# Patient Record
Sex: MALE | Race: WHITE | NOT HISPANIC OR LATINO | Employment: OTHER | URBAN - METROPOLITAN AREA
[De-identification: names, ages, dates, MRNs, and addresses within clinical notes are randomized per-mention and may not be internally consistent; named-entity substitution may affect disease eponyms.]

---

## 2017-10-24 DIAGNOSIS — Z01.812 PRE-OPERATIVE LABORATORY EXAMINATION: ICD-10-CM

## 2017-10-24 DIAGNOSIS — Z01.810 PRE-OPERATIVE CARDIOVASCULAR EXAMINATION: ICD-10-CM

## 2017-10-24 LAB
ALBUMIN SERPL BCP-MCNC: 4.2 G/DL (ref 3.2–4.9)
ALBUMIN/GLOB SERPL: 1.6 G/DL
ALP SERPL-CCNC: 106 U/L (ref 30–99)
ALT SERPL-CCNC: 21 U/L (ref 2–50)
ANION GAP SERPL CALC-SCNC: 4 MMOL/L (ref 0–11.9)
AST SERPL-CCNC: 17 U/L (ref 12–45)
BASOPHILS # BLD AUTO: 1.3 % (ref 0–1.8)
BASOPHILS # BLD: 0.07 K/UL (ref 0–0.12)
BILIRUB SERPL-MCNC: 0.8 MG/DL (ref 0.1–1.5)
BUN SERPL-MCNC: 20 MG/DL (ref 8–22)
CALCIUM SERPL-MCNC: 9.6 MG/DL (ref 8.5–10.5)
CHLORIDE SERPL-SCNC: 106 MMOL/L (ref 96–112)
CO2 SERPL-SCNC: 29 MMOL/L (ref 20–33)
CREAT SERPL-MCNC: 0.8 MG/DL (ref 0.5–1.4)
EKG IMPRESSION: NORMAL
EOSINOPHIL # BLD AUTO: 0.12 K/UL (ref 0–0.51)
EOSINOPHIL NFR BLD: 2.3 % (ref 0–6.9)
ERYTHROCYTE [DISTWIDTH] IN BLOOD BY AUTOMATED COUNT: 44.4 FL (ref 35.9–50)
GFR SERPL CREATININE-BSD FRML MDRD: >60 ML/MIN/1.73 M 2
GLOBULIN SER CALC-MCNC: 2.7 G/DL (ref 1.9–3.5)
GLUCOSE SERPL-MCNC: 86 MG/DL (ref 65–99)
HCT VFR BLD AUTO: 43.4 % (ref 42–52)
HGB BLD-MCNC: 14.7 G/DL (ref 14–18)
IMM GRANULOCYTES # BLD AUTO: 0.03 K/UL (ref 0–0.11)
IMM GRANULOCYTES NFR BLD AUTO: 0.6 % (ref 0–0.9)
INR PPP: 1 (ref 0.87–1.13)
LYMPHOCYTES # BLD AUTO: 1.24 K/UL (ref 1–4.8)
LYMPHOCYTES NFR BLD: 23.3 % (ref 22–41)
MCH RBC QN AUTO: 33.6 PG (ref 27–33)
MCHC RBC AUTO-ENTMCNC: 33.9 G/DL (ref 33.7–35.3)
MCV RBC AUTO: 99.3 FL (ref 81.4–97.8)
MONOCYTES # BLD AUTO: 0.41 K/UL (ref 0–0.85)
MONOCYTES NFR BLD AUTO: 7.7 % (ref 0–13.4)
NEUTROPHILS # BLD AUTO: 3.46 K/UL (ref 1.82–7.42)
NEUTROPHILS NFR BLD: 64.8 % (ref 44–72)
NRBC # BLD AUTO: 0 K/UL
NRBC BLD AUTO-RTO: 0 /100 WBC
PLATELET # BLD AUTO: 255 K/UL (ref 164–446)
PMV BLD AUTO: 9.7 FL (ref 9–12.9)
POTASSIUM SERPL-SCNC: 4.2 MMOL/L (ref 3.6–5.5)
PROT SERPL-MCNC: 6.9 G/DL (ref 6–8.2)
PROTHROMBIN TIME: 13.5 SEC (ref 12–14.6)
RBC # BLD AUTO: 4.37 M/UL (ref 4.7–6.1)
SODIUM SERPL-SCNC: 139 MMOL/L (ref 135–145)
WBC # BLD AUTO: 5.3 K/UL (ref 4.8–10.8)

## 2017-10-24 PROCEDURE — 85610 PROTHROMBIN TIME: CPT

## 2017-10-24 PROCEDURE — 93005 ELECTROCARDIOGRAM TRACING: CPT

## 2017-10-24 PROCEDURE — 80053 COMPREHEN METABOLIC PANEL: CPT

## 2017-10-24 PROCEDURE — 85025 COMPLETE CBC W/AUTO DIFF WBC: CPT

## 2017-10-24 PROCEDURE — 36415 COLL VENOUS BLD VENIPUNCTURE: CPT

## 2017-10-24 PROCEDURE — 93010 ELECTROCARDIOGRAM REPORT: CPT | Performed by: INTERNAL MEDICINE

## 2017-10-27 ENCOUNTER — HOSPITAL ENCOUNTER (OUTPATIENT)
Facility: MEDICAL CENTER | Age: 67
End: 2017-10-27
Attending: SURGERY | Admitting: SURGERY
Payer: MEDICARE

## 2017-10-27 ENCOUNTER — APPOINTMENT (OUTPATIENT)
Dept: RADIOLOGY | Facility: MEDICAL CENTER | Age: 67
End: 2017-10-27
Attending: SURGERY
Payer: MEDICARE

## 2017-10-27 VITALS
HEIGHT: 68 IN | BODY MASS INDEX: 26.96 KG/M2 | WEIGHT: 177.91 LBS | RESPIRATION RATE: 16 BRPM | SYSTOLIC BLOOD PRESSURE: 151 MMHG | TEMPERATURE: 96.6 F | OXYGEN SATURATION: 98 % | DIASTOLIC BLOOD PRESSURE: 100 MMHG | HEART RATE: 86 BPM

## 2017-10-27 PROBLEM — C43.59 MALIGNANT MELANOMA OF SKIN OF TRUNK, EXCEPT SCROTUM (HCC): Status: ACTIVE | Noted: 2017-10-27

## 2017-10-27 PROCEDURE — 160009 HCHG ANES TIME/MIN: Performed by: SURGERY

## 2017-10-27 PROCEDURE — 160036 HCHG PACU - EA ADDL 30 MINS PHASE I: Performed by: SURGERY

## 2017-10-27 PROCEDURE — 160025 RECOVERY II MINUTES (STATS): Performed by: SURGERY

## 2017-10-27 PROCEDURE — 160029 HCHG SURGERY MINUTES - 1ST 30 MINS LEVEL 4: Performed by: SURGERY

## 2017-10-27 PROCEDURE — 501497 HCHG SURGICLIP: Performed by: SURGERY

## 2017-10-27 PROCEDURE — 501838 HCHG SUTURE GENERAL: Performed by: SURGERY

## 2017-10-27 PROCEDURE — 160041 HCHG SURGERY MINUTES - EA ADDL 1 MIN LEVEL 4: Performed by: SURGERY

## 2017-10-27 PROCEDURE — 700111 HCHG RX REV CODE 636 W/ 250 OVERRIDE (IP): Performed by: SURGERY

## 2017-10-27 PROCEDURE — 700111 HCHG RX REV CODE 636 W/ 250 OVERRIDE (IP)

## 2017-10-27 PROCEDURE — 700101 HCHG RX REV CODE 250

## 2017-10-27 PROCEDURE — 88305 TISSUE EXAM BY PATHOLOGIST: CPT

## 2017-10-27 PROCEDURE — 160048 HCHG OR STATISTICAL LEVEL 1-5: Performed by: SURGERY

## 2017-10-27 PROCEDURE — 160035 HCHG PACU - 1ST 60 MINS PHASE I: Performed by: SURGERY

## 2017-10-27 PROCEDURE — 88342 IMHCHEM/IMCYTCHM 1ST ANTB: CPT

## 2017-10-27 PROCEDURE — 160047 HCHG PACU  - EA ADDL 30 MINS PHASE II: Performed by: SURGERY

## 2017-10-27 PROCEDURE — 160002 HCHG RECOVERY MINUTES (STAT): Performed by: SURGERY

## 2017-10-27 PROCEDURE — 160046 HCHG PACU - 1ST 60 MINS PHASE II: Performed by: SURGERY

## 2017-10-27 PROCEDURE — A6402 STERILE GAUZE <= 16 SQ IN: HCPCS | Performed by: SURGERY

## 2017-10-27 RX ORDER — ONDANSETRON 2 MG/ML
4 INJECTION INTRAMUSCULAR; INTRAVENOUS EVERY 4 HOURS PRN
Status: DISCONTINUED | OUTPATIENT
Start: 2017-10-27 | End: 2017-10-27 | Stop reason: HOSPADM

## 2017-10-27 RX ORDER — SODIUM CHLORIDE, SODIUM LACTATE, POTASSIUM CHLORIDE, CALCIUM CHLORIDE 600; 310; 30; 20 MG/100ML; MG/100ML; MG/100ML; MG/100ML
INJECTION, SOLUTION INTRAVENOUS CONTINUOUS
Status: DISCONTINUED | OUTPATIENT
Start: 2017-10-27 | End: 2017-10-27 | Stop reason: HOSPADM

## 2017-10-27 RX ORDER — LIDOCAINE AND PRILOCAINE 25; 25 MG/G; MG/G
1 CREAM TOPICAL
Status: DISCONTINUED | OUTPATIENT
Start: 2017-10-27 | End: 2017-10-27 | Stop reason: HOSPADM

## 2017-10-27 RX ORDER — BUPIVACAINE HYDROCHLORIDE AND EPINEPHRINE 5; 5 MG/ML; UG/ML
INJECTION, SOLUTION EPIDURAL; INTRACAUDAL; PERINEURAL
Status: DISCONTINUED | OUTPATIENT
Start: 2017-10-27 | End: 2017-10-27 | Stop reason: HOSPADM

## 2017-10-27 RX ORDER — LIDOCAINE HYDROCHLORIDE 10 MG/ML
0.5 INJECTION, SOLUTION INFILTRATION; PERINEURAL
Status: DISCONTINUED | OUTPATIENT
Start: 2017-10-27 | End: 2017-10-27 | Stop reason: HOSPADM

## 2017-10-27 RX ORDER — HYDROCODONE BITARTRATE AND ACETAMINOPHEN 5; 325 MG/1; MG/1
1-2 TABLET ORAL EVERY 4 HOURS PRN
Qty: 12 TAB | Refills: 0 | Status: SHIPPED | OUTPATIENT
Start: 2017-10-27 | End: 2018-01-02

## 2017-10-27 ASSESSMENT — PAIN SCALES - GENERAL
PAINLEVEL_OUTOF10: 0

## 2017-10-27 NOTE — DISCHARGE INSTRUCTIONS
ACTIVITY: Rest and take it easy for the first 24 hours.  A responsible adult is recommended to remain with you during that time.  It is normal to feel sleepy.  We encourage you to not do anything that requires balance, judgment or coordination.    MILD FLU-LIKE SYMPTOMS ARE NORMAL. YOU MAY EXPERIENCE GENERALIZED MUSCLE ACHES, THROAT IRRITATION, HEADACHE AND/OR SOME NAUSEA.    FOR 24 HOURS DO NOT:  Drive, operate machinery or run household appliances.  Drink beer or alcoholic beverages.   Make important decisions or sign legal documents.    SPECIAL INSTRUCTIONS:   Remove plastic and gauze in 3 days  Leave underlying steristips on until they fall off  Patient may shower on Post OP Day #2    DIET: To avoid nausea, slowly advance diet as tolerated, avoiding spicy or greasy foods for the first day.  Add more substantial food to your diet according to your physician's instructions.  Babies can be fed formula or breast milk as soon as they are hungry.  INCREASE FLUIDS AND FIBER TO AVOID CONSTIPATION.    SURGICAL DRESSING/BATHING: see above    FOLLOW-UP APPOINTMENT:  A follow-up appointment should be arranged with your doctor in 1-2 weeks; call to schedule.    You should CALL YOUR PHYSICIAN if you develop:  Fever greater than 101 degrees F.  Pain not relieved by medication, or persistent nausea or vomiting.  Excessive bleeding (blood soaking through dressing) or unexpected drainage from the wound.  Extreme redness or swelling around the incision site, drainage of pus or foul smelling drainage.  Inability to urinate or empty your bladder within 8 hours.  Problems with breathing or chest pain.    You should call 911 if you develop problems with breathing or chest pain.  If you are unable to contact your doctor or surgical center, you should go to the nearest emergency room or urgent care center.  Physician's telephone #: Dr. Mcmahan [phone]    If any questions arise, call your doctor.  If your doctor is not available,  please feel free to call the Surgical Center at [phone].  The Center is open Monday through Friday from 7AM to 7PM.  You can also call the HEALTH HOTLINE open 24 hours/day, 7 days/week and speak to a nurse at [phone], or toll free at [phone].    A registered nurse may call you a few days after your surgery to see how you are doing after your procedure.    MEDICATIONS: Resume taking daily medication.  Take prescribed pain medication with food.  If no medication is prescribed, you may take non-aspirin pain medication if needed.  PAIN MEDICATION CAN BE VERY CONSTIPATING.  Take a stool softener or laxative such as senokot, pericolace, or milk of magnesia if needed.    Prescriptions given to wife.  Pain medication may be taken anytime.     If your physician has prescribed pain medication that includes Acetaminophen (Tylenol), do not take additional Acetaminophen (Tylenol) while taking the prescribed medication.    Depression / Suicide Risk    As you are discharged from this [facility] facility, it is important to learn how to keep safe from harming yourself.    Recognize the warning signs:  · Abrupt changes in personality, positive or negative- including increase in energy   · Giving away possessions  · Change in eating patterns- significant weight changes-  positive or negative  · Change in sleeping patterns- unable to sleep or sleeping all the time   · Unwillingness or inability to communicate  · Depression  · Unusual sadness, discouragement and loneliness  · Talk of wanting to die  · Neglect of personal appearance   · Rebelliousness- reckless behavior  · Withdrawal from people/activities they love  · Confusion- inability to concentrate     If you or a loved one observes any of these behaviors or has concerns about self-harm, here's what you can do:  · Talk about it- your feelings and reasons for harming yourself  · Remove any means that you might use to hurt yourself (examples: pills, rope,  extension cords, firearm)  · Get professional help from the community (Mental Health, Substance Abuse, psychological counseling)  · Do not be alone:Call your Safe Contact- someone whom you trust who will be there for you.  · Call your local CRISIS HOTLINE 952-5503 or 746-782-8227  · Call your local Children's Mobile Crisis Response Team [place] (952) 598-5230 or www.[employer]  · Call the toll free National Suicide Prevention Hotlines   · National Suicide Prevention Lifeline 859-486-TFAI (4398)  · National Hope Line Network 800-SUICIDE (732-8585)

## 2017-10-27 NOTE — OR NURSING
D/c orders received. IV dc'd. Pt changed into clothing with assistance. Discharge instructions given; pt and family verbalized understanding and questions answered. Prescriptions with pt. Pt dc'd in w/c with RN; denies nausea and pain.

## 2017-10-27 NOTE — OR SURGEON
Immediate Post OP Note    PreOp Diagnosis: Malignant Melanoma of the Right Upper Back, dysplastic nevus of the right lower back    PostOp Diagnosis: same    Procedure(s):  WIDE EXCISION- RADICAL MALIGNANT MELANOMA RIGHT UPPER BACK - Wound Class: Clean  EXCISION DYSPLASTIC NEVUS LOWER BACK - Wound Class: Clean    Surgeon(s):  Phil Mcmahan M.D.  Assist:  Caesar BAIN    Anesthesiologist/Type of Anesthesia:  Anesthesiologist: Russ Geiger M.D./General    Surgical Staff:  Assistant: Patricia Peralta  Circulator: Kaity Tanner R.MARIJA; Elinor Rose RFifiNFifi  Relief Circulator: Xena Stone RJANAY  Scrub Person: Carla Tripathi    Specimens:  * No specimens in log *    Estimated Blood Loss: minimal    Findings: no gross path but primary lesion was over 3cm in diameter    Complications: no apparent complications        10/27/2017 12:15 PM Phil Mcmahan

## 2017-10-28 NOTE — OP REPORT
DATE OF SERVICE:  10/27/2017    SURGEON:  Phil Mcmahan MD    ASSISTANT:  LOLA Reed as well as Tashia Echeverria, medical student,   third year.    ANESTHESIOLOGIST:  Russ Geiger MD    PREOPERATIVE DIAGNOSES:  1.  Malignant melanoma of the right upper back.  2.  Dysplastic nevus of the right lower back.    PROCEDURE:  1.  Radical wide excision of malignant melanoma of the right upper back with   layered closure.  2.  Excisional biopsy of dysplastic nevus of the right lower back.    FINDINGS:  The patient is a 67-year-old gentleman who has a relatively large   3x3 cm irregular malignant melanoma of the right upper back region.  Two   biopsies were performed which revealed invasive melanoma at 0.8 mm in depth.    A radical wide excision was recommended with the minimum of 1 cm margin.  The   patient elected not to have a sentinel node biopsy performed at this time due   to more shallow nature of the melanoma.  The procedure was performed without   apparent complications.  The dysplastic nevus was also excised in the right   lower back.    FINDINGS AND PROCEDURE:  Patient brought to the operating room and placed on   the table in supine position.  General anesthetic was then provided by Dr. Geiger, the anesthesiologist using laryngeal mask airway.  Patient was then   turned to the left lateral decubitus, right side up position on a beanbag on   the operating room table.  He was carefully secured to the table.  All bony   prominences and superficial nerves were carefully padded.  The arm was   extended on an arm board and an axillary roll was placed.  The right upper   back was then prepped and draped in sterile fashion.  Time out was called.    The correct patient, correct diagnosis, correct surgery, and correct location   of surgery were discussed and agreed upon.  He received preoperative IV   antibiotics.  The patient had an irregular pigmented and hypopigmented lesion   in the right upper back just  medial to the scapula.  A 1 cm margin was marked   around the most lateral aspect of the lesion and then an elliptical incision   was designed to incorporate this 1 cm margin extending superiorly and   inferiorly 5-6 cm in each direction.  The incision was then made in the skin   and dermis with #15 blade.  All bleeding was controlled with electrocautery.    Dissection was then carried down through the subcutaneous tissue until   latissimus dorsi fascia was encountered.  The entire specimen was removed from   the latissimus dorsi fascia and paraspinous muscle fascia.  It was oriented   for the pathologist and sent to pathology for further characterization.  The   remaining tissue around the large defect was now elevated off of the muscular   fascia circumferentially for 3-4 cm in all direction.  This was done in order   to provide enough laxity for the wound to be brought back together.  After the   wound was irrigated and injected with 0.5% Marcaine, the dermis was closed   using interrupted sutures of 3-0 Vicryl suture and the skin was closed using   interrupted sutures of 3-0 nylon suture.    Next, using new instruments, the dysplastic nevus was measured about 1.2x1.2   cm in the right lower back area was evaluated.  An elliptical incision made   around the pigmented lesion with #15 blade incorporating approximately 2-3 mm   margin from normal tissue.  The incision was carried through the skin and   dermis down into the subcutaneous tissue and the entire specimen was removed.    It was oriented for the pathologist.  The surrounding tissue was elevated off   the fascia circumferentially for approximately 1 cm in all directions in   order to provide enough laxity for the wound be back brought together.  The   wound was irrigated and injected with 0.5% Marcaine.  The dermis was closed   using 3-0 Vicryl sutures.  The skin was closed using 3-0 nylon sutures in   interrupted fashion.  Steri-Strips and sterile dressing  were applied.  The   patient did tolerate procedure well without complications.  Final sponge and   needle count were correct.       ____________________________________     MD MARICEL JUSTICE / LATRICE    DD:  10/27/2017 22:43:58  DT:  10/27/2017 23:23:08    D#:  [id number]  Job#:  [id number]    cc: _____ _____

## 2018-01-02 DIAGNOSIS — Z01.812 PRE-OPERATIVE LABORATORY EXAMINATION: ICD-10-CM

## 2018-01-02 LAB
ALBUMIN SERPL BCP-MCNC: 4.2 G/DL (ref 3.2–4.9)
ALBUMIN/GLOB SERPL: 1.8 G/DL
ALP SERPL-CCNC: 90 U/L (ref 30–99)
ALT SERPL-CCNC: 19 U/L (ref 2–50)
ANION GAP SERPL CALC-SCNC: 6 MMOL/L (ref 0–11.9)
AST SERPL-CCNC: 16 U/L (ref 12–45)
BASOPHILS # BLD AUTO: 0.8 % (ref 0–1.8)
BASOPHILS # BLD: 0.05 K/UL (ref 0–0.12)
BILIRUB SERPL-MCNC: 0.5 MG/DL (ref 0.1–1.5)
BUN SERPL-MCNC: 20 MG/DL (ref 8–22)
CALCIUM SERPL-MCNC: 9.2 MG/DL (ref 8.5–10.5)
CHLORIDE SERPL-SCNC: 105 MMOL/L (ref 96–112)
CO2 SERPL-SCNC: 27 MMOL/L (ref 20–33)
CREAT SERPL-MCNC: 0.73 MG/DL (ref 0.5–1.4)
EOSINOPHIL # BLD AUTO: 0.13 K/UL (ref 0–0.51)
EOSINOPHIL NFR BLD: 2.1 % (ref 0–6.9)
ERYTHROCYTE [DISTWIDTH] IN BLOOD BY AUTOMATED COUNT: 44.5 FL (ref 35.9–50)
GFR SERPL CREATININE-BSD FRML MDRD: >60 ML/MIN/1.73 M 2
GLOBULIN SER CALC-MCNC: 2.3 G/DL (ref 1.9–3.5)
GLUCOSE SERPL-MCNC: 94 MG/DL (ref 65–99)
HCT VFR BLD AUTO: 41.5 % (ref 42–52)
HGB BLD-MCNC: 14.6 G/DL (ref 14–18)
IMM GRANULOCYTES # BLD AUTO: 0.02 K/UL (ref 0–0.11)
IMM GRANULOCYTES NFR BLD AUTO: 0.3 % (ref 0–0.9)
LYMPHOCYTES # BLD AUTO: 1.25 K/UL (ref 1–4.8)
LYMPHOCYTES NFR BLD: 20.2 % (ref 22–41)
MCH RBC QN AUTO: 34.4 PG (ref 27–33)
MCHC RBC AUTO-ENTMCNC: 35.2 G/DL (ref 33.7–35.3)
MCV RBC AUTO: 97.6 FL (ref 81.4–97.8)
MONOCYTES # BLD AUTO: 0.46 K/UL (ref 0–0.85)
MONOCYTES NFR BLD AUTO: 7.4 % (ref 0–13.4)
NEUTROPHILS # BLD AUTO: 4.27 K/UL (ref 1.82–7.42)
NEUTROPHILS NFR BLD: 69.2 % (ref 44–72)
NRBC # BLD AUTO: 0 K/UL
NRBC BLD-RTO: 0 /100 WBC
PLATELET # BLD AUTO: 229 K/UL (ref 164–446)
PMV BLD AUTO: 9.3 FL (ref 9–12.9)
POTASSIUM SERPL-SCNC: 3.6 MMOL/L (ref 3.6–5.5)
PROT SERPL-MCNC: 6.5 G/DL (ref 6–8.2)
RBC # BLD AUTO: 4.25 M/UL (ref 4.7–6.1)
SODIUM SERPL-SCNC: 138 MMOL/L (ref 135–145)
WBC # BLD AUTO: 6.2 K/UL (ref 4.8–10.8)

## 2018-01-02 PROCEDURE — 80053 COMPREHEN METABOLIC PANEL: CPT

## 2018-01-02 PROCEDURE — 36415 COLL VENOUS BLD VENIPUNCTURE: CPT

## 2018-01-02 PROCEDURE — 85025 COMPLETE CBC W/AUTO DIFF WBC: CPT

## 2018-01-02 RX ORDER — VALSARTAN 160 MG/1
160 TABLET ORAL DAILY
COMMUNITY
End: 2019-02-12

## 2018-01-11 ENCOUNTER — APPOINTMENT (OUTPATIENT)
Dept: RADIOLOGY | Facility: MEDICAL CENTER | Age: 68
End: 2018-01-11
Attending: SURGERY
Payer: MEDICARE

## 2018-01-11 ENCOUNTER — HOSPITAL ENCOUNTER (OUTPATIENT)
Facility: MEDICAL CENTER | Age: 68
End: 2018-01-11
Attending: SURGERY | Admitting: SURGERY
Payer: MEDICARE

## 2018-01-11 VITALS
HEIGHT: 68 IN | DIASTOLIC BLOOD PRESSURE: 86 MMHG | TEMPERATURE: 97.1 F | HEART RATE: 66 BPM | SYSTOLIC BLOOD PRESSURE: 128 MMHG | RESPIRATION RATE: 20 BRPM | WEIGHT: 175.04 LBS | BODY MASS INDEX: 26.53 KG/M2 | OXYGEN SATURATION: 94 %

## 2018-01-11 DIAGNOSIS — C43.59 MALIGNANT MELANOMA OF SKIN OF TRUNK, EXCEPT SCROTUM (HCC): ICD-10-CM

## 2018-01-11 PROCEDURE — 160029 HCHG SURGERY MINUTES - 1ST 30 MINS LEVEL 4: Performed by: SURGERY

## 2018-01-11 PROCEDURE — 160041 HCHG SURGERY MINUTES - EA ADDL 1 MIN LEVEL 4: Performed by: SURGERY

## 2018-01-11 PROCEDURE — A6402 STERILE GAUZE <= 16 SQ IN: HCPCS | Performed by: SURGERY

## 2018-01-11 PROCEDURE — 88342 IMHCHEM/IMCYTCHM 1ST ANTB: CPT

## 2018-01-11 PROCEDURE — 700101 HCHG RX REV CODE 250

## 2018-01-11 PROCEDURE — 160035 HCHG PACU - 1ST 60 MINS PHASE I: Performed by: SURGERY

## 2018-01-11 PROCEDURE — 160048 HCHG OR STATISTICAL LEVEL 1-5: Performed by: SURGERY

## 2018-01-11 PROCEDURE — 500122 HCHG BOVIE, BLADE: Performed by: SURGERY

## 2018-01-11 PROCEDURE — 88341 IMHCHEM/IMCYTCHM EA ADD ANTB: CPT

## 2018-01-11 PROCEDURE — 700111 HCHG RX REV CODE 636 W/ 250 OVERRIDE (IP)

## 2018-01-11 PROCEDURE — 160009 HCHG ANES TIME/MIN: Performed by: SURGERY

## 2018-01-11 PROCEDURE — 501838 HCHG SUTURE GENERAL: Performed by: SURGERY

## 2018-01-11 PROCEDURE — 160002 HCHG RECOVERY MINUTES (STAT): Performed by: SURGERY

## 2018-01-11 PROCEDURE — 88307 TISSUE EXAM BY PATHOLOGIST: CPT | Mod: 59

## 2018-01-11 PROCEDURE — A9541 TC99M SULFUR COLLOID: HCPCS

## 2018-01-11 PROCEDURE — 501445 HCHG STAPLER, SKIN DISP: Performed by: SURGERY

## 2018-01-11 RX ORDER — BUPIVACAINE HYDROCHLORIDE AND EPINEPHRINE 5; 5 MG/ML; UG/ML
INJECTION, SOLUTION EPIDURAL; INTRACAUDAL; PERINEURAL
Status: DISCONTINUED
Start: 2018-01-11 | End: 2018-01-11 | Stop reason: HOSPADM

## 2018-01-11 RX ORDER — ONDANSETRON 2 MG/ML
4 INJECTION INTRAMUSCULAR; INTRAVENOUS EVERY 4 HOURS PRN
Status: DISCONTINUED | OUTPATIENT
Start: 2018-01-11 | End: 2018-01-11 | Stop reason: HOSPADM

## 2018-01-11 RX ORDER — SODIUM CHLORIDE, SODIUM LACTATE, POTASSIUM CHLORIDE, CALCIUM CHLORIDE 600; 310; 30; 20 MG/100ML; MG/100ML; MG/100ML; MG/100ML
INJECTION, SOLUTION INTRAVENOUS CONTINUOUS
Status: DISCONTINUED | OUTPATIENT
Start: 2018-01-11 | End: 2018-01-11

## 2018-01-11 RX ORDER — BUPIVACAINE HYDROCHLORIDE AND EPINEPHRINE 5; 5 MG/ML; UG/ML
INJECTION, SOLUTION EPIDURAL; INTRACAUDAL; PERINEURAL
Status: DISCONTINUED | OUTPATIENT
Start: 2018-01-11 | End: 2018-01-11 | Stop reason: HOSPADM

## 2018-01-11 RX ORDER — ISOSULFAN BLUE 50 MG/5ML
INJECTION, SOLUTION SUBCUTANEOUS
Status: DISCONTINUED
Start: 2018-01-11 | End: 2018-01-11 | Stop reason: HOSPADM

## 2018-01-11 RX ORDER — SODIUM CHLORIDE, SODIUM LACTATE, POTASSIUM CHLORIDE, CALCIUM CHLORIDE 600; 310; 30; 20 MG/100ML; MG/100ML; MG/100ML; MG/100ML
INJECTION, SOLUTION INTRAVENOUS CONTINUOUS
Status: DISCONTINUED | OUTPATIENT
Start: 2018-01-11 | End: 2018-01-11 | Stop reason: HOSPADM

## 2018-01-11 RX ADMIN — SODIUM CHLORIDE, SODIUM LACTATE, POTASSIUM CHLORIDE, CALCIUM CHLORIDE: 600; 310; 30; 20 INJECTION, SOLUTION INTRAVENOUS at 10:00

## 2018-01-11 ASSESSMENT — PAIN SCALES - GENERAL
PAINLEVEL_OUTOF10: 0
PAINLEVEL_OUTOF10: ASSUMED PAIN PRESENT

## 2018-01-11 NOTE — PROGRESS NOTES
1420 - pt adm to PACU from OR via [facility] acc by RN and Anesthesia - report received and care assumed. Pt arousable to name, VSS - breathing even and unlabored. Pt denies pain or nausea. Dressing liliana axilla region - dsd covered with tegaderm - YURI  1440 - no c/o pain or nausea, radha water  1450 - family at bedside.   1505- d/c instructions reviewed with pt and family - all questions and concerns addressed  1515 - pt d/jihan via w/c to private car acc by CNA and family

## 2018-01-11 NOTE — OR SURGEON
Immediate Post OP Note    PreOp Diagnosis: Malignant Melanoma of Upper Mid Back    PostOp Diagnosis: same    Procedure(s):  Bilateral NODE BIOPSY SENTINEL - AXILLARY - Wound Class: Clean    Surgeon(s):  Phil Mcmahan M.D.    Anesthesiologist/Type of Anesthesia:  Anesthesiologist: Savage Roldan M.D./General    Surgical Staff:  Circulator: Philly Morris R.N.; Jaci Eubanks R.N.  Relief Scrub: Tone Black  Scrub Person: Allison Grossman    Specimens:  * No specimens in log *    Estimated Blood Loss: minimal    Findings: 3 radioactive nodes on the right, 3 on the left    Complications: no apparent complications        1/11/2018 2:18 PM Phil Mcmahan

## 2018-01-11 NOTE — DISCHARGE INSTRUCTIONS
ACTIVITY: Rest and take it easy for the first 24 hours.  A responsible adult is recommended to remain with you during that time.  It is normal to feel sleepy.  We encourage you to not do anything that requires balance, judgment or coordination.    MILD FLU-LIKE SYMPTOMS ARE NORMAL. YOU MAY EXPERIENCE GENERALIZED MUSCLE ACHES, THROAT IRRITATION, HEADACHE AND/OR SOME NAUSEA.    FOR 24 HOURS DO NOT:  Drive, operate machinery or run household appliances.  Drink beer or alcoholic beverages.   Make important decisions or sign legal documents.    SPECIAL INSTRUCTIONS: PER MD'S INSTRUCTIONS    DIET: To avoid nausea, slowly advance diet as tolerated, avoiding spicy or greasy foods for the first day.  Add more substantial food to your diet according to your physician's instructions.  Babies can be fed formula or breast milk as soon as they are hungry.  INCREASE FLUIDS AND FIBER TO AVOID CONSTIPATION.    SURGICAL DRESSING/BATHING: PER MD'S INSTRUCTIONS  Avoid any heavy lifting more than 15 pounds for 4 weeks       FOLLOW-UP APPOINTMENT:  A follow-up appointment should be arranged with your doctor in PER MD; call to schedule.    You should CALL YOUR PHYSICIAN if you develop:  Fever greater than 101 degrees F.  Pain not relieved by medication, or persistent nausea or vomiting.  Excessive bleeding (blood soaking through dressing) or unexpected drainage from the wound.  Extreme redness or swelling around the incision site, drainage of pus or foul smelling drainage.  Inability to urinate or empty your bladder within 8 hours.  Problems with breathing or chest pain.    You should call 911 if you develop problems with breathing or chest pain.  If you are unable to contact your doctor or surgical center, you should go to the nearest emergency room or urgent care center.  Physician's telephone #: [phone]    If any questions arise, call your doctor.  If your doctor is not available, please feel free to call the Surgical Center at  [phone].  The Center is open Monday through Friday from 7AM to 7PM.  You can also call the HEALTH HOTLINE open 24 hours/day, 7 days/week and speak to a nurse at [phone], or toll free at [phone].    A registered nurse may call you a few days after your surgery to see how you are doing after your procedure.    MEDICATIONS: Resume taking daily medication.  Take prescribed pain medication with food.  If no medication is prescribed, you may take non-aspirin pain medication if needed.  PAIN MEDICATION CAN BE VERY CONSTIPATING.  Take a stool softener or laxative such as senokot, pericolace, or milk of magnesia if needed.    Prescription given for PT HAS PRESCRIPTION AT HOME.  Last pain medication given at NA.    If your physician has prescribed pain medication that includes Acetaminophen (Tylenol), do not take additional Acetaminophen (Tylenol) while taking the prescribed medication.    Depression / Suicide Risk    As you are discharged from this [facility] facility, it is important to learn how to keep safe from harming yourself.    Recognize the warning signs:  · Abrupt changes in personality, positive or negative- including increase in energy   · Giving away possessions  · Change in eating patterns- significant weight changes-  positive or negative  · Change in sleeping patterns- unable to sleep or sleeping all the time   · Unwillingness or inability to communicate  · Depression  · Unusual sadness, discouragement and loneliness  · Talk of wanting to die  · Neglect of personal appearance   · Rebelliousness- reckless behavior  · Withdrawal from people/activities they love  · Confusion- inability to concentrate     If you or a loved one observes any of these behaviors or has concerns about self-harm, here's what you can do:  · Talk about it- your feelings and reasons for harming yourself  · Remove any means that you might use to hurt yourself (examples: pills, rope, extension cords, firearm)  · Get  professional help from the community (Mental Health, Substance Abuse, psychological counseling)  · Do not be alone:Call your Safe Contact- someone whom you trust who will be there for you.  · Call your local CRISIS HOTLINE 644-4891 or 535-350-9394  · Call your local Children's Mobile Crisis Response Team [place] (059) 457-9717 or www.[employer]  · Call the toll free National Suicide Prevention Hotlines   · National Suicide Prevention Lifeline 066-606-XKJC (6355)  · National Hope Line Network 800-SUICIDE (951-2326)

## 2018-01-11 NOTE — OR NURSING
0945- Pt to nuc. [facility] for [facility] with transport.  1130- pt back from Nuc med and up to restroom, back to room and family at the bedside. Pt notified of delay, no needs at this time.

## 2018-01-12 NOTE — OP REPORT
DATE OF SERVICE:  01/11/2018    SURGEON PRESENT:  Phil Mcmahan MD    ANESTHESIOLOGIST:  Savage Roldan MD    TYPE OF ANESTHETIC:  General anesthetic using a laryngeal mask airway plus   local 0.5% Marcaine with epinephrine.    PREOPERATIVE DIAGNOSIS:  Malignant melanoma of the upper mid back region.    POSTOPERATIVE DIAGNOSIS:  Malignant melanoma of the upper mid back region.    PROCEDURE:  Right and left axillary sentinel node biopsy.    FINDINGS:  The patient is a 67-year-old gentleman, who recently found to have   a malignant melanoma of the mid upper back region.  This was resected with a   radical wide excision, and the final depth was found to be deeper than   expected, and therefore, a sentinel node was recommended, so he is to have   injection for sentinel node today, and the reactivity went to both the right   and left axilla.  Therefore, he was scheduled for bilateral axillary node   biopsy.  This was performed, approximately 3 nodes were identified on the   right and 2 on the left, and none were particularly suspicious.  There were no   apparent complications.    FINDINGS AND PROCEDURE:  Patient was brought to the operating room and placed   on the table in supine position.  General anesthetic was provided by Dr. Roldan,   the anesthesiologist.  The right and left axillary areas were then prepped   and draped in usual sterile fashion.  A time-out was called.  The correct   patient, correct diagnosis, correct surgery, and correct location of the   surgery were discussed and agreed upon.  The right axilla was addressed first.    A curvilinear incision was made just below the hair bearing portion of the   right axilla with #15 blade.  All bleeding was controlled with electrocautery.    Dissection was then carried down through the subcutaneous tissue and   clavipectoral fascia.  Using a navigator probe, 3 radioactive lymph nodes were   identified fairly deep in the posterior axilla.  These were dissected out    with the LigaSure device.  Once the nodes were removed, there were no areas in   the axilla that were hotter than 10% of the hottest node.  The wound was   irrigated and then closed with a running 3-0 Vicryl suture for the   clavipectoral fascia and dermis and running 4-0 Monocryl suture for the skin.    Steri-Strips and sterile dressing were applied.    Next, left axilla was addressed.  A curvilinear incision was made just below   the hair bearing portion of the left axilla with #15 blade.  All bleeding was   controlled with electrocautery.  Dissection was then carried down through the   subcutaneous tissue and then through the clavipectoral fascia.  A navigator   probe was then used to identify 1-2 cm nodes in the mid posterior axilla on   the left side.  These were dissected out with the LigaSure device.  They were   sent to pathology for further characterization.  Further interrogation of the   axilla did not reveal any areas hotter than 10% of the hottest node.    Therefore, the wound was irrigated and then closed with a running 3-0 Vicryl   suture for the clavipectoral fascia and for the dermis and the running 4-0   Monocryl suture for the skin.  Steri-Strips and sterile dressing were applied.    The patient did tolerate the procedure well with no complications.  Final   sponge and needle counts were correct.  He was then taken back to recovery   room for further postoperative care.       ____________________________________     MD MARICEL JUSTICE / LATRICE    DD:  01/11/2018 22:31:22  DT:  01/11/2018 23:15:13    D#:  [id number]  Job#:  [id number]    cc: Blake Zamudio MD, XAVIER GOMEZ MD

## 2018-03-27 ENCOUNTER — HOSPITAL ENCOUNTER (OUTPATIENT)
Dept: LAB | Facility: MEDICAL CENTER | Age: 68
End: 2018-03-27
Attending: FAMILY MEDICINE
Payer: MEDICARE

## 2018-03-27 LAB
ALBUMIN SERPL BCP-MCNC: 4.4 G/DL (ref 3.2–4.9)
ALBUMIN/GLOB SERPL: 1.6 G/DL
ALP SERPL-CCNC: 103 U/L (ref 30–99)
ALT SERPL-CCNC: 23 U/L (ref 2–50)
ANION GAP SERPL CALC-SCNC: 4 MMOL/L (ref 0–11.9)
APPEARANCE UR: CLEAR
AST SERPL-CCNC: 18 U/L (ref 12–45)
BASOPHILS # BLD AUTO: 1.1 % (ref 0–1.8)
BASOPHILS # BLD: 0.07 K/UL (ref 0–0.12)
BILIRUB SERPL-MCNC: 0.8 MG/DL (ref 0.1–1.5)
BILIRUB UR QL STRIP.AUTO: NEGATIVE
BUN SERPL-MCNC: 22 MG/DL (ref 8–22)
CALCIUM SERPL-MCNC: 9.3 MG/DL (ref 8.5–10.5)
CHLORIDE SERPL-SCNC: 106 MMOL/L (ref 96–112)
CHOLEST SERPL-MCNC: 163 MG/DL (ref 100–199)
CO2 SERPL-SCNC: 29 MMOL/L (ref 20–33)
COLOR UR: YELLOW
CREAT SERPL-MCNC: 0.76 MG/DL (ref 0.5–1.4)
EOSINOPHIL # BLD AUTO: 0.14 K/UL (ref 0–0.51)
EOSINOPHIL NFR BLD: 2.2 % (ref 0–6.9)
ERYTHROCYTE [DISTWIDTH] IN BLOOD BY AUTOMATED COUNT: 46.1 FL (ref 35.9–50)
EST. AVERAGE GLUCOSE BLD GHB EST-MCNC: 100 MG/DL
GLOBULIN SER CALC-MCNC: 2.8 G/DL (ref 1.9–3.5)
GLUCOSE SERPL-MCNC: 101 MG/DL (ref 65–99)
GLUCOSE UR STRIP.AUTO-MCNC: NEGATIVE MG/DL
HBA1C MFR BLD: 5.1 % (ref 0–5.6)
HCT VFR BLD AUTO: 43.4 % (ref 42–52)
HDLC SERPL-MCNC: 56 MG/DL
HGB BLD-MCNC: 14.9 G/DL (ref 14–18)
IMM GRANULOCYTES # BLD AUTO: 0.02 K/UL (ref 0–0.11)
IMM GRANULOCYTES NFR BLD AUTO: 0.3 % (ref 0–0.9)
KETONES UR STRIP.AUTO-MCNC: NEGATIVE MG/DL
LDLC SERPL CALC-MCNC: 91 MG/DL
LEUKOCYTE ESTERASE UR QL STRIP.AUTO: NEGATIVE
LYMPHOCYTES # BLD AUTO: 1.24 K/UL (ref 1–4.8)
LYMPHOCYTES NFR BLD: 19.9 % (ref 22–41)
MCH RBC QN AUTO: 34.3 PG (ref 27–33)
MCHC RBC AUTO-ENTMCNC: 34.3 G/DL (ref 33.7–35.3)
MCV RBC AUTO: 100 FL (ref 81.4–97.8)
MICRO URNS: NORMAL
MONOCYTES # BLD AUTO: 0.52 K/UL (ref 0–0.85)
MONOCYTES NFR BLD AUTO: 8.3 % (ref 0–13.4)
NEUTROPHILS # BLD AUTO: 4.25 K/UL (ref 1.82–7.42)
NEUTROPHILS NFR BLD: 68.2 % (ref 44–72)
NITRITE UR QL STRIP.AUTO: NEGATIVE
NRBC # BLD AUTO: 0 K/UL
NRBC BLD-RTO: 0 /100 WBC
PH UR STRIP.AUTO: 5.5 [PH]
PLATELET # BLD AUTO: 233 K/UL (ref 164–446)
PMV BLD AUTO: 9.5 FL (ref 9–12.9)
POTASSIUM SERPL-SCNC: 4.2 MMOL/L (ref 3.6–5.5)
PROT SERPL-MCNC: 7.2 G/DL (ref 6–8.2)
PROT UR QL STRIP: NEGATIVE MG/DL
PSA SERPL-MCNC: 9.31 NG/ML (ref 0–4)
RBC # BLD AUTO: 4.34 M/UL (ref 4.7–6.1)
RBC UR QL AUTO: NEGATIVE
SODIUM SERPL-SCNC: 139 MMOL/L (ref 135–145)
SP GR UR STRIP.AUTO: 1.03
TRIGL SERPL-MCNC: 82 MG/DL (ref 0–149)
TSH SERPL DL<=0.005 MIU/L-ACNC: 1.95 UIU/ML (ref 0.38–5.33)
UROBILINOGEN UR STRIP.AUTO-MCNC: 0.2 MG/DL
WBC # BLD AUTO: 6.2 K/UL (ref 4.8–10.8)

## 2018-03-27 PROCEDURE — 85025 COMPLETE CBC W/AUTO DIFF WBC: CPT

## 2018-03-27 PROCEDURE — 80053 COMPREHEN METABOLIC PANEL: CPT

## 2018-03-27 PROCEDURE — 36415 COLL VENOUS BLD VENIPUNCTURE: CPT

## 2018-03-27 PROCEDURE — 84443 ASSAY THYROID STIM HORMONE: CPT

## 2018-03-27 PROCEDURE — 81003 URINALYSIS AUTO W/O SCOPE: CPT

## 2018-03-27 PROCEDURE — 83036 HEMOGLOBIN GLYCOSYLATED A1C: CPT

## 2018-03-27 PROCEDURE — 80061 LIPID PANEL: CPT

## 2018-03-27 PROCEDURE — 84153 ASSAY OF PSA TOTAL: CPT

## 2018-04-16 ENCOUNTER — APPOINTMENT (RX ONLY)
Dept: URBAN - METROPOLITAN AREA CLINIC 4 | Facility: CLINIC | Age: 68
Setting detail: DERMATOLOGY
End: 2018-04-16

## 2018-04-16 DIAGNOSIS — D22 MELANOCYTIC NEVI: ICD-10-CM

## 2018-04-16 DIAGNOSIS — L82.1 OTHER SEBORRHEIC KERATOSIS: ICD-10-CM

## 2018-04-16 DIAGNOSIS — D18.0 HEMANGIOMA: ICD-10-CM

## 2018-04-16 DIAGNOSIS — L81.4 OTHER MELANIN HYPERPIGMENTATION: ICD-10-CM

## 2018-04-16 DIAGNOSIS — Z85.828 PERSONAL HISTORY OF OTHER MALIGNANT NEOPLASM OF SKIN: ICD-10-CM

## 2018-04-16 DIAGNOSIS — Z85.820 PERSONAL HISTORY OF MALIGNANT MELANOMA OF SKIN: ICD-10-CM

## 2018-04-16 PROBLEM — D22.5 MELANOCYTIC NEVI OF TRUNK: Status: ACTIVE | Noted: 2018-04-16

## 2018-04-16 PROBLEM — I10 ESSENTIAL (PRIMARY) HYPERTENSION: Status: ACTIVE | Noted: 2018-04-16

## 2018-04-16 PROBLEM — D18.01 HEMANGIOMA OF SKIN AND SUBCUTANEOUS TISSUE: Status: ACTIVE | Noted: 2018-04-16

## 2018-04-16 PROBLEM — D48.5 NEOPLASM OF UNCERTAIN BEHAVIOR OF SKIN: Status: ACTIVE | Noted: 2018-04-16

## 2018-04-16 PROBLEM — L57.0 ACTINIC KERATOSIS: Status: ACTIVE | Noted: 2018-04-16

## 2018-04-16 PROCEDURE — 99202 OFFICE O/P NEW SF 15 MIN: CPT | Mod: 25

## 2018-04-16 PROCEDURE — ? COUNSELING

## 2018-04-16 PROCEDURE — 11100: CPT

## 2018-04-16 PROCEDURE — 11101: CPT

## 2018-04-16 PROCEDURE — ? BIOPSY BY SHAVE METHOD

## 2018-04-16 ASSESSMENT — LOCATION DETAILED DESCRIPTION DERM
LOCATION DETAILED: INFERIOR THORACIC SPINE
LOCATION DETAILED: LEFT MID-UPPER BACK
LOCATION DETAILED: LEFT INFERIOR MEDIAL UPPER BACK
LOCATION DETAILED: RIGHT SUPERIOR MEDIAL MIDBACK
LOCATION DETAILED: RIGHT SUPERIOR MEDIAL UPPER BACK
LOCATION DETAILED: LEFT INFERIOR UPPER BACK
LOCATION DETAILED: RIGHT POSTERIOR SHOULDER

## 2018-04-16 ASSESSMENT — LOCATION SIMPLE DESCRIPTION DERM
LOCATION SIMPLE: RIGHT UPPER BACK
LOCATION SIMPLE: LEFT UPPER BACK
LOCATION SIMPLE: RIGHT LOWER BACK
LOCATION SIMPLE: RIGHT SHOULDER
LOCATION SIMPLE: UPPER BACK

## 2018-04-16 ASSESSMENT — LOCATION ZONE DERM
LOCATION ZONE: TRUNK
LOCATION ZONE: ARM

## 2018-04-16 NOTE — PROCEDURE: BIOPSY BY SHAVE METHOD
Hemostasis: Drysol and Electrocautery
Size Of Lesion In Cm: 0
Detail Level: Detailed
Electrodesiccation And Curettage Text: The wound bed was treated with electrodesiccation and curettage after the biopsy was performed.
Lab: [id number]
Biopsy Method: Personna blade
Render Post-Care Instructions In Note?: yes
Wound Care: Vaseline
Consent: Written consent was obtained and risks were reviewed including but not limited to scarring, infection, bleeding, scabbing, incomplete removal, nerve damage and allergy to anesthesia.
Anesthesia Volume In Cc: 0.5
Notification Instructions: Patient will be notified of biopsy results. However, patient instructed to call the office if not contacted within 2 weeks.
Type Of Destruction Used: Curettage
Billing Type: Third-Party Bill
Biopsy Type: H and E
Bill 15503 For Specimen Handling/Conveyance To Laboratory?: no
Curettage Text: The wound bed was treated with curettage after the biopsy was performed.
Path Notes (To The Dermatopathologist): Mohs vs imiquimod if +
Electrodesiccation Text: The wound bed was treated with electrodesiccation after the biopsy was performed.
Post-Care Instructions: I reviewed with the patient in detail post-care instructions. Patient is to keep the biopsy site dry overnight, and then apply vasaline twice daily until healed.
Anesthesia Type: 1% lidocaine with epinephrine and a 1:10 solution of 8.4% sodium bicarbonate
Lab Facility: 
Dressing: Band-Aid
Path Notes (To The Dermatopathologist): Exc if +

## 2018-04-16 NOTE — PROCEDURE: COUNSELING
Quality 137: Melanoma: Continuity Of Care - Recall System: Patient information entered into a recall system that includes: target date for the next exam specified AND a process to follow up with patients regarding missed or unscheduled appointments
Quality 224: Stage 0-Iic Melanoma: Overutilization Of Imaging Studies For Only Stage 0-Iic Melanoma: None of the following diagnostic imaging studies ordered: chest X-ray, CT, Ultrasound, MRI, PET, or nuclear medicine scans (ML)
Detail Level: Detailed
Detail Level: Generalized
Detail Level: Zone

## 2018-04-16 NOTE — HPI: MELANOMA F/U (HISTORY OF MALIGNANT MELANOMA)
What Is The Reason For Today's Visit?: History of Melanoma
Additional History: Pt stated he will sign a release form to try and get his records faxed over from Dr. Santacruz regarding the MMs.
Year Excised?: 10/2017

## 2018-05-01 ENCOUNTER — APPOINTMENT (RX ONLY)
Dept: URBAN - METROPOLITAN AREA CLINIC 4 | Facility: CLINIC | Age: 68
Setting detail: DERMATOLOGY
End: 2018-05-01

## 2018-05-01 PROBLEM — C44.311 BASAL CELL CARCINOMA OF SKIN OF NOSE: Status: ACTIVE | Noted: 2018-05-01

## 2018-05-01 PROCEDURE — ? PRESCRIPTION

## 2018-05-01 PROCEDURE — 99212 OFFICE O/P EST SF 10 MIN: CPT

## 2018-05-01 PROCEDURE — ? MEDICATION COUNSELING

## 2018-05-01 RX ORDER — IMIQUIMOD 50 MG/G
CREAM TOPICAL
Qty: 6 | Refills: 1 | Status: CANCELLED
Stop reason: CLARIF

## 2018-05-01 NOTE — PROCEDURE: MEDICATION COUNSELING
Arava Pregnancy And Lactation Text: This medication is Pregnancy Category X and is absolutely contraindicated during pregnancy. It is unknown if it is excreted in breast milk.
Rituxan Pregnancy And Lactation Text: This medication is Pregnancy Category C and it isn't know if it is safe during pregnancy. It is unknown if this medication is excreted in breast milk but similar antibodies are known to be excreted.
Itraconazole Pregnancy And Lactation Text: This medication is Pregnancy Category C and it isn't know if it is safe during pregnancy. It is also excreted in breast milk.
Hydroxychloroquine Counseling:  I discussed with the patient that a baseline ophthalmologic exam is needed at the start of therapy and every year thereafter while on therapy. A CBC may also be warranted for monitoring.  The side effects of this medication were discussed with the patient, including but not limited to agranulocytosis, aplastic anemia, seizures, rashes, retinopathy, and liver toxicity. Patient instructed to call the office should any adverse effect occur.  The patient verbalized understanding of the proper use and possible adverse effects of Plaquenil.  All the patient's questions and concerns were addressed.
Xeljanz Counseling: I discussed with the patient the risks of Xeljanz therapy including increased risk of infection, liver issues, headache, diarrhea, or cold symptoms. Live vaccines should be avoided. They were instructed to call if they have any problems.
Solaraze Pregnancy And Lactation Text: This medication is Pregnancy Category B and is considered safe. There is some data to suggest avoiding during the third trimester. It is unknown if this medication is excreted in breast milk.
Birth Control Pills Pregnancy And Lactation Text: This medication should be avoided if pregnant and for the first 30 days post-partum.
Detail Level: Detailed
Arava Counseling:  Patient counseled regarding adverse effects of Arava including but not limited to nausea, vomiting, abnormalities in liver function tests. Patients may develop mouth sores, rash, diarrhea, and abnormalities in blood counts. The patient understands that monitoring is required including LFTs and blood counts.  There is a rare possibility of scarring of the liver and lung problems that can occur when taking methotrexate. Persistent nausea, loss of appetite, pale stools, dark urine, cough, and shortness of breath should be reported immediately. Patient advised to discontinue Arava treatment and consult with a physician prior to attempting conception. The patient will have to undergo a treatment to eliminate Arava from the body prior to conception.
Cyclosporine Counseling:  I discussed with the patient the risks of cyclosporine including but not limited to hypertension, gingival hyperplasia,myelosuppression, immunosuppression, liver damage, kidney damage, neurotoxicity, lymphoma, and serious infections. The patient understands that monitoring is required including baseline blood pressure, CBC, CMP, lipid panel and uric acid, and then 1-2 times monthly CMP and blood pressure.
Xolair Counseling:  Patient informed of potential adverse effects including but not limited to fever, muscle aches, rash and allergic reactions.  The patient verbalized understanding of the proper use and possible adverse effects of Xolair.  All of the patient's questions and concerns were addressed.
Azithromycin Counseling:  I discussed with the patient the risks of azithromycin including but not limited to GI upset, allergic reaction, drug rash, diarrhea, and yeast infections.
Picato Pregnancy And Lactation Text: This medication is Pregnancy Category C. It is unknown if this medication is excreted in breast milk.
Terbinafine Pregnancy And Lactation Text: This medication is Pregnancy Category B and is considered safe during pregnancy. It is also excreted in breast milk and breast feeding isn't recommended.
Rifampin Pregnancy And Lactation Text: This medication is Pregnancy Category C and it isn't know if it is safe during pregnancy. It is also excreted in breast milk and should not be used if you are breast feeding.
Drysol Counseling:  I discussed with the patient the risks of drysol/aluminum chloride including but not limited to skin rash, itching, irritation, burning.
Minocycline Pregnancy And Lactation Text: This medication is Pregnancy Category D and not consider safe during pregnancy. It is also excreted in breast milk.
Albendazole Pregnancy And Lactation Text: This medication is Pregnancy Category C and it isn't known if it is safe during pregnancy. It is also excreted in breast milk.
Ivermectin Counseling:  Patient instructed to take medication on an empty stomach with a full glass of water.  Patient informed of potential adverse effects including but not limited to nausea, diarrhea, dizziness, itching, and swelling of the extremities or lymph nodes.  The patient verbalized understanding of the proper use and possible adverse effects of ivermectin.  All of the patient's questions and concerns were addressed.
Acitretin Pregnancy And Lactation Text: This medication is Pregnancy Category X and should not be given to women who are pregnant or may become pregnant in the future. This medication is excreted in breast milk.
Minocycline Counseling: Patient advised regarding possible photosensitivity and discoloration of the teeth, skin, lips, tongue and gums.  Patient instructed to avoid sunlight, if possible.  When exposed to sunlight, patients should wear protective clothing, sunglasses, and sunscreen.  The patient was instructed to call the office immediately if the following severe adverse effects occur:  hearing changes, easy bruising/bleeding, severe headache, or vision changes.  The patient verbalized understanding of the proper use and possible adverse effects of minocycline.  All of the patient's questions and concerns were addressed.
Oxybutynin Pregnancy And Lactation Text: This medication is Pregnancy Category B and is considered safe during pregnancy. It is unknown if it is excreted in breast milk.
Gabapentin Counseling: I discussed with the patient the risks of gabapentin including but not limited to dizziness, somnolence, fatigue and ataxia.
Benzoyl Peroxide Pregnancy And Lactation Text: This medication is Pregnancy Category C. It is unknown if benzoyl peroxide is excreted in breast milk.
Humira Pregnancy And Lactation Text: This medication is Pregnancy Category B and is considered safe during pregnancy. It is unknown if this medication is excreted in breast milk.
Tazorac Counseling:  Patient advised that medication is irritating and drying.  Patient may need to apply sparingly and wash off after an hour before eventually leaving it on overnight.  The patient verbalized understanding of the proper use and possible adverse effects of tazorac.  All of the patient's questions and concerns were addressed.
Clofazimine Pregnancy And Lactation Text: This medication is Pregnancy Category C and isn't considered safe during pregnancy. It is excreted in breast milk.
Ketoconazole Pregnancy And Lactation Text: This medication is Pregnancy Category C and it isn't know if it is safe during pregnancy. It is also excreted in breast milk and breast feeding isn't recommended.
Hydroxyzine Counseling: Patient advised that the medication is sedating and not to drive a car after taking this medication.  Patient informed of potential adverse effects including but not limited to dry mouth, urinary retention, and blurry vision.  The patient verbalized understanding of the proper use and possible adverse effects of hydroxyzine.  All of the patient's questions and concerns were addressed.
Taltz Counseling: I discussed with the patient the risks of ixekizumab including but not limited to immunosuppression, serious infections, worsening of inflammatory bowel disease and drug reactions.  The patient understands that monitoring is required including a PPD at baseline and must alert us or the primary physician if symptoms of infection or other concerning signs are noted.
Bactrim Pregnancy And Lactation Text: This medication is Pregnancy Category D and is known to cause fetal risk.  It is also excreted in breast milk.
Spironolactone Pregnancy And Lactation Text: This medication can cause feminization of the male fetus and should be avoided during pregnancy. The active metabolite is also found in breast milk.
Bexarotene Counseling:  I discussed with the patient the risks of bexarotene including but not limited to hair loss, dry lips/skin/eyes, liver abnormalities, hyperlipidemia, pancreatitis, depression/suicidal ideation, photosensitivity, drug rash/allergic reactions, hypothyroidism, anemia, leukopenia, infection, cataracts, and teratogenicity.  Patient understands that they will need regular blood tests to check lipid profile, liver function tests, white blood cell count, thyroid function tests and pregnancy test if applicable.
Dapsone Pregnancy And Lactation Text: This medication is Pregnancy Category C and is not considered safe during pregnancy or breast feeding.
Zyclara Counseling:  I discussed with the patient the risks of imiquimod including but not limited to erythema, scaling, itching, weeping, crusting, and pain.  Patient understands that the inflammatory response to imiquimod is variable from person to person and was educated regarded proper titration schedule.  If flu-like symptoms develop, patient knows to discontinue the medication and contact us.
Fluconazole Counseling:  Patient counseled regarding adverse effects of fluconazole including but not limited to headache, diarrhea, nausea, upset stomach, liver function test abnormalities, taste disturbance, and stomach pain.  There is a rare possibility of liver failure that can occur when taking fluconazole.  The patient understands that monitoring of LFTs and kidney function test may be required, especially at baseline. The patient verbalized understanding of the proper use and possible adverse effects of fluconazole.  All of the patient's questions and concerns were addressed.
Simponi Pregnancy And Lactation Text: The risk during pregnancy and breastfeeding is uncertain with this medication.
Minoxidil Pregnancy And Lactation Text: This medication has not been assigned a Pregnancy Risk Category but animal studies failed to show danger with the topical medication. It is unknown if the medication is excreted in breast milk.
Nsaids Counseling: NSAID Counseling: I discussed with the patient that NSAIDs should be taken with food. Prolonged use of NSAIDs can result in the development of stomach ulcers.  Patient advised to stop taking NSAIDs if abdominal pain occurs.  The patient verbalized understanding of the proper use and possible adverse effects of NSAIDs.  All of the patient's questions and concerns were addressed.
Topical Clindamycin Counseling: Patient counseled that this medication may cause skin irritation or allergic reactions.  In the event of skin irritation, the patient was advised to reduce the amount of the drug applied or use it less frequently.   The patient verbalized understanding of the proper use and possible adverse effects of clindamycin.  All of the patient's questions and concerns were addressed.
Cellcept Pregnancy And Lactation Text: This medication is Pregnancy Category D and isn't considered safe during pregnancy. It is unknown if this medication is excreted in breast milk.
Cosentyx Counseling:  I discussed with the patient the risks of Cosentyx including but not limited to worsening of Crohn's disease, immunosuppression, allergic reactions and infections.  The patient understands that monitoring is required including a PPD at baseline and must alert us or the primary physician if symptoms of infection or other concerning signs are noted.
Infliximab Counseling:  I discussed with the patient the risks of infliximab including but not limited to myelosuppression, immunosuppression, autoimmune hepatitis, demyelinating diseases, lymphoma, and serious infections.  The patient understands that monitoring is required including a PPD at baseline and must alert us or the primary physician if symptoms of infection or other concerning signs are noted.
Itraconazole Counseling:  I discussed with the patient the risks of itraconazole including but not limited to liver damage, nausea/vomiting, neuropathy, and severe allergy.  The patient understands that this medication is best absorbed when taken with acidic beverages such as non-diet cola or ginger ale.  The patient understands that monitoring is required including baseline LFTs and repeat LFTs at intervals.  The patient understands that they are to contact us or the primary physician if concerning signs are noted.
Cyclosporine Pregnancy And Lactation Text: This medication is Pregnancy Category C and it isn't know if it is safe during pregnancy. This medication is excreted in breast milk.
Methotrexate Counseling:  Patient counseled regarding adverse effects of methotrexate including but not limited to nausea, vomiting, abnormalities in liver function tests. Patients may develop mouth sores, rash, diarrhea, and abnormalities in blood counts. The patient understands that monitoring is required including LFT's and blood counts.  There is a rare possibility of scarring of the liver and lung problems that can occur when taking methotrexate. Persistent nausea, loss of appetite, pale stools, dark urine, cough, and shortness of breath should be reported immediately. Patient advised to discontinue methotrexate treatment at least three months before attempting to become pregnant.  I discussed the need for folate supplements while taking methotrexate.  These supplements can decrease side effects during methotrexate treatment. The patient verbalized understanding of the proper use and possible adverse effects of methotrexate.  All of the patient's questions and concerns were addressed.
5-Fu Pregnancy And Lactation Text: This medication is Pregnancy Category X and contraindicated in pregnancy and in women who may become pregnant. It is unknown if this medication is excreted in breast milk.
Rifampin Counseling: I discussed with the patient the risks of rifampin including but not limited to liver damage, kidney damage, red-orange body fluids, nausea/vomiting and severe allergy.
Hydroxyzine Pregnancy And Lactation Text: This medication is not safe during pregnancy and should not be taken. It is also excreted in breast milk and breast feeding isn't recommended.
Oxybutynin Counseling:  I discussed with the patient the risks of oxybutynin including but not limited to skin rash, drowsiness, dry mouth, difficulty urinating, and blurred vision.
Azathioprine Counseling:  I discussed with the patient the risks of azathioprine including but not limited to myelosuppression, immunosuppression, hepatotoxicity, lymphoma, and infections.  The patient understands that monitoring is required including baseline LFTs, Creatinine, possible TPMP genotyping and weekly CBCs for the first month and then every 2 weeks thereafter.  The patient verbalized understanding of the proper use and possible adverse effects of azathioprine.  All of the patient's questions and concerns were addressed.
Imiquimod Counseling:  I discussed with the patient the risks of imiquimod including but not limited to erythema, scaling, itching, weeping, crusting, and pain.  Patient understands that the inflammatory response to imiquimod is variable from person to person and was educated regarded proper titration schedule.  If flu-like symptoms develop, patient knows to discontinue the medication and contact us.
Griseofulvin Pregnancy And Lactation Text: This medication is Pregnancy Category X and is known to cause serious birth defects. It is unknown if this medication is excreted in breast milk but breast feeding should be avoided.
Hydroquinone Counseling:  Patient advised that medication may result in skin irritation, lightening (hypopigmentation), dryness, and burning.  In the event of skin irritation, the patient was advised to reduce the amount of the drug applied or use it less frequently.  Rarely, spots that are treated with hydroquinone can become darker (pseudoochronosis).  Should this occur, patient instructed to stop medication and call the office. The patient verbalized understanding of the proper use and possible adverse effects of hydroquinone.  All of the patient's questions and concerns were addressed.
Odomzo Counseling- I discussed with the patient the risks of Odomzo including but not limited to nausea, vomiting, diarrhea, constipation, weight loss, changes in the sense of taste, decreased appetite, muscle spasms, and hair loss.  The patient verbalized understanding of the proper use and possible adverse effects of Odomzo.  All of the patient's questions and concerns were addressed.
Doxycycline Pregnancy And Lactation Text: This medication is Pregnancy Category D and not consider safe during pregnancy. It is also excreted in breast milk but is considered safe for shorter treatment courses.
Clofazimine Counseling:  I discussed with the patient the risks of clofazimine including but not limited to skin and eye pigmentation, liver damage, nausea/vomiting, gastrointestinal bleeding and allergy.
Dupixent Pregnancy And Lactation Text: This medication likely crosses the placenta but the risk for the fetus is uncertain. This medication is excreted in breast milk.
Enbrel Counseling:  I discussed with the patient the risks of etanercept including but not limited to myelosuppression, immunosuppression, autoimmune hepatitis, demyelinating diseases, lymphoma, and infections.  The patient understands that monitoring is required including a PPD at baseline and must alert us or the primary physician if symptoms of infection or other concerning signs are noted.
Humira Counseling:  I discussed with the patient the risks of adalimumab including but not limited to myelosuppression, immunosuppression, autoimmune hepatitis, demyelinating diseases, lymphoma, and serious infections.  The patient understands that monitoring is required including a PPD at baseline and must alert us or the primary physician if symptoms of infection or other concerning signs are noted.
Hydroxychloroquine Pregnancy And Lactation Text: This medication has been shown to cause fetal harm but it isn't assigned a Pregnancy Risk Category. There are small amounts excreted in breast milk.
Dapsone Counseling: I discussed with the patient the risks of dapsone including but not limited to hemolytic anemia, agranulocytosis, rashes, methemoglobinemia, kidney failure, peripheral neuropathy, headaches, GI upset, and liver toxicity.  Patients who start dapsone require monitoring including baseline LFTs and weekly CBCs for the first month, then every month thereafter.  The patient verbalized understanding of the proper use and possible adverse effects of dapsone.  All of the patient's questions and concerns were addressed.
SSKI Counseling:  I discussed with the patient the risks of SSKI including but not limited to thyroid abnormalities, metallic taste, GI upset, fever, headache, acne, arthralgias, paraesthesias, lymphadenopathy, easy bleeding, arrhythmias, and allergic reaction.
Eucrisa Counseling: Patient may experience a mild burning sensation during topical application. Eucrisa is not approved in children less than 2 years of age.
High Dose Vitamin A Pregnancy And Lactation Text: High dose vitamin A therapy is contraindicated during pregnancy and breast feeding.
Otezla Counseling: The side effects of Otezla were discussed with the patient, including but not limited to worsening or new depression, weight loss, diarrhea, nausea, upper respiratory tract infection, and headache. Patient instructed to call the office should any adverse effect occur.  The patient verbalized understanding of the proper use and possible adverse effects of Otezla.  All the patient's questions and concerns were addressed.
Sski Pregnancy And Lactation Text: This medication is Pregnancy Category D and isn't considered safe during pregnancy. It is excreted in breast milk.
Xelrikaz Pregnancy And Lactation Text: This medication is Pregnancy Category D and is not considered safe during pregnancy.  The risk during breast feeding is also uncertain.
Erivedge Counseling- I discussed with the patient the risks of Erivedge including but not limited to nausea, vomiting, diarrhea, constipation, weight loss, changes in the sense of taste, decreased appetite, muscle spasms, and hair loss.  The patient verbalized understanding of the proper use and possible adverse effects of Erivedge.  All of the patient's questions and concerns were addressed.
Quinolones Counseling:  I discussed with the patient the risks of fluoroquinolones including but not limited to GI upset, allergic reaction, drug rash, diarrhea, dizziness, photosensitivity, yeast infections, liver function test abnormalities, tendonitis/tendon rupture.
Simponi Counseling:  I discussed with the patient the risks of golimumab including but not limited to myelosuppression, immunosuppression, autoimmune hepatitis, demyelinating diseases, lymphoma, and serious infections.  The patient understands that monitoring is required including a PPD at baseline and must alert us or the primary physician if symptoms of infection or other concerning signs are noted.
Acitretin Counseling:  I discussed with the patient the risks of acitretin including but not limited to hair loss, dry lips/skin/eyes, liver damage, hyperlipidemia, depression/suicidal ideation, photosensitivity.  Serious rare side effects can include but are not limited to pancreatitis, pseudotumor cerebri, bony changes, clot formation/stroke/heart attack.  Patient understands that alcohol is contraindicated since it can result in liver toxicity and significantly prolong the elimination of the drug by many years.
Cephalexin Counseling: I counseled the patient regarding use of cephalexin as an antibiotic for prophylactic and/or therapeutic purposes. Cephalexin (commonly prescribed under brand name Keflex) is a cephalosporin antibiotic which is active against numerous classes of bacteria, including most skin bacteria. Side effects may include nausea, diarrhea, gastrointestinal upset, rash, hives, yeast infections, and in rare cases, hepatitis, kidney disease, seizures, fever, confusion, neurologic symptoms, and others. Patients with severe allergies to penicillin medications are cautioned that there is about a 10% incidence of cross-reactivity with cephalosporins. When possible, patients with penicillin allergies should use alternatives to cephalosporins for antibiotic therapy.
Terbinafine Counseling: Patient counseling regarding adverse effects of terbinafine including but not limited to headache, diarrhea, rash, upset stomach, liver function test abnormalities, itching, taste/smell disturbance, nausea, abdominal pain, and flatulence.  There is a rare possibility of liver failure that can occur when taking terbinafine.  The patient understands that a baseline LFT and kidney function test may be required. The patient verbalized understanding of the proper use and possible adverse effects of terbinafine.  All of the patient's questions and concerns were addressed.
Picato Counseling:  I discussed with the patient the risks of Picato including but not limited to erythema, scaling, itching, weeping, crusting, and pain.
Tazorac Pregnancy And Lactation Text: This medication is not safe during pregnancy. It is unknown if this medication is excreted in breast milk.
Doxycycline Counseling:  Patient counseled regarding possible photosensitivity and increased risk for sunburn.  Patient instructed to avoid sunlight, if possible.  When exposed to sunlight, patients should wear protective clothing, sunglasses, and sunscreen.  The patient was instructed to call the office immediately if the following severe adverse effects occur:  hearing changes, easy bruising/bleeding, severe headache, or vision changes.  The patient verbalized understanding of the proper use and possible adverse effects of doxycycline.  All of the patient's questions and concerns were addressed.
Nsaids Pregnancy And Lactation Text: These medications are considered safe up to 30 weeks gestation. It is excreted in breast milk.
Tremfya Counseling: I discussed with the patient the risks of guselkumab including but not limited to immunosuppression, serious infections, worsening of inflammatory bowel disease and drug reactions.  The patient understands that monitoring is required including a PPD at baseline and must alert us or the primary physician if symptoms of infection or other concerning signs are noted.
Minoxidil Counseling: Minoxidil is a topical medication which can increase blood flow where it is applied. It is uncertain how this medication increases hair growth. Side effects are uncommon and include stinging and allergic reactions.
Birth Control Pills Counseling: Birth Control Pill Counseling: I discussed with the patient the potential side effects of OCPs including but not limited to increased risk of stroke, heart attack, thrombophlebitis, deep venous thrombosis, hepatic adenomas, breast changes, GI upset, headaches, and depression.  The patient verbalized understanding of the proper use and possible adverse effects of OCPs. All of the patient's questions and concerns were addressed.
Cellcept Counseling:  I discussed with the patient the risks of mycophenolate mofetil including but not limited to infection/immunosuppression, GI upset, hypokalemia, hypercholesterolemia, bone marrow suppression, lymphoproliferative disorders, malignancy, GI ulceration/bleed/perforation, colitis, interstitial lung disease, kidney failure, progressive multifocal leukoencephalopathy, and birth defects.  The patient understands that monitoring is required including a baseline creatinine and regular CBC testing. In addition, patient must alert us immediately if symptoms of infection or other concerning signs are noted.
Bexarotene Pregnancy And Lactation Text: This medication is Pregnancy Category X and should not be given to women who are pregnant or may become pregnant. This medication should not be used if you are breast feeding.
Rituxan Counseling:  I discussed with the patient the risks of Rituxan infusions. Side effects can include infusion reactions, severe drug rashes including mucocutaneous reactions, reactivation of latent hepatitis and other infections and rarely progressive multifocal leukoencephalopathy.  All of the patient's questions and concerns were addressed.
Use Enhanced Medication Counseling?: No
Stelara Counseling:  I discussed with the patient the risks of ustekinumab including but not limited to immunosuppression, malignancy, posterior leukoencephalopathy syndrome, and serious infections.  The patient understands that monitoring is required including a PPD at baseline and must alert us or the primary physician if symptoms of infection or other concerning signs are noted.
Topical Sulfur Applications Pregnancy And Lactation Text: This medication is Pregnancy Category C and has an unknown safety profile during pregnancy. It is unknown if this topical medication is excreted in breast milk.
Metronidazole Pregnancy And Lactation Text: This medication is Pregnancy Category B and considered safe during pregnancy.  It is also excreted in breast milk.
Benzoyl Peroxide Counseling: Patient counseled that medicine may cause skin irritation and bleach clothing.  In the event of skin irritation, the patient was advised to reduce the amount of the drug applied or use it less frequently.   The patient verbalized understanding of the proper use and possible adverse effects of benzoyl peroxide.  All of the patient's questions and concerns were addressed.
Erythromycin Counseling:  I discussed with the patient the risks of erythromycin including but not limited to GI upset, allergic reaction, drug rash, diarrhea, increase in liver enzymes, and yeast infections.
Griseofulvin Counseling:  I discussed with the patient the risks of griseofulvin including but not limited to photosensitivity, cytopenia, liver damage, nausea/vomiting and severe allergy.  The patient understands that this medication is best absorbed when taken with a fatty meal (e.g., ice cream or french fries).
Azithromycin Pregnancy And Lactation Text: This medication is considered safe during pregnancy and is also secreted in breast milk.
Metronidazole Counseling:  I discussed with the patient the risks of metronidazole including but not limited to seizures, nausea/vomiting, a metallic taste in the mouth, nausea/vomiting and severe allergy.
Xolair Pregnancy And Lactation Text: This medication is Pregnancy Category B and is considered safe during pregnancy. This medication is excreted in breast milk.
Isotretinoin Counseling: Patient should get monthly blood tests, not donate blood, not drive at night if vision affected, not share medication, and not undergo elective surgery for 6 months after tx completed. Side effects reviewed, pt to contact office should one occur.
Drysol Pregnancy And Lactation Text: This medication is considered safe during pregnancy and breast feeding.
Valtrex Pregnancy And Lactation Text: this medication is Pregnancy Category B and is considered safe during pregnancy. This medication is not directly found in breast milk but it's metabolite acyclovir is present.
Protopic Counseling: Patient may experience a mild burning sensation during topical application. Protopic is not approved in children less than 2 years of age. There have been case reports of hematologic and skin malignancies in patients using topical calcineurin inhibitors although causality is questionable.
Cephalexin Pregnancy And Lactation Text: This medication is Pregnancy Category B and considered safe during pregnancy.  It is also excreted in breast milk but can be used safely for shorter doses.
Doxepin Counseling:  Patient advised that the medication is sedating and not to drive a car after taking this medication. Patient informed of potential adverse effects including but not limited to dry mouth, urinary retention, and blurry vision.  The patient verbalized understanding of the proper use and possible adverse effects of doxepin.  All of the patient's questions and concerns were addressed.
Solaraze Counseling:  I discussed with the patient the risks of Solaraze including but not limited to erythema, scaling, itching, weeping, crusting, and pain.
5-Fu Counseling: 5-Fluorouracil Counseling:  I discussed with the patient the risks of 5-fluorouracil including but not limited to erythema, scaling, itching, weeping, crusting, and pain.
Carac Counseling:  I discussed with the patient the risks of Carac including but not limited to erythema, scaling, itching, weeping, crusting, and pain.
Glycopyrrolate Pregnancy And Lactation Text: This medication is Pregnancy Category B and is considered safe during pregnancy. It is unknown if it is excreted breast milk.
Colchicine Counseling:  Patient counseled regarding adverse effects including but not limited to stomach upset (nausea, vomiting, stomach pain, or diarrhea).  Patient instructed to limit alcohol consumption while taking this medication.  Colchicine may reduce blood counts especially with prolonged use.  The patient understands that monitoring of kidney function and blood counts may be required, especially at baseline. The patient verbalized understanding of the proper use and possible adverse effects of colchicine.  All of the patient's questions and concerns were addressed.
Otezla Pregnancy And Lactation Text: This medication is Pregnancy Category C and it isn't known if it is safe during pregnancy. It is unknown if it is excreted in breast milk.
Methotrexate Pregnancy And Lactation Text: This medication is Pregnancy Category X and is known to cause fetal harm. This medication is excreted in breast milk.
Erythromycin Pregnancy And Lactation Text: This medication is Pregnancy Category B and is considered safe during pregnancy. It is also excreted in breast milk.
Ketoconazole Counseling:   Patient counseled regarding improving absorption with orange juice.  Adverse effects include but are not limited to breast enlargement, headache, diarrhea, nausea, upset stomach, liver function test abnormalities, taste disturbance, and stomach pain.  There is a rare possibility of liver failure that can occur when taking ketoconazole. The patient understands that monitoring of LFTs may be required, especially at baseline. The patient verbalized understanding of the proper use and possible adverse effects of ketoconazole.  All of the patient's questions and concerns were addressed.
Prednisone Counseling:  I discussed with the patient the risks of prolonged use of prednisone including but not limited to weight gain, insomnia, osteoporosis, mood changes, diabetes, susceptibility to infection, glaucoma and high blood pressure.  In cases where prednisone use is prolonged, patients should be monitored with blood pressure checks, serum glucose levels and an eye exam.  Additionally, the patient may need to be placed on GI prophylaxis, PCP prophylaxis, and calcium and vitamin D supplementation and/or a bisphosphonate.  The patient verbalized understanding of the proper use and the possible adverse effects of prednisone.  All of the patient's questions and concerns were addressed.
Topical Sulfur Applications Counseling: Topical Sulfur Counseling: Patient counseled that this medication may cause skin irritation or allergic reactions.  In the event of skin irritation, the patient was advised to reduce the amount of the drug applied or use it less frequently.   The patient verbalized understanding of the proper use and possible adverse effects of topical sulfur application.  All of the patient's questions and concerns were addressed.
Cimetidine Counseling:  I discussed with the patient the risks of Cimetidine including but not limited to gynecomastia, headache, diarrhea, nausea, drowsiness, arrhythmias, pancreatitis, skin rashes, psychosis, bone marrow suppression and kidney toxicity.
High Dose Vitamin A Counseling: Side effects reviewed, pt to contact office should one occur.
Thalidomide Counseling: I discussed with the patient the risks of thalidomide including but not limited to birth defects, anxiety, weakness, chest pain, dizziness, cough and severe allergy.
Topical Retinoid counseling:  Patient advised to apply a pea-sized amount only at bedtime and wait 30 minutes after washing their face before applying.  If too drying, patient may add a non-comedogenic moisturizer. The patient verbalized understanding of the proper use and possible adverse effects of retinoids.  All of the patient's questions and concerns were addressed.
Bactrim Counseling:  I discussed with the patient the risks of sulfa antibiotics including but not limited to GI upset, allergic reaction, drug rash, diarrhea, dizziness, photosensitivity, and yeast infections.  Rarely, more serious reactions can occur including but not limited to aplastic anemia, agranulocytosis, methemoglobinemia, blood dyscrasias, liver or kidney failure, lung infiltrates or desquamative/blistering drug rashes.
Glycopyrrolate Counseling:  I discussed with the patient the risks of glycopyrrolate including but not limited to skin rash, drowsiness, dry mouth, difficulty urinating, and blurred vision.
Protopic Pregnancy And Lactation Text: This medication is Pregnancy Category C. It is unknown if this medication is excreted in breast milk when applied topically.
Isotretinoin Pregnancy And Lactation Text: This medication is Pregnancy Category X and is considered extremely dangerous during pregnancy. It is unknown if it is excreted in breast milk.
Valtrex Counseling: I discussed with the patient the risks of valacyclovir including but not limited to kidney damage, nausea, vomiting and severe allergy.  The patient understands that if the infection seems to be worsening or is not improving, they are to call.
Spironolactone Counseling: Patient advised regarding risks of diarrhea, abdominal pain, hyperkalemia, birth defects (for female patients), liver toxicity and renal toxicity. The patient may need blood work to monitor liver and kidney function and potassium levels while on therapy. The patient verbalized understanding of the proper use and possible adverse effects of spironolactone.  All of the patient's questions and concerns were addressed.
Albendazole Counseling:  I discussed with the patient the risks of albendazole including but not limited to cytopenia, kidney damage, nausea/vomiting and severe allergy.  The patient understands that this medication is being used in an off-label manner.
Dupixent Counseling: I discussed with the patient the risks of dupilumab including but not limited to eye infection and irritation, cold sores, injection site reactions, worsening of asthma, allergic reactions and increased risk of parasitic infection.  Live vaccines should be avoided while taking dupilumab. Dupilumab will also interact with certain medications such as warfarin and cyclosporine. The patient understands that monitoring is required and they must alert us or the primary physician if symptoms of infection or other concerning signs are noted.
Doxepin Pregnancy And Lactation Text: This medication is Pregnancy Category C and it isn't known if it is safe during pregnancy. It is also excreted in breast milk and breast feeding isn't recommended.
Elidel Counseling: Patient may experience a mild burning sensation during topical application. Elidel is not approved in children less than 2 years of age. There have been case reports of hematologic and skin malignancies in patients using topical calcineurin inhibitors although causality is questionable.
Tetracycline Counseling: Patient counseled regarding possible photosensitivity and increased risk for sunburn.  Patient instructed to avoid sunlight, if possible.  When exposed to sunlight, patients should wear protective clothing, sunglasses, and sunscreen.  The patient was instructed to call the office immediately if the following severe adverse effects occur:  hearing changes, easy bruising/bleeding, severe headache, or vision changes.  The patient verbalized understanding of the proper use and possible adverse effects of tetracycline.  All of the patient's questions and concerns were addressed. Patient understands to avoid pregnancy while on therapy due to potential birth defects.

## 2018-05-02 ENCOUNTER — RX ONLY (OUTPATIENT)
Age: 68
Setting detail: RX ONLY
End: 2018-05-02

## 2018-05-02 RX ORDER — IMIQUIMOD 50 MG/G
CREAM TOPICAL
Qty: 6 | Refills: 1 | Status: ERX | COMMUNITY
Start: 2018-05-02

## 2018-07-16 ENCOUNTER — APPOINTMENT (OUTPATIENT)
Dept: RADIOLOGY | Facility: MEDICAL CENTER | Age: 68
End: 2018-07-16
Attending: FAMILY MEDICINE
Payer: MEDICARE

## 2018-09-11 ENCOUNTER — APPOINTMENT (RX ONLY)
Dept: URBAN - METROPOLITAN AREA CLINIC 4 | Facility: CLINIC | Age: 68
Setting detail: DERMATOLOGY
End: 2018-09-11

## 2018-09-11 DIAGNOSIS — D18.0 HEMANGIOMA: ICD-10-CM

## 2018-09-11 DIAGNOSIS — Z09 ENCOUNTER FOR FOLLOW-UP EXAMINATION AFTER COMPLETED TREATMENT FOR CONDITIONS OTHER THAN MALIGNANT NEOPLASM: ICD-10-CM

## 2018-09-11 DIAGNOSIS — Z85.828 PERSONAL HISTORY OF OTHER MALIGNANT NEOPLASM OF SKIN: ICD-10-CM

## 2018-09-11 DIAGNOSIS — Z85.820 PERSONAL HISTORY OF MALIGNANT MELANOMA OF SKIN: ICD-10-CM

## 2018-09-11 DIAGNOSIS — D22 MELANOCYTIC NEVI: ICD-10-CM

## 2018-09-11 DIAGNOSIS — L81.4 OTHER MELANIN HYPERPIGMENTATION: ICD-10-CM

## 2018-09-11 DIAGNOSIS — L82.1 OTHER SEBORRHEIC KERATOSIS: ICD-10-CM

## 2018-09-11 PROBLEM — D18.01 HEMANGIOMA OF SKIN AND SUBCUTANEOUS TISSUE: Status: ACTIVE | Noted: 2018-09-11

## 2018-09-11 PROBLEM — D22.5 MELANOCYTIC NEVI OF TRUNK: Status: ACTIVE | Noted: 2018-09-11

## 2018-09-11 PROCEDURE — 99213 OFFICE O/P EST LOW 20 MIN: CPT

## 2018-09-11 PROCEDURE — ? OBSERVATION

## 2018-09-11 PROCEDURE — ? COUNSELING

## 2018-09-11 ASSESSMENT — LOCATION SIMPLE DESCRIPTION DERM
LOCATION SIMPLE: RIGHT NOSE
LOCATION SIMPLE: LEFT UPPER BACK
LOCATION SIMPLE: RIGHT UPPER BACK
LOCATION SIMPLE: LEFT CHEEK
LOCATION SIMPLE: RIGHT LOWER BACK
LOCATION SIMPLE: LEFT ELBOW
LOCATION SIMPLE: LEFT UPPER ARM
LOCATION SIMPLE: RIGHT UPPER ARM

## 2018-09-11 ASSESSMENT — LOCATION DETAILED DESCRIPTION DERM
LOCATION DETAILED: LEFT DISTAL POSTERIOR UPPER ARM
LOCATION DETAILED: RIGHT NASAL ALA
LOCATION DETAILED: RIGHT SUPERIOR MEDIAL MIDBACK
LOCATION DETAILED: RIGHT MEDIAL UPPER BACK
LOCATION DETAILED: LEFT INFERIOR MEDIAL UPPER BACK
LOCATION DETAILED: LEFT CENTRAL MALAR CHEEK
LOCATION DETAILED: RIGHT PROXIMAL POSTERIOR UPPER ARM
LOCATION DETAILED: LEFT ELBOW
LOCATION DETAILED: LEFT SUPERIOR MEDIAL BUCCAL CHEEK

## 2018-09-11 ASSESSMENT — LOCATION ZONE DERM
LOCATION ZONE: NOSE
LOCATION ZONE: ARM
LOCATION ZONE: FACE
LOCATION ZONE: TRUNK

## 2019-02-12 ENCOUNTER — HOSPITAL ENCOUNTER (OUTPATIENT)
Dept: LAB | Facility: MEDICAL CENTER | Age: 69
End: 2019-02-12
Attending: UROLOGY
Payer: MEDICARE

## 2019-02-12 DIAGNOSIS — Z01.812 PRE-OPERATIVE LABORATORY EXAMINATION: ICD-10-CM

## 2019-02-12 DIAGNOSIS — Z01.810 PRE-OPERATIVE CARDIOVASCULAR EXAMINATION: ICD-10-CM

## 2019-02-12 LAB
ANION GAP SERPL CALC-SCNC: 8 MMOL/L (ref 0–11.9)
BASOPHILS # BLD AUTO: 0.6 % (ref 0–1.8)
BASOPHILS # BLD: 0.05 K/UL (ref 0–0.12)
BUN SERPL-MCNC: 19 MG/DL (ref 8–22)
CALCIUM SERPL-MCNC: 9.2 MG/DL (ref 8.5–10.5)
CHLORIDE SERPL-SCNC: 105 MMOL/L (ref 96–112)
CO2 SERPL-SCNC: 27 MMOL/L (ref 20–33)
CREAT SERPL-MCNC: 0.79 MG/DL (ref 0.5–1.4)
EKG IMPRESSION: NORMAL
EOSINOPHIL # BLD AUTO: 0.12 K/UL (ref 0–0.51)
EOSINOPHIL NFR BLD: 1.5 % (ref 0–6.9)
ERYTHROCYTE [DISTWIDTH] IN BLOOD BY AUTOMATED COUNT: 43.8 FL (ref 35.9–50)
GLUCOSE SERPL-MCNC: 92 MG/DL (ref 65–99)
HCT VFR BLD AUTO: 44 % (ref 42–52)
HGB BLD-MCNC: 15.1 G/DL (ref 14–18)
IMM GRANULOCYTES # BLD AUTO: 0.05 K/UL (ref 0–0.11)
IMM GRANULOCYTES NFR BLD AUTO: 0.6 % (ref 0–0.9)
LYMPHOCYTES # BLD AUTO: 1.4 K/UL (ref 1–4.8)
LYMPHOCYTES NFR BLD: 17.9 % (ref 22–41)
MCH RBC QN AUTO: 34.1 PG (ref 27–33)
MCHC RBC AUTO-ENTMCNC: 34.3 G/DL (ref 33.7–35.3)
MCV RBC AUTO: 99.3 FL (ref 81.4–97.8)
MONOCYTES # BLD AUTO: 0.52 K/UL (ref 0–0.85)
MONOCYTES NFR BLD AUTO: 6.7 % (ref 0–13.4)
NEUTROPHILS # BLD AUTO: 5.67 K/UL (ref 1.82–7.42)
NEUTROPHILS NFR BLD: 72.7 % (ref 44–72)
NRBC # BLD AUTO: 0 K/UL
NRBC BLD-RTO: 0 /100 WBC
PLATELET # BLD AUTO: 252 K/UL (ref 164–446)
PMV BLD AUTO: 9.1 FL (ref 9–12.9)
POTASSIUM SERPL-SCNC: 4.2 MMOL/L (ref 3.6–5.5)
RBC # BLD AUTO: 4.43 M/UL (ref 4.7–6.1)
SODIUM SERPL-SCNC: 140 MMOL/L (ref 135–145)
WBC # BLD AUTO: 7.8 K/UL (ref 4.8–10.8)

## 2019-02-12 PROCEDURE — 80048 BASIC METABOLIC PNL TOTAL CA: CPT

## 2019-02-12 PROCEDURE — 36415 COLL VENOUS BLD VENIPUNCTURE: CPT

## 2019-02-12 PROCEDURE — 93010 ELECTROCARDIOGRAM REPORT: CPT | Performed by: INTERNAL MEDICINE

## 2019-02-12 PROCEDURE — 93005 ELECTROCARDIOGRAM TRACING: CPT

## 2019-02-12 PROCEDURE — 85025 COMPLETE CBC W/AUTO DIFF WBC: CPT

## 2019-02-12 PROCEDURE — 84154 ASSAY OF PSA FREE: CPT

## 2019-02-12 PROCEDURE — 84153 ASSAY OF PSA TOTAL: CPT

## 2019-02-14 LAB
PSA FREE MFR SERPL: 20 %
PSA FREE SERPL-MCNC: 1.7 NG/ML
PSA SERPL-MCNC: 8.5 NG/ML (ref 0–4)

## 2019-02-18 ENCOUNTER — HOSPITAL ENCOUNTER (OUTPATIENT)
Facility: MEDICAL CENTER | Age: 69
End: 2019-02-18
Attending: OPHTHALMOLOGY | Admitting: OPHTHALMOLOGY
Payer: MEDICARE

## 2019-02-18 VITALS
SYSTOLIC BLOOD PRESSURE: 137 MMHG | WEIGHT: 184.3 LBS | DIASTOLIC BLOOD PRESSURE: 87 MMHG | TEMPERATURE: 97.4 F | OXYGEN SATURATION: 97 % | BODY MASS INDEX: 27.93 KG/M2 | HEIGHT: 68 IN | HEART RATE: 60 BPM | RESPIRATION RATE: 18 BRPM

## 2019-02-18 PROCEDURE — 700101 HCHG RX REV CODE 250

## 2019-02-18 PROCEDURE — 160009 HCHG ANES TIME/MIN: Performed by: OPHTHALMOLOGY

## 2019-02-18 PROCEDURE — 160048 HCHG OR STATISTICAL LEVEL 1-5: Performed by: OPHTHALMOLOGY

## 2019-02-18 PROCEDURE — 700111 HCHG RX REV CODE 636 W/ 250 OVERRIDE (IP)

## 2019-02-18 PROCEDURE — 160036 HCHG PACU - EA ADDL 30 MINS PHASE I: Performed by: OPHTHALMOLOGY

## 2019-02-18 PROCEDURE — 160029 HCHG SURGERY MINUTES - 1ST 30 MINS LEVEL 4: Performed by: OPHTHALMOLOGY

## 2019-02-18 PROCEDURE — 160035 HCHG PACU - 1ST 60 MINS PHASE I: Performed by: OPHTHALMOLOGY

## 2019-02-18 PROCEDURE — 160041 HCHG SURGERY MINUTES - EA ADDL 1 MIN LEVEL 4: Performed by: OPHTHALMOLOGY

## 2019-02-18 PROCEDURE — 700111 HCHG RX REV CODE 636 W/ 250 OVERRIDE (IP): Performed by: OPHTHALMOLOGY

## 2019-02-18 PROCEDURE — 501836 HCHG SUTURE EYE: Performed by: OPHTHALMOLOGY

## 2019-02-18 PROCEDURE — A6402 STERILE GAUZE <= 16 SQ IN: HCPCS | Performed by: OPHTHALMOLOGY

## 2019-02-18 PROCEDURE — 160002 HCHG RECOVERY MINUTES (STAT): Performed by: OPHTHALMOLOGY

## 2019-02-18 RX ORDER — SODIUM CHLORIDE, SODIUM LACTATE, POTASSIUM CHLORIDE, CALCIUM CHLORIDE 600; 310; 30; 20 MG/100ML; MG/100ML; MG/100ML; MG/100ML
INJECTION, SOLUTION INTRAVENOUS ONCE
Status: COMPLETED | OUTPATIENT
Start: 2019-02-18 | End: 2019-02-18

## 2019-02-18 RX ORDER — MIDAZOLAM HYDROCHLORIDE 1 MG/ML
INJECTION INTRAMUSCULAR; INTRAVENOUS
Status: DISCONTINUED
Start: 2019-02-18 | End: 2019-02-18 | Stop reason: HOSPADM

## 2019-02-18 RX ORDER — HYDRALAZINE HYDROCHLORIDE 20 MG/ML
10 INJECTION INTRAMUSCULAR; INTRAVENOUS
Status: DISCONTINUED | OUTPATIENT
Start: 2019-02-18 | End: 2019-02-18 | Stop reason: HOSPADM

## 2019-02-18 RX ORDER — MIDAZOLAM HYDROCHLORIDE 1 MG/ML
1 INJECTION INTRAMUSCULAR; INTRAVENOUS
Status: DISCONTINUED | OUTPATIENT
Start: 2019-02-18 | End: 2019-02-18 | Stop reason: HOSPADM

## 2019-02-18 RX ORDER — MEPERIDINE HYDROCHLORIDE 25 MG/ML
12.5 INJECTION INTRAMUSCULAR; INTRAVENOUS; SUBCUTANEOUS
Status: DISCONTINUED | OUTPATIENT
Start: 2019-02-18 | End: 2019-02-18 | Stop reason: HOSPADM

## 2019-02-18 RX ORDER — HYDROMORPHONE HYDROCHLORIDE 1 MG/ML
0.2 INJECTION, SOLUTION INTRAMUSCULAR; INTRAVENOUS; SUBCUTANEOUS
Status: DISCONTINUED | OUTPATIENT
Start: 2019-02-18 | End: 2019-02-18 | Stop reason: HOSPADM

## 2019-02-18 RX ORDER — ATROPINE SULFATE 10 MG/G
OINTMENT OPHTHALMIC
Status: DISCONTINUED | OUTPATIENT
Start: 2019-02-18 | End: 2019-02-18 | Stop reason: HOSPADM

## 2019-02-18 RX ORDER — LIDOCAINE HYDROCHLORIDE 20 MG/ML
INJECTION, SOLUTION EPIDURAL; INFILTRATION; INTRACAUDAL; PERINEURAL
Status: DISCONTINUED | OUTPATIENT
Start: 2019-02-18 | End: 2019-02-18 | Stop reason: HOSPADM

## 2019-02-18 RX ORDER — HALOPERIDOL 5 MG/ML
1 INJECTION INTRAMUSCULAR
Status: DISCONTINUED | OUTPATIENT
Start: 2019-02-18 | End: 2019-02-18 | Stop reason: HOSPADM

## 2019-02-18 RX ORDER — DEXAMETHASONE SODIUM PHOSPHATE 4 MG/ML
INJECTION, SOLUTION INTRA-ARTICULAR; INTRALESIONAL; INTRAMUSCULAR; INTRAVENOUS; SOFT TISSUE
Status: DISCONTINUED | OUTPATIENT
Start: 2019-02-18 | End: 2019-02-18 | Stop reason: HOSPADM

## 2019-02-18 RX ORDER — OXYCODONE HCL 5 MG/5 ML
10 SOLUTION, ORAL ORAL
Status: DISCONTINUED | OUTPATIENT
Start: 2019-02-18 | End: 2019-02-18 | Stop reason: HOSPADM

## 2019-02-18 RX ORDER — BALANCED SALT SOLUTION ENRICHED WITH BICARBONATE, DEXTROSE, AND GLUTATHIONE
KIT INTRAOCULAR
Status: DISCONTINUED | OUTPATIENT
Start: 2019-02-18 | End: 2019-02-18 | Stop reason: HOSPADM

## 2019-02-18 RX ORDER — SODIUM CHLORIDE, SODIUM LACTATE, POTASSIUM CHLORIDE, CALCIUM CHLORIDE 600; 310; 30; 20 MG/100ML; MG/100ML; MG/100ML; MG/100ML
INJECTION, SOLUTION INTRAVENOUS CONTINUOUS
Status: DISCONTINUED | OUTPATIENT
Start: 2019-02-18 | End: 2019-02-18 | Stop reason: HOSPADM

## 2019-02-18 RX ORDER — PHENYLEPHRINE HYDROCHLORIDE 25 MG/ML
SOLUTION/ DROPS OPHTHALMIC
Status: COMPLETED
Start: 2019-02-18 | End: 2019-02-18

## 2019-02-18 RX ORDER — HYDROMORPHONE HYDROCHLORIDE 1 MG/ML
0.1 INJECTION, SOLUTION INTRAMUSCULAR; INTRAVENOUS; SUBCUTANEOUS
Status: DISCONTINUED | OUTPATIENT
Start: 2019-02-18 | End: 2019-02-18 | Stop reason: HOSPADM

## 2019-02-18 RX ORDER — LIDOCAINE HYDROCHLORIDE 20 MG/ML
INJECTION, SOLUTION INFILTRATION; PERINEURAL
Status: DISCONTINUED
Start: 2019-02-18 | End: 2019-02-18 | Stop reason: HOSPADM

## 2019-02-18 RX ORDER — CYCLOPENTOLATE HYDROCHLORIDE 10 MG/ML
SOLUTION/ DROPS OPHTHALMIC
Status: COMPLETED
Start: 2019-02-18 | End: 2019-02-18

## 2019-02-18 RX ORDER — HYDROMORPHONE HYDROCHLORIDE 1 MG/ML
0.4 INJECTION, SOLUTION INTRAMUSCULAR; INTRAVENOUS; SUBCUTANEOUS
Status: DISCONTINUED | OUTPATIENT
Start: 2019-02-18 | End: 2019-02-18 | Stop reason: HOSPADM

## 2019-02-18 RX ORDER — CEFAZOLIN SODIUM 1 G/3ML
INJECTION, POWDER, FOR SOLUTION INTRAMUSCULAR; INTRAVENOUS
Status: DISCONTINUED | OUTPATIENT
Start: 2019-02-18 | End: 2019-02-18 | Stop reason: HOSPADM

## 2019-02-18 RX ORDER — BALANCED SALT SOLUTION 6.4; .75; .48; .3; 3.9; 1.7 MG/ML; MG/ML; MG/ML; MG/ML; MG/ML; MG/ML
SOLUTION OPHTHALMIC
Status: DISCONTINUED | OUTPATIENT
Start: 2019-02-18 | End: 2019-02-18 | Stop reason: HOSPADM

## 2019-02-18 RX ORDER — METOPROLOL TARTRATE 1 MG/ML
1 INJECTION, SOLUTION INTRAVENOUS
Status: DISCONTINUED | OUTPATIENT
Start: 2019-02-18 | End: 2019-02-18 | Stop reason: HOSPADM

## 2019-02-18 RX ORDER — DIPHENHYDRAMINE HYDROCHLORIDE 50 MG/ML
12.5 INJECTION INTRAMUSCULAR; INTRAVENOUS
Status: DISCONTINUED | OUTPATIENT
Start: 2019-02-18 | End: 2019-02-18 | Stop reason: HOSPADM

## 2019-02-18 RX ORDER — BALANCED SALT SOLUTION ENRICHED WITH BICARBONATE, DEXTROSE, AND GLUTATHIONE
KIT INTRAOCULAR
Status: DISCONTINUED
Start: 2019-02-18 | End: 2019-02-18 | Stop reason: HOSPADM

## 2019-02-18 RX ADMIN — CYCLOPENTOLATE HYDROCHLORIDE 1 DROP: 10 SOLUTION/ DROPS OPHTHALMIC at 12:20

## 2019-02-18 RX ADMIN — SODIUM CHLORIDE, SODIUM LACTATE, POTASSIUM CHLORIDE, CALCIUM CHLORIDE 1000 ML: 600; 310; 30; 20 INJECTION, SOLUTION INTRAVENOUS at 12:19

## 2019-02-18 RX ADMIN — PHENYLEPHRINE HYDROCHLORIDE 1 DROP: 2.5 SOLUTION/ DROPS OPHTHALMIC at 12:22

## 2019-02-18 NOTE — OP REPORT
DATE OF SERVICE:  02/18/2019    PREOPERATIVE DIAGNOSIS:  Macular pucker, left eye.    POSTOPERATIVE DIAGNOSIS:  Macular pucker, left eye.    SURGEON:  Be Keane MD    ASSISTANT:  None.    ANESTHESIOLOGIST:  Larry Contreras MD    TYPE OF ANESTHESIA:  General and local.    BLOOD LOSS:  None.    SPECIMENS REMOVED:  None.    INDICATION FOR SURGERY:  This patient presented with the findings noted above.    We discussed in detail the risks, benefits, and alternatives regarding   surgery.  The risks include were not limited to the following:  Worsening of   preexisting cataract, retinal tear, retinal detachment, high eye pressure, low   eye pressure, bleeding, infection, need for further surgery, pain induced   refractive error, complete and irreversible loss of all vision, loss of the   eye.  The patient understood that there were no structural or visual   guarantees.  He also on a separate occasion watched a video, which reviewed   the surgery including the risks and benefits.  He had a chance of all his   questions answered.  He consented to the procedure.    PROCEDURE IN DETAIL:  The patient was prepped and draped in the usual sterile   manner.  A 50% Betadine was applied to the ocular surface and rinsed off.    Trocars were placed 4 mm posterior to the limbus at 9:30, 2:30, and 3:30   o'clock.  The infusion cannula was placed in the inferotemporal sclerotomy and   verified to be correctly positioned.  Central vitrectomy was then performed.    Triesence was used to help identify the cortical vitreous, which was also   removed using the cutter.  It was somewhat adherent to the posterior pole and   we peeled it off at the posterior pole and completed the vitrectomy.  We then   directed our attention to the epiretinal membrane, which was peeled using end   gripping forceps.  The underlying internal limiting membrane was stained with   Brilliant blue G dye and also peeled it using end gripping forceps.  We looked    for peripheral breaks and scleral depression using the wide angle lens and   there were none seen.  The trocars were removed.  There were no definite leak   seen, but there was some gaping of the infratemporal and supranasal   sclerotomies and these were closed with 7-0 Vicryl.  The eye was again rinsed   with 50% Betadine and then this was rinsed off.  Subconjunctival Kefzol and   Ancef were injected, lidocaine was instilled.  The pressure was approximately   20 by digital estimation at the end of the case.       ____________________________________     SHANEKA MD MARINE MONTENEGRO / LATRICE    DD:  02/18/2019 14:24:40  DT:  02/18/2019 14:51:11    D#:  [id number]  Job#:  [id number]

## 2019-02-18 NOTE — DISCHARGE INSTRUCTIONS
ACTIVITY: Rest and take it easy for the first 24 hours.  A responsible adult is recommended to remain with you during that time.  It is normal to feel sleepy.  We encourage you to not do anything that requires balance, judgment or coordination.    MILD FLU-LIKE SYMPTOMS ARE NORMAL. YOU MAY EXPERIENCE GENERALIZED MUSCLE ACHES, THROAT IRRITATION, HEADACHE AND/OR SOME NAUSEA.    FOR 24 HOURS DO NOT:  Drive, operate machinery or run household appliances.  Drink beer or alcoholic beverages.   Make important decisions or sign legal documents.    SPECIAL INSTRUCTIONS: No showering until after follow up tomorrow; leave eye patch in place    DIET: To avoid nausea, slowly advance diet as tolerated, avoiding spicy or greasy foods for the first day.  Add more substantial food to your diet according to your physician's instructions.  Babies can be fed formula or breast milk as soon as they are hungry.  INCREASE FLUIDS AND FIBER TO AVOID CONSTIPATION.    FOLLOW-UP APPOINTMENT:  A follow-up appointment should be arranged with your doctor in tomorrow.    You should CALL YOUR PHYSICIAN if you develop:  Fever greater than 101 degrees F.  Pain not relieved by medication, or persistent nausea or vomiting.  Excessive bleeding (blood soaking through dressing) or unexpected drainage from the wound.  Extreme redness or swelling around the incision site, drainage of pus or foul smelling drainage.  Inability to urinate or empty your bladder within 8 hours.  Problems with breathing or chest pain.    You should call 911 if you develop problems with breathing or chest pain.  If you are unable to contact your doctor or surgical center, you should go to the nearest emergency room or urgent care center.  Physician's telephone #: Dr. Keane [phone]    If any questions arise, call your doctor.  If your doctor is not available, please feel free to call the Surgical Center at [phone].  The Center is open Monday through Friday from 7AM to  7PM.  You can also call the HEALTH HOTLINE open 24 hours/day, 7 days/week and speak to a nurse at [phone], or toll free at [phone].    A registered nurse may call you a few days after your surgery to see how you are doing after your procedure.    MEDICATIONS: Resume taking daily medication.  Take prescribed pain medication with food.  If no medication is prescribed, you may take non-aspirin pain medication if needed.  PAIN MEDICATION CAN BE VERY CONSTIPATING.  Take a stool softener or laxative such as senokot, pericolace, or milk of magnesia if needed.    Prescription given for none.  Last pain medication given at none given at hospital.    If your physician has prescribed pain medication that includes Acetaminophen (Tylenol), do not take additional Acetaminophen (Tylenol) while taking the prescribed medication.    Depression / Suicide Risk    As you are discharged from this [facility] facility, it is important to learn how to keep safe from harming yourself.    Recognize the warning signs:  · Abrupt changes in personality, positive or negative- including increase in energy   · Giving away possessions  · Change in eating patterns- significant weight changes-  positive or negative  · Change in sleeping patterns- unable to sleep or sleeping all the time   · Unwillingness or inability to communicate  · Depression  · Unusual sadness, discouragement and loneliness  · Talk of wanting to die  · Neglect of personal appearance   · Rebelliousness- reckless behavior  · Withdrawal from people/activities they love  · Confusion- inability to concentrate     If you or a loved one observes any of these behaviors or has concerns about self-harm, here's what you can do:  · Talk about it- your feelings and reasons for harming yourself  · Remove any means that you might use to hurt yourself (examples: pills, rope, extension cords, firearm)  · Get professional help from the community (Mental Health, Substance Abuse,  psychological counseling)  · Do not be alone:Call your Safe Contact- someone whom you trust who will be there for you.  · Call your local CRISIS HOTLINE 112-7573 or 301-296-2668  · Call your local Children's Mobile Crisis Response Team [place] (030) 173-1330 or www.[employer]  · Call the toll free National Suicide Prevention Hotlines   · National Suicide Prevention Lifeline 077-968-VPBW (0377)  · National Hope Line Network 800-SUICIDE (754-8230)

## 2019-02-18 NOTE — OR NURSING
1409 to pacu awake report from dr issa and or rn   Pt denies pain and resting comfortable patch to left eye cdi taped in place  1500 ready for dc to home   1515 reviewed all dc instructions with pt and wife with verbal understanding iv dcd declines wheelchair ambulates out of unit accomp with wife with a steady gait with all belongings

## 2019-04-01 ENCOUNTER — HOSPITAL ENCOUNTER (OUTPATIENT)
Dept: RADIOLOGY | Facility: MEDICAL CENTER | Age: 69
End: 2019-04-01
Attending: UROLOGY
Payer: MEDICARE

## 2019-04-01 DIAGNOSIS — R97.20 ELEVATED PROSTATE SPECIFIC ANTIGEN (PSA): ICD-10-CM

## 2019-04-01 PROCEDURE — 700117 HCHG RX CONTRAST REV CODE 255: Performed by: UROLOGY

## 2019-04-01 PROCEDURE — A9585 GADOBUTROL INJECTION: HCPCS | Performed by: UROLOGY

## 2019-04-01 PROCEDURE — 72197 MRI PELVIS W/O & W/DYE: CPT

## 2019-04-01 PROCEDURE — 700111 HCHG RX REV CODE 636 W/ 250 OVERRIDE (IP): Performed by: RADIOLOGY

## 2019-04-01 RX ORDER — GADOBUTROL 604.72 MG/ML
10 INJECTION INTRAVENOUS ONCE
Status: COMPLETED | OUTPATIENT
Start: 2019-04-01 | End: 2019-04-01

## 2019-04-01 RX ADMIN — GLUCAGON HYDROCHLORIDE 1 MG: KIT at 13:00

## 2019-04-01 RX ADMIN — GADOBUTROL 10 ML: 604.72 INJECTION INTRAVENOUS at 13:59

## 2019-07-30 ENCOUNTER — HOSPITAL ENCOUNTER (OUTPATIENT)
Dept: LAB | Facility: MEDICAL CENTER | Age: 69
End: 2019-07-30
Attending: FAMILY MEDICINE
Payer: MEDICARE

## 2019-07-30 ENCOUNTER — HOSPITAL ENCOUNTER (OUTPATIENT)
Dept: LAB | Facility: MEDICAL CENTER | Age: 69
End: 2019-07-30
Attending: UROLOGY
Payer: MEDICARE

## 2019-07-30 LAB
ALBUMIN SERPL BCP-MCNC: 4 G/DL (ref 3.2–4.9)
ALBUMIN/GLOB SERPL: 1.8 G/DL
ALP SERPL-CCNC: 76 U/L (ref 30–99)
ALT SERPL-CCNC: 20 U/L (ref 2–50)
ANION GAP SERPL CALC-SCNC: 5 MMOL/L (ref 0–11.9)
APPEARANCE UR: CLEAR
AST SERPL-CCNC: 17 U/L (ref 12–45)
BACTERIA #/AREA URNS HPF: NEGATIVE /HPF
BASOPHILS # BLD AUTO: 1 % (ref 0–1.8)
BASOPHILS # BLD: 0.05 K/UL (ref 0–0.12)
BILIRUB SERPL-MCNC: 0.9 MG/DL (ref 0.1–1.5)
BILIRUB UR QL STRIP.AUTO: NEGATIVE
BUN SERPL-MCNC: 23 MG/DL (ref 8–22)
CALCIUM SERPL-MCNC: 8.9 MG/DL (ref 8.5–10.5)
CHLORIDE SERPL-SCNC: 109 MMOL/L (ref 96–112)
CHOLEST SERPL-MCNC: 142 MG/DL (ref 100–199)
CK SERPL-CCNC: 81 U/L (ref 0–154)
CO2 SERPL-SCNC: 27 MMOL/L (ref 20–33)
COLOR UR: YELLOW
CREAT SERPL-MCNC: 0.78 MG/DL (ref 0.5–1.4)
CRP SERPL HS-MCNC: 0.07 MG/DL (ref 0–0.75)
EOSINOPHIL # BLD AUTO: 0.09 K/UL (ref 0–0.51)
EOSINOPHIL NFR BLD: 1.8 % (ref 0–6.9)
EPI CELLS #/AREA URNS HPF: NEGATIVE /HPF
ERYTHROCYTE [DISTWIDTH] IN BLOOD BY AUTOMATED COUNT: 45.9 FL (ref 35.9–50)
EST. AVERAGE GLUCOSE BLD GHB EST-MCNC: 108 MG/DL
FASTING STATUS PATIENT QL REPORTED: NORMAL
GLOBULIN SER CALC-MCNC: 2.2 G/DL (ref 1.9–3.5)
GLUCOSE SERPL-MCNC: 104 MG/DL (ref 65–99)
GLUCOSE UR STRIP.AUTO-MCNC: NEGATIVE MG/DL
HBA1C MFR BLD: 5.4 % (ref 0–5.6)
HCT VFR BLD AUTO: 40.6 % (ref 42–52)
HDLC SERPL-MCNC: 50 MG/DL
HGB BLD-MCNC: 13.5 G/DL (ref 14–18)
HYALINE CASTS #/AREA URNS LPF: ABNORMAL /LPF
IMM GRANULOCYTES # BLD AUTO: 0.02 K/UL (ref 0–0.11)
IMM GRANULOCYTES NFR BLD AUTO: 0.4 % (ref 0–0.9)
KETONES UR STRIP.AUTO-MCNC: NEGATIVE MG/DL
LDLC SERPL CALC-MCNC: 66 MG/DL
LEUKOCYTE ESTERASE UR QL STRIP.AUTO: ABNORMAL
LYMPHOCYTES # BLD AUTO: 1.16 K/UL (ref 1–4.8)
LYMPHOCYTES NFR BLD: 23.3 % (ref 22–41)
MCH RBC QN AUTO: 33.2 PG (ref 27–33)
MCHC RBC AUTO-ENTMCNC: 33.3 G/DL (ref 33.7–35.3)
MCV RBC AUTO: 99.8 FL (ref 81.4–97.8)
MICRO URNS: ABNORMAL
MONOCYTES # BLD AUTO: 0.41 K/UL (ref 0–0.85)
MONOCYTES NFR BLD AUTO: 8.2 % (ref 0–13.4)
NEUTROPHILS # BLD AUTO: 3.24 K/UL (ref 1.82–7.42)
NEUTROPHILS NFR BLD: 65.3 % (ref 44–72)
NITRITE UR QL STRIP.AUTO: NEGATIVE
NRBC # BLD AUTO: 0 K/UL
NRBC BLD-RTO: 0 /100 WBC
PH UR STRIP.AUTO: 5.5 [PH] (ref 5–8)
PLATELET # BLD AUTO: 235 K/UL (ref 164–446)
PMV BLD AUTO: 9.4 FL (ref 9–12.9)
POTASSIUM SERPL-SCNC: 4.4 MMOL/L (ref 3.6–5.5)
PROT SERPL-MCNC: 6.2 G/DL (ref 6–8.2)
PROT UR QL STRIP: NEGATIVE MG/DL
PSA SERPL-MCNC: 7.55 NG/ML (ref 0–4)
PSA SERPL-MCNC: 7.87 NG/ML (ref 0–4)
RBC # BLD AUTO: 4.07 M/UL (ref 4.7–6.1)
RBC # URNS HPF: ABNORMAL /HPF
RBC UR QL AUTO: NEGATIVE
SODIUM SERPL-SCNC: 141 MMOL/L (ref 135–145)
SP GR UR STRIP.AUTO: 1.03
TRIGL SERPL-MCNC: 130 MG/DL (ref 0–149)
TSH SERPL DL<=0.005 MIU/L-ACNC: 1.26 UIU/ML (ref 0.38–5.33)
UROBILINOGEN UR STRIP.AUTO-MCNC: 0.2 MG/DL
WBC # BLD AUTO: 5 K/UL (ref 4.8–10.8)
WBC #/AREA URNS HPF: ABNORMAL /HPF

## 2019-07-30 PROCEDURE — 81001 URINALYSIS AUTO W/SCOPE: CPT

## 2019-07-30 PROCEDURE — 36415 COLL VENOUS BLD VENIPUNCTURE: CPT

## 2019-07-30 PROCEDURE — 84443 ASSAY THYROID STIM HORMONE: CPT

## 2019-07-30 PROCEDURE — 82550 ASSAY OF CK (CPK): CPT

## 2019-07-30 PROCEDURE — 80053 COMPREHEN METABOLIC PANEL: CPT

## 2019-07-30 PROCEDURE — 83036 HEMOGLOBIN GLYCOSYLATED A1C: CPT

## 2019-07-30 PROCEDURE — 86140 C-REACTIVE PROTEIN: CPT

## 2019-07-30 PROCEDURE — 84153 ASSAY OF PSA TOTAL: CPT | Mod: 91

## 2019-07-30 PROCEDURE — 85025 COMPLETE CBC W/AUTO DIFF WBC: CPT

## 2019-07-30 PROCEDURE — 84153 ASSAY OF PSA TOTAL: CPT

## 2019-07-30 PROCEDURE — 80061 LIPID PANEL: CPT

## 2020-01-13 ENCOUNTER — HOSPITAL ENCOUNTER (OUTPATIENT)
Dept: LAB | Facility: MEDICAL CENTER | Age: 70
End: 2020-01-13
Attending: INTERNAL MEDICINE
Payer: MEDICARE

## 2020-01-13 PROCEDURE — 82746 ASSAY OF FOLIC ACID SERUM: CPT

## 2020-01-13 PROCEDURE — 82607 VITAMIN B-12: CPT

## 2020-01-13 PROCEDURE — 36415 COLL VENOUS BLD VENIPUNCTURE: CPT

## 2020-01-13 PROCEDURE — 82728 ASSAY OF FERRITIN: CPT

## 2020-01-14 LAB
FERRITIN SERPL-MCNC: 697.4 NG/ML (ref 22–322)
FOLATE SERPL-MCNC: 11.9 NG/ML
VIT B12 SERPL-MCNC: 344 PG/ML (ref 211–911)

## 2020-02-14 ENCOUNTER — HOSPITAL ENCOUNTER (OUTPATIENT)
Dept: LAB | Facility: MEDICAL CENTER | Age: 70
End: 2020-02-14
Attending: UROLOGY
Payer: MEDICARE

## 2020-02-14 LAB — PSA SERPL-MCNC: 14.97 NG/ML (ref 0–4)

## 2020-02-14 PROCEDURE — 84153 ASSAY OF PSA TOTAL: CPT

## 2020-02-14 PROCEDURE — 36415 COLL VENOUS BLD VENIPUNCTURE: CPT

## 2020-08-11 ENCOUNTER — HOSPITAL ENCOUNTER (OUTPATIENT)
Dept: LAB | Facility: MEDICAL CENTER | Age: 70
End: 2020-08-11
Attending: PHYSICIAN ASSISTANT
Payer: MEDICARE

## 2020-08-11 PROCEDURE — 84154 ASSAY OF PSA FREE: CPT

## 2020-08-11 PROCEDURE — 84153 ASSAY OF PSA TOTAL: CPT

## 2020-08-13 LAB
PSA FREE MFR SERPL: 17 %
PSA FREE SERPL-MCNC: 2.4 NG/ML
PSA SERPL-MCNC: 13.8 NG/ML (ref 0–4)

## 2021-02-08 ENCOUNTER — HOSPITAL ENCOUNTER (OUTPATIENT)
Facility: MEDICAL CENTER | Age: 71
End: 2021-02-08
Attending: UROLOGY
Payer: MEDICARE

## 2021-02-08 PROCEDURE — 84154 ASSAY OF PSA FREE: CPT

## 2021-02-08 PROCEDURE — 84153 ASSAY OF PSA TOTAL: CPT

## 2021-02-11 LAB
PSA FREE MFR SERPL: 18 %
PSA FREE SERPL-MCNC: 2.5 NG/ML
PSA SERPL-MCNC: 13.7 NG/ML (ref 0–4)

## 2021-03-03 DIAGNOSIS — Z23 NEED FOR VACCINATION: ICD-10-CM

## 2021-03-16 ENCOUNTER — HOSPITAL ENCOUNTER (OUTPATIENT)
Dept: LAB | Facility: MEDICAL CENTER | Age: 71
End: 2021-03-16
Attending: FAMILY MEDICINE
Payer: MEDICARE

## 2021-03-16 LAB
AMORPH CRY #/AREA URNS HPF: PRESENT /HPF
APPEARANCE UR: CLEAR
BACTERIA #/AREA URNS HPF: NEGATIVE /HPF
BASOPHILS # BLD AUTO: 0.9 % (ref 0–1.8)
BASOPHILS # BLD: 0.05 K/UL (ref 0–0.12)
BILIRUB UR QL STRIP.AUTO: NEGATIVE
CAOX CRY #/AREA URNS HPF: ABNORMAL /HPF
COLOR UR: YELLOW
EOSINOPHIL # BLD AUTO: 0.11 K/UL (ref 0–0.51)
EOSINOPHIL NFR BLD: 2.1 % (ref 0–6.9)
EPI CELLS #/AREA URNS HPF: NEGATIVE /HPF
ERYTHROCYTE [DISTWIDTH] IN BLOOD BY AUTOMATED COUNT: 45.1 FL (ref 35.9–50)
EST. AVERAGE GLUCOSE BLD GHB EST-MCNC: 120 MG/DL
GLUCOSE UR STRIP.AUTO-MCNC: NEGATIVE MG/DL
HBA1C MFR BLD: 5.8 % (ref 4–5.6)
HCT VFR BLD AUTO: 42.9 % (ref 42–52)
HGB BLD-MCNC: 14.7 G/DL (ref 14–18)
HYALINE CASTS #/AREA URNS LPF: ABNORMAL /LPF
IMM GRANULOCYTES # BLD AUTO: 0.01 K/UL (ref 0–0.11)
IMM GRANULOCYTES NFR BLD AUTO: 0.2 % (ref 0–0.9)
KETONES UR STRIP.AUTO-MCNC: NEGATIVE MG/DL
LEUKOCYTE ESTERASE UR QL STRIP.AUTO: ABNORMAL
LYMPHOCYTES # BLD AUTO: 1.04 K/UL (ref 1–4.8)
LYMPHOCYTES NFR BLD: 19.7 % (ref 22–41)
MCH RBC QN AUTO: 34.3 PG (ref 27–33)
MCHC RBC AUTO-ENTMCNC: 34.3 G/DL (ref 33.7–35.3)
MCV RBC AUTO: 100 FL (ref 81.4–97.8)
MICRO URNS: ABNORMAL
MONOCYTES # BLD AUTO: 0.5 K/UL (ref 0–0.85)
MONOCYTES NFR BLD AUTO: 9.5 % (ref 0–13.4)
MUCOUS THREADS #/AREA URNS HPF: ABNORMAL /HPF
NEUTROPHILS # BLD AUTO: 3.58 K/UL (ref 1.82–7.42)
NEUTROPHILS NFR BLD: 67.6 % (ref 44–72)
NITRITE UR QL STRIP.AUTO: NEGATIVE
NRBC # BLD AUTO: 0 K/UL
NRBC BLD-RTO: 0 /100 WBC
PH UR STRIP.AUTO: 5 [PH] (ref 5–8)
PLATELET # BLD AUTO: 236 K/UL (ref 164–446)
PMV BLD AUTO: 9.8 FL (ref 9–12.9)
PROT UR QL STRIP: NEGATIVE MG/DL
PSA SERPL-MCNC: 10.3 NG/ML (ref 0–4)
RBC # BLD AUTO: 4.29 M/UL (ref 4.7–6.1)
RBC # URNS HPF: ABNORMAL /HPF
RBC UR QL AUTO: NEGATIVE
SP GR UR STRIP.AUTO: 1.04
TSH SERPL DL<=0.005 MIU/L-ACNC: 2.11 UIU/ML (ref 0.38–5.33)
UROBILINOGEN UR STRIP.AUTO-MCNC: 0.2 MG/DL
WBC # BLD AUTO: 5.3 K/UL (ref 4.8–10.8)
WBC #/AREA URNS HPF: ABNORMAL /HPF

## 2021-03-16 PROCEDURE — 36415 COLL VENOUS BLD VENIPUNCTURE: CPT

## 2021-03-16 PROCEDURE — 81001 URINALYSIS AUTO W/SCOPE: CPT

## 2021-03-16 PROCEDURE — 80053 COMPREHEN METABOLIC PANEL: CPT

## 2021-03-16 PROCEDURE — 84443 ASSAY THYROID STIM HORMONE: CPT

## 2021-03-16 PROCEDURE — 82550 ASSAY OF CK (CPK): CPT

## 2021-03-16 PROCEDURE — 83036 HEMOGLOBIN GLYCOSYLATED A1C: CPT

## 2021-03-16 PROCEDURE — 86140 C-REACTIVE PROTEIN: CPT

## 2021-03-16 PROCEDURE — 84153 ASSAY OF PSA TOTAL: CPT

## 2021-03-16 PROCEDURE — 85025 COMPLETE CBC W/AUTO DIFF WBC: CPT

## 2021-03-16 PROCEDURE — 80061 LIPID PANEL: CPT

## 2021-03-17 LAB
ALBUMIN SERPL BCP-MCNC: 4.3 G/DL (ref 3.2–4.9)
ALBUMIN/GLOB SERPL: 1.7 G/DL
ALP SERPL-CCNC: 20 U/L (ref 30–99)
ALT SERPL-CCNC: 23 U/L (ref 2–50)
ANION GAP SERPL CALC-SCNC: 7 MMOL/L (ref 7–16)
AST SERPL-CCNC: 16 U/L (ref 12–45)
BILIRUB SERPL-MCNC: 0.7 MG/DL (ref 0.1–1.5)
BUN SERPL-MCNC: 21 MG/DL (ref 8–22)
CALCIUM SERPL-MCNC: 9.5 MG/DL (ref 8.5–10.5)
CHLORIDE SERPL-SCNC: 108 MMOL/L (ref 96–112)
CHOLEST SERPL-MCNC: 137 MG/DL (ref 100–199)
CK SERPL-CCNC: 59 U/L (ref 0–154)
CO2 SERPL-SCNC: 25 MMOL/L (ref 20–33)
CREAT SERPL-MCNC: 0.65 MG/DL (ref 0.5–1.4)
CRP SERPL HS-MCNC: 0.17 MG/DL (ref 0–0.75)
GLOBULIN SER CALC-MCNC: 2.5 G/DL (ref 1.9–3.5)
GLUCOSE SERPL-MCNC: 100 MG/DL (ref 65–99)
HDLC SERPL-MCNC: 51 MG/DL
LDLC SERPL CALC-MCNC: 71 MG/DL
POTASSIUM SERPL-SCNC: 4.3 MMOL/L (ref 3.6–5.5)
PROT SERPL-MCNC: 6.8 G/DL (ref 6–8.2)
SODIUM SERPL-SCNC: 140 MMOL/L (ref 135–145)
TRIGL SERPL-MCNC: 75 MG/DL (ref 0–149)

## 2021-08-24 ENCOUNTER — PATIENT MESSAGE (OUTPATIENT)
Dept: HEALTH INFORMATION MANAGEMENT | Facility: OTHER | Age: 71
End: 2021-08-24

## 2021-09-20 ENCOUNTER — TELEPHONE (OUTPATIENT)
Dept: HEALTH INFORMATION MANAGEMENT | Facility: OTHER | Age: 71
End: 2021-09-20

## 2021-10-05 ENCOUNTER — HOSPITAL ENCOUNTER (OUTPATIENT)
Dept: RADIOLOGY | Facility: MEDICAL CENTER | Age: 71
End: 2021-10-05
Attending: FAMILY MEDICINE
Payer: MEDICARE

## 2021-10-05 DIAGNOSIS — R31.9 HEMATURIA SYNDROME: ICD-10-CM

## 2021-10-05 PROCEDURE — 74018 RADEX ABDOMEN 1 VIEW: CPT

## 2021-10-21 ENCOUNTER — HOSPITAL ENCOUNTER (OUTPATIENT)
Dept: LAB | Facility: MEDICAL CENTER | Age: 71
End: 2021-10-21
Attending: FAMILY MEDICINE
Payer: MEDICARE

## 2021-10-21 LAB
ANION GAP SERPL CALC-SCNC: 10 MMOL/L (ref 7–16)
BUN SERPL-MCNC: 19 MG/DL (ref 8–22)
CALCIUM SERPL-MCNC: 9.1 MG/DL (ref 8.5–10.5)
CHLORIDE SERPL-SCNC: 108 MMOL/L (ref 96–112)
CO2 SERPL-SCNC: 22 MMOL/L (ref 20–33)
CREAT SERPL-MCNC: 0.8 MG/DL (ref 0.5–1.4)
FASTING STATUS PATIENT QL REPORTED: NORMAL
GLUCOSE SERPL-MCNC: 104 MG/DL (ref 65–99)
POTASSIUM SERPL-SCNC: 4.2 MMOL/L (ref 3.6–5.5)
SODIUM SERPL-SCNC: 140 MMOL/L (ref 135–145)

## 2021-10-21 PROCEDURE — 80048 BASIC METABOLIC PNL TOTAL CA: CPT

## 2021-10-21 PROCEDURE — 36415 COLL VENOUS BLD VENIPUNCTURE: CPT

## 2021-10-26 ENCOUNTER — HOSPITAL ENCOUNTER (OUTPATIENT)
Dept: RADIOLOGY | Facility: MEDICAL CENTER | Age: 71
End: 2021-10-26
Attending: FAMILY MEDICINE
Payer: MEDICARE

## 2021-10-26 DIAGNOSIS — K82.8 CALCIFICATION OF GALLBLADDER: ICD-10-CM

## 2021-10-26 PROCEDURE — 700117 HCHG RX CONTRAST REV CODE 255: Performed by: FAMILY MEDICINE

## 2021-10-26 PROCEDURE — 74160 CT ABDOMEN W/CONTRAST: CPT | Mod: MH

## 2021-10-26 RX ADMIN — IOHEXOL 100 ML: 350 INJECTION, SOLUTION INTRAVENOUS at 10:33

## 2022-04-11 ENCOUNTER — HOSPITAL ENCOUNTER (OUTPATIENT)
Dept: RADIOLOGY | Facility: MEDICAL CENTER | Age: 72
End: 2022-04-11
Attending: FAMILY MEDICINE
Payer: MEDICARE

## 2022-04-11 DIAGNOSIS — M15.9 GENERALIZED OSTEOARTHROSIS, INVOLVING MULTIPLE SITES: ICD-10-CM

## 2022-04-11 PROCEDURE — 73502 X-RAY EXAM HIP UNI 2-3 VIEWS: CPT | Mod: LT

## 2022-06-06 ENCOUNTER — HOSPITAL ENCOUNTER (OUTPATIENT)
Dept: LAB | Facility: MEDICAL CENTER | Age: 72
End: 2022-06-06
Attending: FAMILY MEDICINE
Payer: MEDICARE

## 2022-06-06 LAB
ALBUMIN SERPL BCP-MCNC: 4.2 G/DL (ref 3.2–4.9)
ALBUMIN/GLOB SERPL: 1.7 G/DL
ALP SERPL-CCNC: 123 U/L (ref 30–99)
ALT SERPL-CCNC: 26 U/L (ref 2–50)
ANION GAP SERPL CALC-SCNC: 11 MMOL/L (ref 7–16)
APPEARANCE UR: CLEAR
AST SERPL-CCNC: 22 U/L (ref 12–45)
BACTERIA #/AREA URNS HPF: NEGATIVE /HPF
BASOPHILS # BLD AUTO: 1.2 % (ref 0–1.8)
BASOPHILS # BLD: 0.07 K/UL (ref 0–0.12)
BILIRUB SERPL-MCNC: 0.5 MG/DL (ref 0.1–1.5)
BILIRUB UR QL STRIP.AUTO: NEGATIVE
BUN SERPL-MCNC: 19 MG/DL (ref 8–22)
CALCIUM SERPL-MCNC: 8.9 MG/DL (ref 8.5–10.5)
CAOX CRY #/AREA URNS HPF: ABNORMAL /HPF
CHLORIDE SERPL-SCNC: 106 MMOL/L (ref 96–112)
CHOLEST SERPL-MCNC: 159 MG/DL (ref 100–199)
CO2 SERPL-SCNC: 23 MMOL/L (ref 20–33)
COLOR UR: YELLOW
CREAT SERPL-MCNC: 0.64 MG/DL (ref 0.5–1.4)
EOSINOPHIL # BLD AUTO: 0.2 K/UL (ref 0–0.51)
EOSINOPHIL NFR BLD: 3.4 % (ref 0–6.9)
EPI CELLS #/AREA URNS HPF: NEGATIVE /HPF
ERYTHROCYTE [DISTWIDTH] IN BLOOD BY AUTOMATED COUNT: 44.9 FL (ref 35.9–50)
EST. AVERAGE GLUCOSE BLD GHB EST-MCNC: 108 MG/DL
FASTING STATUS PATIENT QL REPORTED: NORMAL
GFR SERPLBLD CREATININE-BSD FMLA CKD-EPI: 102 ML/MIN/1.73 M 2
GLOBULIN SER CALC-MCNC: 2.5 G/DL (ref 1.9–3.5)
GLUCOSE SERPL-MCNC: 104 MG/DL (ref 65–99)
GLUCOSE UR STRIP.AUTO-MCNC: NEGATIVE MG/DL
HBA1C MFR BLD: 5.4 % (ref 4–5.6)
HCT VFR BLD AUTO: 40.6 % (ref 42–52)
HDLC SERPL-MCNC: 54 MG/DL
HGB BLD-MCNC: 14.2 G/DL (ref 14–18)
HYALINE CASTS #/AREA URNS LPF: ABNORMAL /LPF
IMM GRANULOCYTES # BLD AUTO: 0.02 K/UL (ref 0–0.11)
IMM GRANULOCYTES NFR BLD AUTO: 0.3 % (ref 0–0.9)
KETONES UR STRIP.AUTO-MCNC: NEGATIVE MG/DL
LDLC SERPL CALC-MCNC: 87 MG/DL
LEUKOCYTE ESTERASE UR QL STRIP.AUTO: ABNORMAL
LYMPHOCYTES # BLD AUTO: 1.12 K/UL (ref 1–4.8)
LYMPHOCYTES NFR BLD: 19.3 % (ref 22–41)
MCH RBC QN AUTO: 34.5 PG (ref 27–33)
MCHC RBC AUTO-ENTMCNC: 35 G/DL (ref 33.7–35.3)
MCV RBC AUTO: 98.8 FL (ref 81.4–97.8)
MICRO URNS: ABNORMAL
MONOCYTES # BLD AUTO: 0.48 K/UL (ref 0–0.85)
MONOCYTES NFR BLD AUTO: 8.3 % (ref 0–13.4)
NEUTROPHILS # BLD AUTO: 3.91 K/UL (ref 1.82–7.42)
NEUTROPHILS NFR BLD: 67.5 % (ref 44–72)
NITRITE UR QL STRIP.AUTO: NEGATIVE
NRBC # BLD AUTO: 0 K/UL
NRBC BLD-RTO: 0 /100 WBC
PH UR STRIP.AUTO: 5.5 [PH] (ref 5–8)
PLATELET # BLD AUTO: 246 K/UL (ref 164–446)
PMV BLD AUTO: 9.2 FL (ref 9–12.9)
POTASSIUM SERPL-SCNC: 4 MMOL/L (ref 3.6–5.5)
PROT SERPL-MCNC: 6.7 G/DL (ref 6–8.2)
PROT UR QL STRIP: NEGATIVE MG/DL
PSA SERPL-MCNC: 11 NG/ML (ref 0–4)
RBC # BLD AUTO: 4.11 M/UL (ref 4.7–6.1)
RBC # URNS HPF: ABNORMAL /HPF
RBC UR QL AUTO: NEGATIVE
SODIUM SERPL-SCNC: 140 MMOL/L (ref 135–145)
SP GR UR STRIP.AUTO: 1.03
TRIGL SERPL-MCNC: 90 MG/DL (ref 0–149)
TSH SERPL DL<=0.005 MIU/L-ACNC: 1.85 UIU/ML (ref 0.38–5.33)
UROBILINOGEN UR STRIP.AUTO-MCNC: 0.2 MG/DL
WBC # BLD AUTO: 5.8 K/UL (ref 4.8–10.8)
WBC #/AREA URNS HPF: ABNORMAL /HPF

## 2022-06-06 PROCEDURE — 84153 ASSAY OF PSA TOTAL: CPT

## 2022-06-06 PROCEDURE — 81539 ONCOLOGY PROSTATE PROB SCORE: CPT

## 2022-06-06 PROCEDURE — 80053 COMPREHEN METABOLIC PANEL: CPT

## 2022-06-06 PROCEDURE — 85025 COMPLETE CBC W/AUTO DIFF WBC: CPT

## 2022-06-06 PROCEDURE — 81001 URINALYSIS AUTO W/SCOPE: CPT

## 2022-06-06 PROCEDURE — 80061 LIPID PANEL: CPT

## 2022-06-06 PROCEDURE — 36415 COLL VENOUS BLD VENIPUNCTURE: CPT

## 2022-06-06 PROCEDURE — 84443 ASSAY THYROID STIM HORMONE: CPT

## 2022-06-06 PROCEDURE — 83036 HEMOGLOBIN GLYCOSYLATED A1C: CPT

## 2022-06-10 LAB
4K - PSA, FREE Q5895: 2.29 NG/ML
4K - PSA, TOTAL Q5894: 13.13 NG/ML
4K - SCORE Q5892: 21 %
4K -PSA, PERCENT FREE Q5896: 17 %

## 2022-10-13 ENCOUNTER — DOCUMENTATION (OUTPATIENT)
Dept: HEALTH INFORMATION MANAGEMENT | Facility: OTHER | Age: 72
End: 2022-10-13
Payer: MEDICARE

## 2022-11-13 ENCOUNTER — OFFICE VISIT (OUTPATIENT)
Dept: URGENT CARE | Facility: PHYSICIAN GROUP | Age: 72
End: 2022-11-13
Payer: MEDICARE

## 2022-11-13 ENCOUNTER — HOSPITAL ENCOUNTER (OUTPATIENT)
Facility: MEDICAL CENTER | Age: 72
End: 2022-11-13
Attending: NURSE PRACTITIONER
Payer: MEDICARE

## 2022-11-13 VITALS
HEART RATE: 74 BPM | BODY MASS INDEX: 30.31 KG/M2 | RESPIRATION RATE: 16 BRPM | WEIGHT: 200 LBS | DIASTOLIC BLOOD PRESSURE: 80 MMHG | TEMPERATURE: 97.6 F | SYSTOLIC BLOOD PRESSURE: 130 MMHG | HEIGHT: 68 IN | OXYGEN SATURATION: 97 %

## 2022-11-13 DIAGNOSIS — Z20.822 SUSPECTED COVID-19 VIRUS INFECTION: ICD-10-CM

## 2022-11-13 PROBLEM — R68.89 ABNORMAL CLINICAL FINDING: Status: ACTIVE | Noted: 2018-05-01

## 2022-11-13 PROBLEM — I10 HYPERTENSIVE DISORDER: Status: ACTIVE | Noted: 2022-11-13

## 2022-11-13 PROBLEM — R97.20 RAISED PROSTATE SPECIFIC ANTIGEN: Status: ACTIVE | Noted: 2018-05-01

## 2022-11-13 PROBLEM — N13.9 OBSTRUCTIVE UROPATHY: Status: ACTIVE | Noted: 2018-05-01

## 2022-11-13 PROCEDURE — 99202 OFFICE O/P NEW SF 15 MIN: CPT | Mod: CS | Performed by: NURSE PRACTITIONER

## 2022-11-13 PROCEDURE — 0240U HCHG SARS-COV-2 COVID-19 NFCT DS RESP RNA 3 TRGT MIC: CPT

## 2022-11-13 ASSESSMENT — ENCOUNTER SYMPTOMS
COUGH: 0
FEVER: 0
SORE THROAT: 0
CHILLS: 0

## 2022-11-13 ASSESSMENT — FIBROSIS 4 INDEX: FIB4 SCORE: 1.23

## 2022-11-14 LAB
AMBIGUOUS DTTM AMBI4: NORMAL
FLUAV RNA SPEC QL NAA+PROBE: NEGATIVE
FLUBV RNA SPEC QL NAA+PROBE: NEGATIVE
SARS-COV-2 RNA RESP QL NAA+PROBE: DETECTED
SPECIMEN SOURCE: ABNORMAL

## 2022-11-14 NOTE — PROGRESS NOTES
"Subjective:     Luis Angel Sanchez is a 72 y.o. male who presents for Cough (Pt asymptomatic and wanted to get tested because of wife. )      Cough  Pertinent negatives include no chills, fever or sore throat.   Pt presents for evaluation of a new problem.  Luis Angel is a 72-year-old male who presents to urgent care today with a desire for COVID testing.  He denies any current symptoms at this time however, his wife is in the clinic ill with COVID symptoms.  He denies fever, chills, nausea, vomiting, cough, congestion or sore throat.  He is vaccinated with 1 booster of COVID.  Review of Systems   Constitutional:  Negative for chills and fever.   HENT:  Negative for congestion and sore throat.    Respiratory:  Negative for cough.      PMH:   Past Medical History:   Diagnosis Date    Cancer (HCC) 10/24/2017    \"lesion on back\"    Hypertension      ALLERGIES:   Allergies   Allergen Reactions    Morphine Nausea     SURGHX:   Past Surgical History:   Procedure Laterality Date    VITRECTOMY POSTERIOR Left 2/18/2019    Procedure: VITRECTOMY POSTERIOR-  MEMBRANE STRIPPING, BBG, POSSIBLE AIR FLUID GAS, INTRAUVITREAL TRIESENCE, 23 GAUGE AND ANY OTHR INDICATED PROCEDURES;  Surgeon: Be Keane M.D.;  Location: SURGERY SAME DAY [facility];  Service: Ophthalmology    NODE BIOPSY SENTINEL Bilateral 1/11/2018    Procedure: NODE BIOPSY SENTINEL - AXILLARY;  Surgeon: Phil Mcmahan M.D.;  Location: SURGERY SAME DAY [facility];  Service: General    WIDE EXCISION Right 10/27/2017    Procedure: WIDE EXCISION- RADICAL MALIGNANT MELANOMA BACK;  Surgeon: Phil Mcmahan M.D.;  Location: SURGERY [facility];  Service: General    NODE DISSECTION Right 10/27/2017    Procedure: EXCISION DYSPLASTIC NEVUS LOWER BACK;  Surgeon: Phil Mcmahan M.D.;  Location: SURGERY [facility];  Service: General    OTHER  2012    right ear lesion removed    HERNIA REPAIR  1998    OTHER ABDOMINAL SURGERY  1996    colostomy take down    OTHER " "ABDOMINAL SURGERY      large intestine-colostomy     SOCHX:   Social History     Socioeconomic History    Marital status:    Tobacco Use    Smoking status: Former     Types: Cigarettes     Quit date: 1995     Years since quittin.8    Smokeless tobacco: Never   Substance and Sexual Activity    Alcohol use: Yes     Comment: 2 per week     Drug use: No     FH: History reviewed. No pertinent family history.      Objective:   /80 (BP Location: Left arm, Patient Position: Sitting, BP Cuff Size: Adult)   Pulse 74   Temp 36.4 °C (97.6 °F) (Temporal)   Resp 16   Ht 1.727 m (5' 8\")   Wt 90.7 kg (200 lb)   SpO2 97%   BMI 30.41 kg/m²     Physical Exam  Vitals and nursing note reviewed.   Constitutional:       General: He is not in acute distress.     Appearance: Normal appearance. He is normal weight. He is not ill-appearing or toxic-appearing.   HENT:      Head: Normocephalic.      Right Ear: External ear normal.      Left Ear: External ear normal.      Nose: Nose normal.      Mouth/Throat:      Mouth: Mucous membranes are moist.   Eyes:      General:         Right eye: No discharge.         Left eye: No discharge.      Extraocular Movements: Extraocular movements intact.      Conjunctiva/sclera: Conjunctivae normal.      Pupils: Pupils are equal, round, and reactive to light.   Pulmonary:      Effort: Pulmonary effort is normal.      Breath sounds: Normal breath sounds.   Abdominal:      General: Abdomen is flat.   Musculoskeletal:         General: Normal range of motion.      Cervical back: Normal range of motion and neck supple. No rigidity.   Lymphadenopathy:      Cervical: No cervical adenopathy.   Skin:     General: Skin is warm and dry.   Neurological:      General: No focal deficit present.      Mental Status: He is alert and oriented to person, place, and time. Mental status is at baseline.   Psychiatric:         Mood and Affect: Mood normal.         Behavior: Behavior normal.         " Judgment: Judgment normal.       Assessment/Plan:   Assessment    1. Suspected COVID-19 virus infection  CoV-2 and Flu A/B by PCR (24 hour In-House): Collect NP swab in [facility]        PCR testing in place.  I will notify of results.  AVS handout given and reviewed with patient. Pt educated on red flags and when to seek treatment back in ER or UC.

## 2022-12-09 ENCOUNTER — HOSPITAL ENCOUNTER (INPATIENT)
Facility: MEDICAL CENTER | Age: 72
LOS: 2 days | DRG: 378 | End: 2022-12-11
Attending: EMERGENCY MEDICINE | Admitting: HOSPITALIST
Payer: MEDICARE

## 2022-12-09 ENCOUNTER — APPOINTMENT (OUTPATIENT)
Dept: RADIOLOGY | Facility: MEDICAL CENTER | Age: 72
DRG: 378 | End: 2022-12-09
Attending: EMERGENCY MEDICINE
Payer: MEDICARE

## 2022-12-09 DIAGNOSIS — K92.1 GASTROINTESTINAL HEMORRHAGE WITH MELENA: ICD-10-CM

## 2022-12-09 DIAGNOSIS — I10 PRIMARY HYPERTENSION: ICD-10-CM

## 2022-12-09 DIAGNOSIS — R97.20 RAISED PROSTATE SPECIFIC ANTIGEN: ICD-10-CM

## 2022-12-09 DIAGNOSIS — R55 SYNCOPE, UNSPECIFIED SYNCOPE TYPE: ICD-10-CM

## 2022-12-09 PROBLEM — K92.2 GI BLEED: Status: ACTIVE | Noted: 2022-12-09

## 2022-12-09 PROBLEM — K92.2 GI BLEEDING: Status: ACTIVE | Noted: 2022-12-09

## 2022-12-09 LAB
ABO + RH BLD: NORMAL
ABO GROUP BLD: NORMAL
ALBUMIN SERPL BCP-MCNC: 3.8 G/DL (ref 3.2–4.9)
ALBUMIN/GLOB SERPL: 1.7 G/DL
ALP SERPL-CCNC: 104 U/L (ref 30–99)
ALT SERPL-CCNC: 30 U/L (ref 2–50)
ANION GAP SERPL CALC-SCNC: 13 MMOL/L (ref 7–16)
APTT PPP: 24.4 SEC (ref 24.7–36)
AST SERPL-CCNC: 20 U/L (ref 12–45)
BASOPHILS # BLD AUTO: 0.6 % (ref 0–1.8)
BASOPHILS # BLD: 0.07 K/UL (ref 0–0.12)
BILIRUB SERPL-MCNC: 0.4 MG/DL (ref 0.1–1.5)
BLD GP AB SCN SERPL QL: NORMAL
BUN SERPL-MCNC: 32 MG/DL (ref 8–22)
CALCIUM SERPL-MCNC: 8.3 MG/DL (ref 8.4–10.2)
CFT BLD TEG: 5.2 MIN (ref 4.6–9.1)
CFT P HPASE BLD TEG: 4.8 MIN (ref 4.3–8.3)
CHLORIDE SERPL-SCNC: 107 MMOL/L (ref 96–112)
CLOT ANGLE BLD TEG: 74.1 DEGREES (ref 63–78)
CLOT LYSIS 30M P MA LENFR BLD TEG: 0 % (ref 0–2.6)
CO2 SERPL-SCNC: 19 MMOL/L (ref 20–33)
CREAT SERPL-MCNC: 0.75 MG/DL (ref 0.5–1.4)
CT.EXTRINSIC BLD ROTEM: 1.2 MIN (ref 0.8–2.1)
EKG IMPRESSION: NORMAL
EOSINOPHIL # BLD AUTO: 0.13 K/UL (ref 0–0.51)
EOSINOPHIL NFR BLD: 1.2 % (ref 0–6.9)
ERYTHROCYTE [DISTWIDTH] IN BLOOD BY AUTOMATED COUNT: 45.7 FL (ref 35.9–50)
ERYTHROCYTE [DISTWIDTH] IN BLOOD BY AUTOMATED COUNT: 46.2 FL (ref 35.9–50)
GFR SERPLBLD CREATININE-BSD FMLA CKD-EPI: 97 ML/MIN/1.73 M 2
GLOBULIN SER CALC-MCNC: 2.2 G/DL (ref 1.9–3.5)
GLUCOSE SERPL-MCNC: 155 MG/DL (ref 65–99)
HCT VFR BLD AUTO: 35.4 % (ref 42–52)
HCT VFR BLD AUTO: 36.9 % (ref 42–52)
HGB BLD-MCNC: 12.2 G/DL (ref 14–18)
HGB BLD-MCNC: 12.7 G/DL (ref 14–18)
IMM GRANULOCYTES # BLD AUTO: 0.04 K/UL (ref 0–0.11)
IMM GRANULOCYTES NFR BLD AUTO: 0.4 % (ref 0–0.9)
INR PPP: 1.04 (ref 0.87–1.13)
LIPASE SERPL-CCNC: 27 U/L (ref 7–58)
LYMPHOCYTES # BLD AUTO: 1.08 K/UL (ref 1–4.8)
LYMPHOCYTES NFR BLD: 9.6 % (ref 22–41)
MCF BLD TEG: 57.9 MM (ref 52–69)
MCF.PLATELET INHIB BLD ROTEM: 19.1 MM (ref 15–32)
MCH RBC QN AUTO: 34.1 PG (ref 27–33)
MCH RBC QN AUTO: 34.3 PG (ref 27–33)
MCHC RBC AUTO-ENTMCNC: 34.4 G/DL (ref 33.7–35.3)
MCHC RBC AUTO-ENTMCNC: 34.5 G/DL (ref 33.7–35.3)
MCV RBC AUTO: 99.2 FL (ref 81.4–97.8)
MCV RBC AUTO: 99.4 FL (ref 81.4–97.8)
MONOCYTES # BLD AUTO: 0.67 K/UL (ref 0–0.85)
MONOCYTES NFR BLD AUTO: 6 % (ref 0–13.4)
NEUTROPHILS # BLD AUTO: 9.24 K/UL (ref 1.82–7.42)
NEUTROPHILS NFR BLD: 82.2 % (ref 44–72)
NRBC # BLD AUTO: 0 K/UL
NRBC BLD-RTO: 0 /100 WBC
PA AA BLD-ACNC: 11.7 % (ref 0–11)
PA ADP BLD-ACNC: 18.5 % (ref 0–17)
PLATELET # BLD AUTO: 208 K/UL (ref 164–446)
PLATELET # BLD AUTO: 223 K/UL (ref 164–446)
PMV BLD AUTO: 9 FL (ref 9–12.9)
PMV BLD AUTO: 9.3 FL (ref 9–12.9)
POTASSIUM SERPL-SCNC: 3.4 MMOL/L (ref 3.6–5.5)
PROT SERPL-MCNC: 6 G/DL (ref 6–8.2)
PROTHROMBIN TIME: 13.5 SEC (ref 12–14.6)
RBC # BLD AUTO: 3.56 M/UL (ref 4.7–6.1)
RBC # BLD AUTO: 3.72 M/UL (ref 4.7–6.1)
RH BLD: NORMAL
SODIUM SERPL-SCNC: 139 MMOL/L (ref 135–145)
TEG ALGORITHM TGALG: ABNORMAL
WBC # BLD AUTO: 11.2 K/UL (ref 4.8–10.8)
WBC # BLD AUTO: 15.7 K/UL (ref 4.8–10.8)

## 2022-12-09 PROCEDURE — 86850 RBC ANTIBODY SCREEN: CPT

## 2022-12-09 PROCEDURE — 700111 HCHG RX REV CODE 636 W/ 250 OVERRIDE (IP): Performed by: HOSPITALIST

## 2022-12-09 PROCEDURE — 86901 BLOOD TYPING SEROLOGIC RH(D): CPT

## 2022-12-09 PROCEDURE — 700105 HCHG RX REV CODE 258: Performed by: HOSPITALIST

## 2022-12-09 PROCEDURE — 36415 COLL VENOUS BLD VENIPUNCTURE: CPT

## 2022-12-09 PROCEDURE — 85027 COMPLETE CBC AUTOMATED: CPT

## 2022-12-09 PROCEDURE — 700117 HCHG RX CONTRAST REV CODE 255: Performed by: EMERGENCY MEDICINE

## 2022-12-09 PROCEDURE — 80053 COMPREHEN METABOLIC PANEL: CPT

## 2022-12-09 PROCEDURE — 85610 PROTHROMBIN TIME: CPT

## 2022-12-09 PROCEDURE — 99223 1ST HOSP IP/OBS HIGH 75: CPT | Performed by: HOSPITALIST

## 2022-12-09 PROCEDURE — 83690 ASSAY OF LIPASE: CPT

## 2022-12-09 PROCEDURE — 96374 THER/PROPH/DIAG INJ IV PUSH: CPT

## 2022-12-09 PROCEDURE — 770020 HCHG ROOM/CARE - TELE (206)

## 2022-12-09 PROCEDURE — 86900 BLOOD TYPING SEROLOGIC ABO: CPT

## 2022-12-09 PROCEDURE — 93005 ELECTROCARDIOGRAM TRACING: CPT

## 2022-12-09 PROCEDURE — 85025 COMPLETE CBC W/AUTO DIFF WBC: CPT

## 2022-12-09 PROCEDURE — C9113 INJ PANTOPRAZOLE SODIUM, VIA: HCPCS | Performed by: HOSPITALIST

## 2022-12-09 PROCEDURE — 99285 EMERGENCY DEPT VISIT HI MDM: CPT

## 2022-12-09 PROCEDURE — 85576 BLOOD PLATELET AGGREGATION: CPT

## 2022-12-09 PROCEDURE — 85347 COAGULATION TIME ACTIVATED: CPT

## 2022-12-09 PROCEDURE — 74174 CTA ABD&PLVS W/CONTRAST: CPT

## 2022-12-09 PROCEDURE — 85730 THROMBOPLASTIN TIME PARTIAL: CPT

## 2022-12-09 PROCEDURE — 85384 FIBRINOGEN ACTIVITY: CPT

## 2022-12-09 RX ORDER — ONDANSETRON 4 MG/1
4 TABLET, ORALLY DISINTEGRATING ORAL EVERY 4 HOURS PRN
Status: DISCONTINUED | OUTPATIENT
Start: 2022-12-09 | End: 2022-12-11 | Stop reason: HOSPADM

## 2022-12-09 RX ORDER — AMOXICILLIN 250 MG
2 CAPSULE ORAL 2 TIMES DAILY
Status: DISCONTINUED | OUTPATIENT
Start: 2022-12-09 | End: 2022-12-11 | Stop reason: HOSPADM

## 2022-12-09 RX ORDER — POLYETHYLENE GLYCOL 3350 17 G/17G
1 POWDER, FOR SOLUTION ORAL
Status: DISCONTINUED | OUTPATIENT
Start: 2022-12-09 | End: 2022-12-11 | Stop reason: HOSPADM

## 2022-12-09 RX ORDER — ACETAMINOPHEN 325 MG/1
650 TABLET ORAL EVERY 6 HOURS PRN
Status: DISCONTINUED | OUTPATIENT
Start: 2022-12-09 | End: 2022-12-11 | Stop reason: HOSPADM

## 2022-12-09 RX ORDER — BISACODYL 10 MG
10 SUPPOSITORY, RECTAL RECTAL
Status: DISCONTINUED | OUTPATIENT
Start: 2022-12-09 | End: 2022-12-11 | Stop reason: HOSPADM

## 2022-12-09 RX ORDER — ONDANSETRON 2 MG/ML
4 INJECTION INTRAMUSCULAR; INTRAVENOUS EVERY 4 HOURS PRN
Status: DISCONTINUED | OUTPATIENT
Start: 2022-12-09 | End: 2022-12-11 | Stop reason: HOSPADM

## 2022-12-09 RX ORDER — SODIUM CHLORIDE 9 MG/ML
1000 INJECTION, SOLUTION INTRAVENOUS CONTINUOUS
Status: DISCONTINUED | OUTPATIENT
Start: 2022-12-09 | End: 2022-12-11 | Stop reason: HOSPADM

## 2022-12-09 RX ORDER — VALSARTAN 80 MG/1
160 TABLET ORAL DAILY
Status: DISCONTINUED | OUTPATIENT
Start: 2022-12-10 | End: 2022-12-11 | Stop reason: HOSPADM

## 2022-12-09 RX ORDER — PANTOPRAZOLE SODIUM 40 MG/10ML
40 INJECTION, POWDER, LYOPHILIZED, FOR SOLUTION INTRAVENOUS 2 TIMES DAILY
Status: DISCONTINUED | OUTPATIENT
Start: 2022-12-09 | End: 2022-12-11 | Stop reason: HOSPADM

## 2022-12-09 RX ADMIN — SODIUM CHLORIDE 1000 ML: 9 INJECTION, SOLUTION INTRAVENOUS at 21:33

## 2022-12-09 RX ADMIN — SODIUM CHLORIDE 1000 ML: 9 INJECTION, SOLUTION INTRAVENOUS at 19:15

## 2022-12-09 RX ADMIN — PANTOPRAZOLE SODIUM 40 MG: 40 INJECTION, POWDER, LYOPHILIZED, FOR SOLUTION INTRAVENOUS at 19:22

## 2022-12-09 RX ADMIN — IOHEXOL 100 ML: 350 INJECTION, SOLUTION INTRAVENOUS at 18:56

## 2022-12-09 ASSESSMENT — LIFESTYLE VARIABLES
TOTAL SCORE: 0
ALCOHOL_USE: YES
TOTAL SCORE: 0
CONSUMPTION TOTAL: NEGATIVE
HOW MANY TIMES IN THE PAST YEAR HAVE YOU HAD 5 OR MORE DRINKS IN A DAY: 0
HAVE PEOPLE ANNOYED YOU BY CRITICIZING YOUR DRINKING: NO
AVERAGE NUMBER OF DAYS PER WEEK YOU HAVE A DRINK CONTAINING ALCOHOL: 2
HAVE YOU EVER FELT YOU SHOULD CUT DOWN ON YOUR DRINKING: NO
EVER HAD A DRINK FIRST THING IN THE MORNING TO STEADY YOUR NERVES TO GET RID OF A HANGOVER: NO
EVER FELT BAD OR GUILTY ABOUT YOUR DRINKING: NO
ON A TYPICAL DAY WHEN YOU DRINK ALCOHOL HOW MANY DRINKS DO YOU HAVE: 1
TOTAL SCORE: 0

## 2022-12-09 ASSESSMENT — ENCOUNTER SYMPTOMS
FEVER: 0
COUGH: 0
FLANK PAIN: 0
STRIDOR: 0
ABDOMINAL PAIN: 0
EYE DISCHARGE: 0
CHILLS: 0
FOCAL WEAKNESS: 0
VOMITING: 0
MYALGIAS: 0
EYE REDNESS: 0
SHORTNESS OF BREATH: 0
NERVOUS/ANXIOUS: 0
BLOOD IN STOOL: 1
BRUISES/BLEEDS EASILY: 0

## 2022-12-09 ASSESSMENT — COGNITIVE AND FUNCTIONAL STATUS - GENERAL
SUGGESTED CMS G CODE MODIFIER DAILY ACTIVITY: CH
MOBILITY SCORE: 24
DAILY ACTIVITIY SCORE: 24
SUGGESTED CMS G CODE MODIFIER MOBILITY: CH

## 2022-12-09 ASSESSMENT — PATIENT HEALTH QUESTIONNAIRE - PHQ9
2. FEELING DOWN, DEPRESSED, IRRITABLE, OR HOPELESS: NOT AT ALL
SUM OF ALL RESPONSES TO PHQ9 QUESTIONS 1 AND 2: 0
1. LITTLE INTEREST OR PLEASURE IN DOING THINGS: NOT AT ALL

## 2022-12-09 ASSESSMENT — FIBROSIS 4 INDEX: FIB4 SCORE: 1.23

## 2022-12-10 ENCOUNTER — APPOINTMENT (OUTPATIENT)
Dept: CARDIOLOGY | Facility: MEDICAL CENTER | Age: 72
DRG: 378 | End: 2022-12-10
Attending: HOSPITALIST
Payer: MEDICARE

## 2022-12-10 LAB
ALBUMIN SERPL BCP-MCNC: 3.3 G/DL (ref 3.2–4.9)
ALBUMIN/GLOB SERPL: 1.7 G/DL
ALP SERPL-CCNC: 87 U/L (ref 30–99)
ALT SERPL-CCNC: 25 U/L (ref 2–50)
ANION GAP SERPL CALC-SCNC: 9 MMOL/L (ref 7–16)
APPEARANCE UR: CLEAR
AST SERPL-CCNC: 17 U/L (ref 12–45)
BILIRUB SERPL-MCNC: 0.5 MG/DL (ref 0.1–1.5)
BILIRUB UR QL STRIP.AUTO: NEGATIVE
BUN SERPL-MCNC: 24 MG/DL (ref 8–22)
CALCIUM SERPL-MCNC: 7.9 MG/DL (ref 8.4–10.2)
CHLORIDE SERPL-SCNC: 112 MMOL/L (ref 96–112)
CO2 SERPL-SCNC: 20 MMOL/L (ref 20–33)
COLOR UR: YELLOW
CREAT SERPL-MCNC: 0.57 MG/DL (ref 0.5–1.4)
ERYTHROCYTE [DISTWIDTH] IN BLOOD BY AUTOMATED COUNT: 47.7 FL (ref 35.9–50)
GFR SERPLBLD CREATININE-BSD FMLA CKD-EPI: 105 ML/MIN/1.73 M 2
GLOBULIN SER CALC-MCNC: 2 G/DL (ref 1.9–3.5)
GLUCOSE SERPL-MCNC: 105 MG/DL (ref 65–99)
GLUCOSE UR STRIP.AUTO-MCNC: NEGATIVE MG/DL
HCT VFR BLD AUTO: 32.4 % (ref 42–52)
HGB BLD-MCNC: 11 G/DL (ref 14–18)
HGB BLD-MCNC: 11.5 G/DL (ref 14–18)
HGB BLD-MCNC: 11.6 G/DL (ref 14–18)
KETONES UR STRIP.AUTO-MCNC: NEGATIVE MG/DL
LEUKOCYTE ESTERASE UR QL STRIP.AUTO: NEGATIVE
LV EJECT FRACT  99904: 65
LV EJECT FRACT MOD 2C 99903: 82.44
LV EJECT FRACT MOD 4C 99902: 48.16
LV EJECT FRACT MOD BP 99901: 71.55
MAGNESIUM SERPL-MCNC: 1.9 MG/DL (ref 1.5–2.5)
MCH RBC QN AUTO: 34.5 PG (ref 27–33)
MCHC RBC AUTO-ENTMCNC: 34 G/DL (ref 33.7–35.3)
MCV RBC AUTO: 101.6 FL (ref 81.4–97.8)
MICRO URNS: ABNORMAL
MUCOUS THREADS #/AREA URNS HPF: ABNORMAL /HPF
NITRITE UR QL STRIP.AUTO: NEGATIVE
PH UR STRIP.AUTO: 6 [PH] (ref 5–8)
PLATELET # BLD AUTO: 178 K/UL (ref 164–446)
PMV BLD AUTO: 9.2 FL (ref 9–12.9)
POTASSIUM SERPL-SCNC: 3.7 MMOL/L (ref 3.6–5.5)
PROT SERPL-MCNC: 5.3 G/DL (ref 6–8.2)
PROT UR QL STRIP: NEGATIVE MG/DL
RBC # BLD AUTO: 3.19 M/UL (ref 4.7–6.1)
RBC # URNS HPF: ABNORMAL /HPF
RBC UR QL AUTO: ABNORMAL
SODIUM SERPL-SCNC: 141 MMOL/L (ref 135–145)
SP GR UR STRIP.AUTO: 1.01
WBC # BLD AUTO: 7.7 K/UL (ref 4.8–10.8)
WBC #/AREA URNS HPF: ABNORMAL /HPF

## 2022-12-10 PROCEDURE — 83735 ASSAY OF MAGNESIUM: CPT

## 2022-12-10 PROCEDURE — C9113 INJ PANTOPRAZOLE SODIUM, VIA: HCPCS | Performed by: HOSPITALIST

## 2022-12-10 PROCEDURE — 700111 HCHG RX REV CODE 636 W/ 250 OVERRIDE (IP): Performed by: HOSPITALIST

## 2022-12-10 PROCEDURE — 770020 HCHG ROOM/CARE - TELE (206)

## 2022-12-10 PROCEDURE — 85027 COMPLETE CBC AUTOMATED: CPT

## 2022-12-10 PROCEDURE — 93306 TTE W/DOPPLER COMPLETE: CPT

## 2022-12-10 PROCEDURE — 93306 TTE W/DOPPLER COMPLETE: CPT | Mod: 26 | Performed by: INTERNAL MEDICINE

## 2022-12-10 PROCEDURE — 99232 SBSQ HOSP IP/OBS MODERATE 35: CPT | Performed by: INTERNAL MEDICINE

## 2022-12-10 PROCEDURE — 700105 HCHG RX REV CODE 258: Performed by: HOSPITALIST

## 2022-12-10 PROCEDURE — 81001 URINALYSIS AUTO W/SCOPE: CPT

## 2022-12-10 PROCEDURE — 80053 COMPREHEN METABOLIC PANEL: CPT

## 2022-12-10 PROCEDURE — 85018 HEMOGLOBIN: CPT

## 2022-12-10 PROCEDURE — 94760 N-INVAS EAR/PLS OXIMETRY 1: CPT

## 2022-12-10 PROCEDURE — 700102 HCHG RX REV CODE 250 W/ 637 OVERRIDE(OP): Performed by: NURSE PRACTITIONER

## 2022-12-10 PROCEDURE — A9270 NON-COVERED ITEM OR SERVICE: HCPCS | Performed by: NURSE PRACTITIONER

## 2022-12-10 PROCEDURE — 36415 COLL VENOUS BLD VENIPUNCTURE: CPT

## 2022-12-10 RX ORDER — PEG-3350, SODIUM SULFATE, SODIUM CHLORIDE, POTASSIUM CHLORIDE, SODIUM ASCORBATE AND ASCORBIC ACID 7.5-2.691G
100 KIT ORAL 2 TIMES DAILY
Status: COMPLETED | OUTPATIENT
Start: 2022-12-10 | End: 2022-12-11

## 2022-12-10 RX ADMIN — PANTOPRAZOLE SODIUM 40 MG: 40 INJECTION, POWDER, LYOPHILIZED, FOR SOLUTION INTRAVENOUS at 05:15

## 2022-12-10 RX ADMIN — SODIUM CHLORIDE 1000 ML: 9 INJECTION, SOLUTION INTRAVENOUS at 23:07

## 2022-12-10 RX ADMIN — PEG-3350, SODIUM SULFATE, SODIUM CHLORIDE, POTASSIUM CHLORIDE, SODIUM ASCORBATE AND ASCORBIC ACID 100 G: KIT at 16:00

## 2022-12-10 RX ADMIN — PANTOPRAZOLE SODIUM 40 MG: 40 INJECTION, POWDER, LYOPHILIZED, FOR SOLUTION INTRAVENOUS at 16:31

## 2022-12-10 ASSESSMENT — ENCOUNTER SYMPTOMS
BLOOD IN STOOL: 1
INSOMNIA: 0
ABDOMINAL PAIN: 0
POLYDIPSIA: 0
EYE PAIN: 0
NAUSEA: 0
HEMOPTYSIS: 0
PALPITATIONS: 0
SENSORY CHANGE: 0
CHILLS: 0
COUGH: 0
SPEECH CHANGE: 0
WEAKNESS: 1
EYE DISCHARGE: 0
FEVER: 0
VOMITING: 0
FALLS: 0
MEMORY LOSS: 0
FLANK PAIN: 0

## 2022-12-10 ASSESSMENT — FIBROSIS 4 INDEX: FIB4 SCORE: 1.44

## 2022-12-10 NOTE — CARE PLAN
The patient is Watcher - Medium risk of patient condition declining or worsening    Shift Goals  Clinical Goals: Strict NPO. GI consult. trend H/H  Patient Goals: rest  Family Goals: joanna    Progress made toward(s) clinical / shift goals:    Problem: Knowledge Deficit - Standard  Goal: Patient and family/care givers will demonstrate understanding of plan of care, disease process/condition, diagnostic tests and medications  Outcome: Progressing     Problem: Fall Risk  Goal: Patient will remain free from falls  Outcome: Progressing       Patient is not progressing towards the following goals:

## 2022-12-10 NOTE — CARE PLAN
The patient is Watcher - Medium risk of patient condition declining or worsening    Shift Goals  Clinical Goals: NPO midnight, q8h HH lab  Patient Goals: sleep  Family Goals: RANULFO    Progress made toward(s) clinical / shift goals:  Patient pain is managed and controlled, NPO at midnight, HH is watched with q8h draws, current HH is 12.2, POC discussed with pt, no further questions or concerns, call light within reach, bed alarm on, pt resting    Patient is not progressing towards the following goals:    Problem: Knowledge Deficit - Standard  Goal: Patient and family/care givers will demonstrate understanding of plan of care, disease process/condition, diagnostic tests and medications  Outcome: Progressing     Problem: Fall Risk  Goal: Patient will remain free from falls  Outcome: Progressing

## 2022-12-10 NOTE — ED NOTES
Patient resting in bed in a slight [place]. No complaints of pain at this time. No longer pale or diaphoretic.

## 2022-12-10 NOTE — HOSPITAL COURSE
"Per notes, \"72 y.o. male with a past medical history of primary hypertension who presented 12/9/2022 with transient loss of consciousness after having three bloody bowel movements.  Patient reports that he had three loose bloody bowel movements on the day of admission.  Feeling weak and dizzy and then suddenly passed out.  He denies noticing any chest pain shortness of breath focal motor weakness prior to passing out.  He denies hitting his head.  He denies having abdominal pain nausea or vomiting.\"  "

## 2022-12-10 NOTE — ED NOTES
In 2004 pt had a polyp perforated in his lower intestine. He had abdominal surgery and had a colostomy for approximately 9 months after surgery. No complication since.

## 2022-12-10 NOTE — PROGRESS NOTES
"Hospital Medicine Daily Progress Note    Date of Service  12/10/2022    Chief Complaint  Luis Angel Sanchez is a 72 y.o. male admitted 12/9/2022 with loss of consciousness, 3 bloody stools    Hospital Course  Per notes, \"72 y.o. male with a past medical history of primary hypertension who presented 12/9/2022 with transient loss of consciousness after having three bloody bowel movements.  Patient reports that he had three loose bloody bowel movements on the day of admission.  Feeling weak and dizzy and then suddenly passed out.  He denies noticing any chest pain shortness of breath focal motor weakness prior to passing out.  He denies hitting his head.  He denies having abdominal pain nausea or vomiting.\"    Interval Problem Update  No further episodes of bleeding or dizziness.  Discussed with GI and plan is for EGD and colonoscopy tomorrow on 12/11.    I have discussed this patient's plan of care and discharge plan at IDT rounds today with Case Management, Nursing, Nursing leadership, and other members of the IDT team.    Consultants/Specialty  GI    Code Status  Full Code    Disposition  Patient is not medically cleared for discharge.   Anticipate discharge to to home with close outpatient follow-up.  I have placed the appropriate orders for post-discharge needs.    Review of Systems  Review of Systems   Constitutional:  Positive for malaise/fatigue.   Gastrointestinal:  Positive for blood in stool.   All other systems reviewed and are negative.     Physical Exam  Temp:  [35.8 °C (96.5 °F)-36.9 °C (98.5 °F)] 36.9 °C (98.5 °F)  Pulse:  [71-87] 86  Resp:  [9-18] 18  BP: ()/(53-89) 142/89  SpO2:  [91 %-97 %] 96 %    Physical Exam  Vitals and nursing note reviewed.   Constitutional:       Appearance: Normal appearance. He is obese.   Cardiovascular:      Rate and Rhythm: Normal rate and regular rhythm.      Pulses: Normal pulses.      Heart sounds: Normal heart sounds.   Pulmonary:      Effort: Pulmonary effort is " normal.      Breath sounds: Normal breath sounds.   Abdominal:      General: Abdomen is flat. Bowel sounds are normal.      Palpations: Abdomen is soft.   Musculoskeletal:      Right lower leg: No edema.      Left lower leg: No edema.   Neurological:      General: No focal deficit present.      Mental Status: He is alert and oriented to person, place, and time. Mental status is at baseline.       Fluids    Intake/Output Summary (Last 24 hours) at 12/10/2022 1219  Last data filed at 12/10/2022 1123  Gross per 24 hour   Intake 75 ml   Output 775 ml   Net -700 ml       Laboratory  Recent Labs     12/09/22  1617 12/09/22  1830 12/10/22  0246 12/10/22  0907   WBC 11.2* 15.7* 7.7  --    RBC 3.72* 3.56* 3.19*  --    HEMOGLOBIN 12.7* 12.2* 11.0* 11.5*   HEMATOCRIT 36.9* 35.4* 32.4*  --    MCV 99.2* 99.4* 101.6*  --    MCH 34.1* 34.3* 34.5*  --    MCHC 34.4 34.5 34.0  --    RDW 45.7 46.2 47.7  --    PLATELETCT 223 208 178  --    MPV 9.3 9.0 9.2  --      Recent Labs     12/09/22  1617 12/10/22  0246   SODIUM 139 141   POTASSIUM 3.4* 3.7   CHLORIDE 107 112   CO2 19* 20   GLUCOSE 155* 105*   BUN 32* 24*   CREATININE 0.75 0.57   CALCIUM 8.3* 7.9*     Recent Labs     12/09/22  1617   APTT 24.4*   INR 1.04               Imaging  CTA ABDOMEN PELVIS W & W/O POST PROCESS   Final Result      1.  Colonic diverticulosis.   2.  No evidence for active GI bleed.   3.  Moderate to marked prostatomegaly.   4.  Large mildly complex right renal cyst again noted.   5.  Tiny hiatal hernia.   6.  No aortic aneurysm or dissection. Atherosclerosis.      EC-ECHOCARDIOGRAM COMPLETE W/O CONT    (Results Pending)        Assessment/Plan  * GI bleed- (present on admission)  Assessment & Plan  Insert to wide pore peripheral IV accesses    Intravenous fluids   H&H every 8 hours  Transfuse for Hb <7  GI consulted, planning on EGD and colonoscopy on 12/11  Clears    Syncope- (present on admission)  Assessment & Plan  Likely secondary to GI bleeding.  EKG  shows sinus rhythm with a rate of 72, there is no ST elevation, there is 1 mm ST depression in lead II, III and aVF, QTC is 438  Orthostatic vital  Telemetry monitor  Echocardiography pending  No symptoms here, but fall precautions    Primary hypertension- (present on admission)  Assessment & Plan  Resume home valsartan with hold parameters       VTE prophylaxis: SCDs/TEDs    I have performed a physical exam and reviewed and updated ROS and Plan today (12/10/2022). In review of yesterday's note (12/9/2022), there are no changes except as documented above.

## 2022-12-10 NOTE — ASSESSMENT & PLAN NOTE
Insert to wide pore peripheral IV accesses    Intravenous fluids   H&H every 8 hours  Transfuse for Hb <7  GI consulted, planning on EGD and colonoscopy on 12/11  Clears

## 2022-12-10 NOTE — ED PROVIDER NOTES
ED Provider Note    CHIEF COMPLAINT  Chief Complaint   Patient presents with    Bloody Stools     Pt had 3 episodes of bright red bloody diarrhea and a witness syncope episodes that prompted family to call EMS. In route BP was soft in the 100's SBP. Pt is experiencing nausea with no vomiting. He is pale and diaphoretic.        HPI  Luis Angel Sanchez is a 72 y.o. male who presents with 3 episodes of bloody stool today since about 2 PM.  Noted that it was obviously bloody and dark red.  Had a presyncopal event here in the hospital and was hypotensive.  No chest pain or trouble breathing.  Patient is currently alert and awake.  No abdominal pain, vomiting.  Denies chronic anticoagulation.  Denies prior history of GI bleed.    He notes that his last colonoscopy was within the past year, estimated about 8 months ago by Dr. Gomez.  Has prior history of colostomy with subsequent reversal over a decade ago along with umbilical hernia repair.    REVIEW OF SYSTEMS  See HPI for further details. All other systems are negative.     PAST MEDICAL HISTORY   has a past medical history of Cancer (HCC) (10/24/2017) and Hypertension.    SOCIAL HISTORY  Social History     Tobacco Use    Smoking status: Former     Types: Cigarettes     Quit date: 1995     Years since quittin.9    Smokeless tobacco: Never   Substance and Sexual Activity    Alcohol use: Yes     Comment: 2 per week     Drug use: No    Sexual activity: Not on file       SURGICAL HISTORY   has a past surgical history that includes other (); wide excision (Right, 10/27/2017); node dissection (Right, 10/27/2017); node biopsy sentinel (Bilateral, 2018); hernia repair (); other abdominal surgery (); other abdominal surgery (); and vitrectomy posterior (Left, 2019).    CURRENT MEDICATIONS  Home Medications       Reviewed by Gustavo Miranda (Pharmacy Tech) on 22 at 1648  Med List Status: Complete     Medication Last Dose Status  "  Ascorbic Acid (VITAMIN C PO) 2022 Active   meloxicam (MOBIC) 7.5 MG Tab 2022 Active   Multiple Vitamins-Minerals (ZINC PO) 2022 Active   NON SPECIFIED 2022 Active   valsartan (DIOVAN) 160 MG Tab 2022 Active   VITAMIN E PO 2022 Active                    ALLERGIES  Allergies   Allergen Reactions    Morphine Nausea       PHYSICAL EXAM  VITAL SIGNS: BP (!) 73/53   Pulse 80   Temp 35.8 °C (96.5 °F) (Temporal)   Resp (!) 9   Ht 1.727 m (5' 8\")   Wt 84.8 kg (187 lb)   SpO2 93%   BMI 28.43 kg/m²   Pulse ox interpretation: I interpret this pulse ox as normal.  Constitutional: Alert in no apparent distress.  HENT: No signs of trauma, Bilateral external ears normal, Nose normal.   Eyes: Pupils are equal and reactive, Conjunctiva normal, Non-icteric.   Neck: Normal range of motion, Supple, No stridor.   Cardiovascular: Regular rate and rhythm.   Thorax & Lungs: Normal breath sounds, No respiratory distress  Abdomen: Soft, No tenderness, No masses, No pulsatile masses. No peritoneal signs.  Skin: Warm, Dry, No erythema, No rash.   Extremities: Intact distal pulses, No edema, No cyanosis  Musculoskeletal: Good range of motion in all major joints. No major deformities noted.   Neurologic: Alert, No focal deficits noted.       DIAGNOSTIC STUDIES / PROCEDURES    EKG - Physician interpretation  Results for orders placed or performed during the hospital encounter of 22   EKG   Result Value Ref Range    Report       [facility] Emergency Dept.    Test Date:  2022  Pt Name:    BETTE ARSHAD                Department: [facility]  MRN:        [id number]                      Room:       3301  Gender:     Male                         Technician: [id number]  :        1950                   Requested By:ER TRIAGE PROTOCOL  Order #:    [id number]                    Reading MD: XAVIER STUART MD    Measurements  Intervals                                Axis  Rate:       72        "                    P:          -32  MT:         172                          QRS:        28  QRSD:       84                           T:          0  QT:         400  QTc:        438    Interpretive Statements  SINUS RHYTHM  EARLY PRECORDIAL R/S TRANSITION  Compared to ECG 02/12/2019 11:39:30  No significant changes  Electronically Signed On 12-9-2022 22:27:23 PST by XAVIER STUART MD         LABS  Labs Reviewed   CBC WITH DIFFERENTIAL - Abnormal; Notable for the following components:       Result Value    WBC 11.2 (*)     RBC 3.72 (*)     Hemoglobin 12.7 (*)     Hematocrit 36.9 (*)     MCV 99.2 (*)     MCH 34.1 (*)     Neutrophils-Polys 82.20 (*)     Lymphocytes 9.60 (*)     Neutrophils (Absolute) 9.24 (*)     All other components within normal limits   COMP METABOLIC PANEL - Abnormal; Notable for the following components:    Potassium 3.4 (*)     Co2 19 (*)     Glucose 155 (*)     Bun 32 (*)     Calcium 8.3 (*)     Alkaline Phosphatase 104 (*)     All other components within normal limits   APTT - Abnormal; Notable for the following components:    APTT 24.4 (*)     All other components within normal limits    Narrative:     Indicate which anticoagulants the patient is on:->NONE  Do you want to extend TEG graph to LY30? (If no, graph will  terminate at MA)->Yes   CBC WITHOUT DIFFERENTIAL - Abnormal; Notable for the following components:    WBC 15.7 (*)     RBC 3.56 (*)     Hemoglobin 12.2 (*)     Hematocrit 35.4 (*)     MCV 99.4 (*)     MCH 34.3 (*)     All other components within normal limits   PLATELET MAPPING WITH BASIC TEG - Abnormal; Notable for the following components:    % Inhibition ADP 18.5 (*)     % Inhibition AA 11.7 (*)     All other components within normal limits    Narrative:     Indicate which anticoagulants the patient is on:->NONE  Do you want to extend TEG graph to LY30? (If no, graph will  terminate at MA)->Yes   LIPASE   COD (ADULT)   PROTHROMBIN TIME    Narrative:     Indicate which  anticoagulants the patient is on:->NONE  Do you want to extend TEG graph to LY30? (If no, graph will  terminate at MA)->Yes   ESTIMATED GFR   ABO RH CONFIRM   URINALYSIS         RADIOLOGY  CTA ABDOMEN PELVIS W & W/O POST PROCESS   Final Result      1.  Colonic diverticulosis.   2.  No evidence for active GI bleed.   3.  Moderate to marked prostatomegaly.   4.  Large mildly complex right renal cyst again noted.   5.  Tiny hiatal hernia.   6.  No aortic aneurysm or dissection. Atherosclerosis.      EC-ECHOCARDIOGRAM COMPLETE W/O CONT    (Results Pending)         COURSE & MEDICAL DECISION MAKING    Medications   valsartan (DIOVAN) tablet 160 mg (has no administration in time range)   acetaminophen (Tylenol) tablet 650 mg (has no administration in time range)   senna-docusate (PERICOLACE or SENOKOT S) 8.6-50 MG per tablet 2 Tablet (2 Tablets Oral Refused 12/9/22 1915)     And   polyethylene glycol/lytes (MIRALAX) PACKET 1 Packet (has no administration in time range)     And   magnesium hydroxide (MILK OF MAGNESIA) suspension 30 mL (has no administration in time range)     And   bisacodyl (DULCOLAX) suppository 10 mg (has no administration in time range)   ondansetron (ZOFRAN) syringe/vial injection 4 mg (has no administration in time range)   ondansetron (ZOFRAN ODT) dispertab 4 mg (has no administration in time range)   pantoprazole (Protonix) injection 40 mg (40 mg Intravenous Given 12/9/22 1922)   NS infusion 1,000 mL (1,000 mL Intravenous New Bag 12/9/22 2133)   iohexol (OMNIPAQUE) 350 mg/mL (IV) (100 mL Intravenous Given 12/9/22 1856)       Pertinent Labs & Imaging studies reviewed. (See chart for details)  72 y.o. male presenting with obviously bloody stool x3 today.  Had a hypotensive episode upon arrival and near syncope.  No vomiting or abdominal pain.  No chest pain or trouble breathing.  No prior history of GI bleed in the past.  Has had a recent colonoscopy within the past year that was unremarkable.  Has  "had prior history of colostomy and reversal in the past.  Benign abdominal exam.  Patient's hemoglobin was 12 upon arrival.  2-hour repeat was stable at 12.  No chronic anticoagulation.  Suspect diverticular bleeding.    Spoke with Dr. Hernandez from GI.  Recommending CT angio of the abdomen and pelvis for further evaluation  to detect possible active bleeding.  CT angio was unremarkable.    Spoke with Dr. Vargas from the hospitalist service who is agreeable to the hospitalization.    The total critical care time on this patient is 35 minutes, resuscitating patient, speaking with admitting physician, and deciphering test results. This 35 minutes is exclusive of separately billable procedures.      /82   Pulse 71   Temp 35.8 °C (96.5 °F) (Temporal)   Resp (!) 10   Ht 1.727 m (5' 8\")   Wt 84.8 kg (187 lb)   SpO2 97%   BMI 28.43 kg/m²         FINAL IMPRESSION  Lower GI bleed      Electronically signed by: Tony Barry M.D., 12/9/2022 4:57 PM    "

## 2022-12-10 NOTE — H&P
Hospital Medicine History & Physical Note    Date of Service  12/9/2022    Primary Care Physician  Tony Casanova M.D.    Consultants  Gastroenterology, Dr. Antonio     Code Status  Full Code    Chief Complaint  Chief Complaint   Patient presents with    Bloody Stools     Pt had 3 episodes of bright red bloody diarrhea and a witness syncope episodes that prompted family to call EMS. In route BP was soft in the 100's SBP. Pt is experiencing nausea with no vomiting. He is pale and diaphoretic.      History of Presenting Illness  Luis Angel Sanchez is a 72 y.o. male with a past medical history of primary hypertension who presented 12/9/2022 with transient loss of consciousness after having three bloody bowel movements.  Patient reports that he had three loose bloody bowel movements on the day of admission.  Feeling weak and dizzy and then suddenly passed out.  He denies noticing any chest pain shortness of breath focal motor weakness prior to passing out.  He denies hitting his head.  He denies having abdominal pain nausea or vomiting.     I discussed the plan of care with emergency department physician, the patient and patient family present at bedside in the emergency room.    Review of Systems  Review of Systems   Constitutional:  Negative for chills and fever.   Eyes:  Negative for discharge and redness.   Respiratory:  Negative for cough, shortness of breath and stridor.    Cardiovascular:  Negative for chest pain and leg swelling.   Gastrointestinal:  Positive for blood in stool. Negative for abdominal pain and vomiting.   Genitourinary:  Negative for flank pain.   Musculoskeletal:  Negative for myalgias.   Skin: Negative.    Neurological:  Negative for focal weakness.   Endo/Heme/Allergies:  Does not bruise/bleed easily.   Psychiatric/Behavioral:  The patient is not nervous/anxious.      Past Medical History   has a past medical history of Cancer (HCC) (10/24/2017) and Hypertension.    Surgical History   has a past  surgical history that includes other (2012); wide excision (Right, 10/27/2017); node dissection (Right, 10/27/2017); node biopsy sentinel (Bilateral, 1/11/2018); hernia repair (1998); other abdominal surgery (1995); other abdominal surgery (1996); and vitrectomy posterior (Left, 2/18/2019).     Family History  No known history of heart disease in father or mother    Social History   reports that he quit smoking about 27 years ago. His smoking use included cigarettes. He has never used smokeless tobacco. He reports current alcohol use. He reports that he does not use drugs.    Allergies  Allergies   Allergen Reactions    Morphine Nausea     Medications  Prior to Admission Medications   Prescriptions Last Dose Informant Patient Reported? Taking?   Ascorbic Acid (VITAMIN C PO) 12/8/2022 at [facility] Patient Yes Yes   Sig: Take 1 Tablet by mouth every day.   Multiple Vitamins-Minerals (ZINC PO) 12/8/2022 at [facility] Patient Yes Yes   Sig: Take 1 Tablet by mouth every day.   NON SPECIFIED 12/9/2022 at AM Patient Yes Yes   Sig: Take 2 Tablets by mouth every day. PROSTAGENIX   VITAMIN E PO 12/8/2022 at [facility] Patient Yes Yes   Sig: Take 1 Tablet by mouth every day.   meloxicam (MOBIC) 7.5 MG Tab 12/9/2022 at AM Patient No No   Sig: Take 1 Tablet by mouth every day.   valsartan (DIOVAN) 160 MG Tab 12/9/2022 at AM Patient Yes No   Sig: Take 160 mg by mouth every day.      Facility-Administered Medications: None     Physical Exam  Temp:  [35.8 °C (96.5 °F)] 35.8 °C (96.5 °F)  Pulse:  [71-87] 71  Resp:  [9-18] 10  BP: ()/(53-77) 130/77  SpO2:  [91 %-97 %] 97 %  Blood Pressure : 130/77   Temperature: 35.8 °C (96.5 °F)   Pulse: 71   Respiration: (!) 10   Pulse Oximetry: 97 %     Physical Exam  Constitutional:       General: He is not in acute distress.     Appearance: He is not ill-appearing or diaphoretic.   HENT:      Head: Atraumatic.      Right Ear: External ear normal.      Left Ear: External ear normal.      Nose: No congestion or  rhinorrhea.      Mouth/Throat:      Mouth: Mucous membranes are moist.   Eyes:      General: No scleral icterus.        Right eye: No discharge.         Left eye: No discharge.      Pupils: Pupils are equal, round, and reactive to light.   Cardiovascular:      Rate and Rhythm: Normal rate and regular rhythm.   Pulmonary:      Effort: Pulmonary effort is normal.   Abdominal:      General: There is no distension.   Musculoskeletal:      Cervical back: Neck supple. No rigidity. No muscular tenderness.      Right lower leg: No edema.      Left lower leg: No edema.   Skin:     Coloration: Skin is not jaundiced or pale.   Neurological:      Mental Status: He is alert and oriented to person, place, and time.      Coordination: Coordination normal.   Psychiatric:         Mood and Affect: Mood normal.         Behavior: Behavior normal.     Laboratory:  Recent Labs     12/09/22  1617 12/09/22  1830   WBC 11.2* 15.7*   RBC 3.72* 3.56*   HEMOGLOBIN 12.7* 12.2*   HEMATOCRIT 36.9* 35.4*   MCV 99.2* 99.4*   MCH 34.1* 34.3*   MCHC 34.4 34.5   RDW 45.7 46.2   PLATELETCT 223 208   MPV 9.3 9.0     Recent Labs     12/09/22  1617   SODIUM 139   POTASSIUM 3.4*   CHLORIDE 107   CO2 19*   GLUCOSE 155*   BUN 32*   CREATININE 0.75   CALCIUM 8.3*     Recent Labs     12/09/22  1617   ALTSGPT 30   ASTSGOT 20   ALKPHOSPHAT 104*   TBILIRUBIN 0.4   LIPASE 27   GLUCOSE 155*     Recent Labs     12/09/22  1617   APTT 24.4*   INR 1.04     No results for input(s): NTPROBNP in the last 72 hours.      No results for input(s): TROPONINT in the last 72 hours.    Imaging:  CTA ABDOMEN PELVIS W & W/O POST PROCESS   Final Result      1.  Colonic diverticulosis.   2.  No evidence for active GI bleed.   3.  Moderate to marked prostatomegaly.   4.  Large mildly complex right renal cyst again noted.   5.  Tiny hiatal hernia.   6.  No aortic aneurysm or dissection. Atherosclerosis.      EC-ECHOCARDIOGRAM COMPLETE W/O CONT    (Results Pending)     I personally  reviewed patient EKG shows sinus rhythm with a rate of 72, there is no ST elevation, there is 1 mm ST depression in lead II, III and aVF, QTC is 438    Assessment/Plan:  Justification for Admission Status  I anticipate this patient will require at least two midnights for appropriate medical management, necessitating inpatient admission because patient has GI bleeding leading to syncope.  Will require GI consultation, monitoring of blood pressure and hemoglobin closely.  Will probably require colonoscopy    Patient will need a  bed on telemetry service, syncope    * GI bleed- (present on admission)  Assessment & Plan  Insert to wide pore peripheral IV accesses    Intravenous fluids   H&H every 8 hours  Transfuse for Hb <7  GI consulted, Dr. Hernandez will see  N.p.o. after midnight        Syncope- (present on admission)  Assessment & Plan  Likely secondary to GI bleeding.  EKG shows sinus rhythm with a rate of 72, there is no ST elevation, there is 1 mm ST depression in lead II, III and aVF, QTC is 438  Orthostatic vital  Telemetry monitor  Echocardiography     Primary hypertension- (present on admission)  Assessment & Plan  Resume home valsartan with hold parameters    VTE prophylaxis: SCDs/TEDs

## 2022-12-10 NOTE — CONSULTS
Gastroenterology Initial Consult Note               Author:  CORINNE Uriarte Date & Time Created: 12/10/2022 8:49 AM       Patient ID:  Name:             Luis Angel Sanchez    YOB: 1950  Age:                 72 y.o.  male  MRN:               [id number]      Referring Provider:  Carroll Vargas MD      Presenting Chief Complaint:  GI Bleed      History of Present Illness:    This is a very pleasant 72 y.o. male with the past medical history that includes hypertension, perforated colon and large bowel resection years ago.  The patient presented to the hospital yesterday after he developed 3-4 episodes of painless GI bleeding.  He states he was in his normal state of health and then felt like he urgently needed to have a bowel movement with diarrhea.  When he looked in the toilet bowl he noticed a large amount of red blood in the toilet.  He states this occurred 2 more times.  During these episodes he became weak and dizzy.  Ambulance was called.  He states in the ambulance he had a near syncopal event and then another near syncopal event in the emergency room as well.  Upon initial admission noted to have blood pressure in the low 100s experiencing nausea with no vomiting.  Initial hemoglobin 12.7 down to 11 today.  CTA negative for acute GI bleed.  EKG without evidence of STEMI.  Echocardiogram pending.  CMP with BUN 32, normal creatinine.    The patient last had an EGD and colonoscopy in 2020 with Dr. Perkins with findings of gastritis and peptic ulcer disease, diverticulosis, polyp in the colon.  Pathology negative for H. pylori or malignancy.  Polyp positive for tubular adenoma.      Review of Systems:  Review of Systems   Constitutional:  Positive for malaise/fatigue. Negative for chills and fever.   HENT:  Negative for congestion and nosebleeds.    Eyes:  Negative for pain and discharge.   Respiratory:  Negative for cough and hemoptysis.    Cardiovascular:  Negative for chest pain and  "palpitations.   Gastrointestinal:  Positive for blood in stool. Negative for abdominal pain, nausea and vomiting.   Genitourinary:  Negative for flank pain and hematuria.   Musculoskeletal:  Negative for falls and joint pain.   Skin:  Negative for itching and rash.   Neurological:  Positive for weakness. Negative for sensory change and speech change.   Endo/Heme/Allergies:  Negative for environmental allergies and polydipsia.   Psychiatric/Behavioral:  Negative for memory loss. The patient does not have insomnia.            Past Medical History:  Past Medical History:   Diagnosis Date    Cancer (HCC) 10/24/2017    \"lesion on back\"    Hypertension      Active Hospital Problems    Diagnosis     Primary hypertension [I10]     Syncope [R55]     GI bleed [K92.2]          Past Surgical History:  Past Surgical History:   Procedure Laterality Date    VITRECTOMY POSTERIOR Left 2/18/2019    Procedure: VITRECTOMY POSTERIOR-  MEMBRANE STRIPPING, BBG, POSSIBLE AIR FLUID GAS, INTRAUVITREAL TRIESENCE, 23 GAUGE AND ANY OTHR INDICATED PROCEDURES;  Surgeon: Be Keane M.D.;  Location: SURGERY SAME DAY [facility];  Service: Ophthalmology    NODE BIOPSY SENTINEL Bilateral 1/11/2018    Procedure: NODE BIOPSY SENTINEL - AXILLARY;  Surgeon: Phil Mcmahan M.D.;  Location: SURGERY SAME DAY [facility];  Service: General    WIDE EXCISION Right 10/27/2017    Procedure: WIDE EXCISION- RADICAL MALIGNANT MELANOMA BACK;  Surgeon: Phil Mcmahan M.D.;  Location: SURGERY [facility];  Service: General    NODE DISSECTION Right 10/27/2017    Procedure: EXCISION DYSPLASTIC NEVUS LOWER BACK;  Surgeon: Phil Mcmahan M.D.;  Location: SURGERY [facility];  Service: General    OTHER  2012    right ear lesion removed    HERNIA REPAIR  1998    OTHER ABDOMINAL SURGERY  1996    colostomy take down    OTHER ABDOMINAL SURGERY  1995    large intestine-colostomy           Hospital Medications:  Current Facility-Administered Medications "   Medication Dose Frequency Provider Last Rate Last Admin    peg-electrolyte solution (MOVIPREP) package 100 g  100 g BID CORINNE Uriarte        valsartan (DIOVAN) tablet 160 mg  160 mg DAILY Carroll Vargas M.D.        acetaminophen (Tylenol) tablet 650 mg  650 mg Q6HRS PRN Carroll Vargas M.D.        senna-docusate (PERICOLACE or SENOKOT S) 8.6-50 MG per tablet 2 Tablet  2 Tablet BID Carroll Vargas M.D.        And    polyethylene glycol/lytes (MIRALAX) PACKET 1 Packet  1 Packet QDAY PRN Carroll Vargas M.D.        And    magnesium hydroxide (MILK OF MAGNESIA) suspension 30 mL  30 mL QDAY PRN Carroll Vargas M.D.        And    bisacodyl (DULCOLAX) suppository 10 mg  10 mg QDAY PRN Carroll Vargas M.D.        ondansetron (ZOFRAN) syringe/vial injection 4 mg  4 mg Q4HRS PRN Carroll Vargas M.D.        ondansetron (ZOFRAN ODT) dispertab 4 mg  4 mg Q4HRS PRN Carroll Vargas M.D.        pantoprazole (Protonix) injection 40 mg  40 mg BID Carroll Vargas M.D.   40 mg at 12/10/22 0515    NS infusion 1,000 mL  1,000 mL Continuous Carroll Vargas M.D. 75 mL/hr at 12/09/22 2133 1,000 mL at 12/09/22 2133   Last reviewed on 12/9/2022  9:25 PM by Lakshmi Camarena R.N.       Current Outpatient Medications:  Medications Prior to Admission   Medication Sig Dispense Refill Last Dose    Ascorbic Acid (VITAMIN C PO) Take 1 Tablet by mouth every day.   12/8/2022 at [facility]    Multiple Vitamins-Minerals (ZINC PO) Take 1 Tablet by mouth every day.   12/8/2022 at [facility]    VITAMIN E PO Take 1 Tablet by mouth every day.   12/8/2022 at [facility]    NON SPECIFIED Take 2 Tablets by mouth every day. PROSTAGENIX   12/9/2022 at AM    valsartan (DIOVAN) 160 MG Tab Take 160 mg by mouth every day.   12/9/2022 at AM    meloxicam (MOBIC) 7.5 MG Tab Take 1 Tablet by mouth every day. 30 Tablet 6 12/9/2022 at AM         Medication Allergies:  Allergies   Allergen Reactions    Morphine Nausea         Family Medical History:  No family history on  "file.      Social History:  Social History     Socioeconomic History    Marital status:      Spouse name: Not on file    Number of children: Not on file    Years of education: Not on file    Highest education level: Not on file   Occupational History    Not on file   Tobacco Use    Smoking status: Former     Types: Cigarettes     Quit date: 1995     Years since quittin.9    Smokeless tobacco: Never   Substance and Sexual Activity    Alcohol use: Yes     Comment: 2 per week     Drug use: No    Sexual activity: Not on file   Other Topics Concern    Not on file   Social History Narrative    Not on file     Social Determinants of Health     Financial Resource Strain: Not on file   Food Insecurity: Not on file   Transportation Needs: Not on file   Physical Activity: Not on file   Stress: Not on file   Social Connections: Not on file   Intimate Partner Violence: Not on file   Housing Stability: Not on file         Vital signs:  Weight/BMI: Body mass index is 29.57 kg/m².  /79   Pulse 72   Temp 36.9 °C (98.5 °F) (Oral)   Resp 18   Ht 1.727 m (5' 8\")   Wt 88.2 kg (194 lb 7.1 oz)   SpO2 96%   Vitals:    12/10/22 0000 12/10/22 0500 12/10/22 0518 12/10/22 0710   BP: 126/88 (!) 140/82 124/76 136/79   Pulse: 77 72 73 72   Resp: 16 18  18   Temp: 36.8 °C (98.3 °F) 36.9 °C (98.4 °F)  36.9 °C (98.5 °F)   TempSrc: Oral Oral  Oral   SpO2: 93% 94%  96%   Weight:  88.2 kg (194 lb 7.1 oz)     Height:  1.727 m (5' 8\")       Oxygen Therapy:  Pulse Oximetry: 96 %, O2 (LPM): 0, O2 Delivery Device: None - Room Air    Intake/Output Summary (Last 24 hours) at 12/10/2022 0849  Last data filed at 12/10/2022 0711  Gross per 24 hour   Intake 75 ml   Output 575 ml   Net -500 ml         Physical Exam:  Physical Exam  Vitals and nursing note reviewed.   Constitutional:       Appearance: Normal appearance.   HENT:      Head: Normocephalic and atraumatic.      Right Ear: External ear normal.      Left Ear: External ear " normal.      Nose: Nose normal. No congestion.      Mouth/Throat:      Mouth: Mucous membranes are moist.      Pharynx: Oropharynx is clear.   Eyes:      General: No scleral icterus.     Extraocular Movements: Extraocular movements intact.      Pupils: Pupils are equal, round, and reactive to light.   Cardiovascular:      Rate and Rhythm: Normal rate and regular rhythm.      Pulses: Normal pulses.      Heart sounds: Normal heart sounds. No murmur heard.  Pulmonary:      Effort: Pulmonary effort is normal. No respiratory distress.      Breath sounds: Normal breath sounds.   Abdominal:      General: Abdomen is flat. Bowel sounds are normal. There is no distension.      Tenderness: There is no abdominal tenderness.   Musculoskeletal:      Cervical back: Neck supple.      Right lower leg: No edema.      Left lower leg: No edema.   Lymphadenopathy:      Cervical: No cervical adenopathy.   Skin:     General: Skin is warm and dry.   Neurological:      General: No focal deficit present.      Mental Status: He is alert and oriented to person, place, and time.   Psychiatric:         Thought Content: Thought content normal.         Judgment: Judgment normal.         Labs:  Recent Labs     12/09/22  1617 12/10/22  0246   SODIUM 139 141   POTASSIUM 3.4* 3.7   CHLORIDE 107 112   CO2 19* 20   BUN 32* 24*   CREATININE 0.75 0.57   MAGNESIUM  --  1.9   CALCIUM 8.3* 7.9*     Recent Labs     12/09/22  1617 12/10/22  0246   ALTSGPT 30 25   ASTSGOT 20 17   ALKPHOSPHAT 104* 87   TBILIRUBIN 0.4 0.5   LIPASE 27  --    GLUCOSE 155* 105*     Recent Labs     12/09/22 1617 12/09/22  1830 12/10/22  0246   WBC 11.2* 15.7* 7.7   NEUTSPOLYS 82.20*  --   --    LYMPHOCYTES 9.60*  --   --    MONOCYTES 6.00  --   --    EOSINOPHILS 1.20  --   --    BASOPHILS 0.60  --   --    ASTSGOT 20  --  17   ALTSGPT 30  --  25   ALKPHOSPHAT 104*  --  87   TBILIRUBIN 0.4  --  0.5     Recent Labs     12/09/22 1617 12/09/22  1830 12/10/22  0246   RBC 3.72* 3.56*  3.19*   HEMOGLOBIN 12.7* 12.2* 11.0*   HEMATOCRIT 36.9* 35.4* 32.4*   PLATELETCT 223 208 178   PROTHROMBTM 13.5  --   --    APTT 24.4*  --   --    INR 1.04  --   --      Recent Results (from the past 24 hour(s))   CBC WITH DIFFERENTIAL    Collection Time: 12/09/22  4:17 PM   Result Value Ref Range    WBC 11.2 (H) 4.8 - 10.8 K/uL    RBC 3.72 (L) 4.70 - 6.10 M/uL    Hemoglobin 12.7 (L) 14.0 - 18.0 g/dL    Hematocrit 36.9 (L) 42.0 - 52.0 %    MCV 99.2 (H) 81.4 - 97.8 fL    MCH 34.1 (H) 27.0 - 33.0 pg    MCHC 34.4 33.7 - 35.3 g/dL    RDW 45.7 35.9 - 50.0 fL    Platelet Count 223 164 - 446 K/uL    MPV 9.3 9.0 - 12.9 fL    Neutrophils-Polys 82.20 (H) 44.00 - 72.00 %    Lymphocytes 9.60 (L) 22.00 - 41.00 %    Monocytes 6.00 0.00 - 13.40 %    Eosinophils 1.20 0.00 - 6.90 %    Basophils 0.60 0.00 - 1.80 %    Immature Granulocytes 0.40 0.00 - 0.90 %    Nucleated RBC 0.00 /100 WBC    Neutrophils (Absolute) 9.24 (H) 1.82 - 7.42 K/uL    Lymphs (Absolute) 1.08 1.00 - 4.80 K/uL    Monos (Absolute) 0.67 0.00 - 0.85 K/uL    Eos (Absolute) 0.13 0.00 - 0.51 K/uL    Baso (Absolute) 0.07 0.00 - 0.12 K/uL    Immature Granulocytes (abs) 0.04 0.00 - 0.11 K/uL    NRBC (Absolute) 0.00 K/uL   COMP METABOLIC PANEL    Collection Time: 12/09/22  4:17 PM   Result Value Ref Range    Sodium 139 135 - 145 mmol/L    Potassium 3.4 (L) 3.6 - 5.5 mmol/L    Chloride 107 96 - 112 mmol/L    Co2 19 (L) 20 - 33 mmol/L    Anion Gap 13.0 7.0 - 16.0    Glucose 155 (H) 65 - 99 mg/dL    Bun 32 (H) 8 - 22 mg/dL    Creatinine 0.75 0.50 - 1.40 mg/dL    Calcium 8.3 (L) 8.4 - 10.2 mg/dL    AST(SGOT) 20 12 - 45 U/L    ALT(SGPT) 30 2 - 50 U/L    Alkaline Phosphatase 104 (H) 30 - 99 U/L    Total Bilirubin 0.4 0.1 - 1.5 mg/dL    Albumin 3.8 3.2 - 4.9 g/dL    Total Protein 6.0 6.0 - 8.2 g/dL    Globulin 2.2 1.9 - 3.5 g/dL    A-G Ratio 1.7 g/dL   LIPASE    Collection Time: 12/09/22  4:17 PM   Result Value Ref Range    Lipase 27 7 - 58 U/L   COD - Adult (Type and Screen)     Collection Time: 22  4:17 PM   Result Value Ref Range    ABO Grouping Only O     Rh Grouping Only POS     Antibody Screen-Cod NEG    PT/INR    Collection Time: 22  4:17 PM   Result Value Ref Range    PT 13.5 12.0 - 14.6 sec    INR 1.04 0.87 - 1.13   PTT    Collection Time: 22  4:17 PM   Result Value Ref Range    APTT 24.4 (L) 24.7 - 36.0 sec   ESTIMATED GFR    Collection Time: 22  4:17 PM   Result Value Ref Range    GFR (CKD-EPI) 97 >60 mL/min/1.73 m 2   EKG    Collection Time: 22  4:25 PM   Result Value Ref Range    Report       [facility]    Test Date:  2022  Pt Name:    BETTE ARSHAD                Department: [facility]  MRN:        [id number]                      Room:       [facility]  Gender:     Male                         Technician: [id number]  :        1950                   Requested By:ER TRIAGE PROTOCOL  Order #:    [id number]                    Reading MD: XAIVER STUART MD    Measurements  Intervals                                Axis  Rate:       72                           P:          -32  OR:         172                          QRS:        28  QRSD:       84                           T:          0  QT:         400  QTc:        438    Interpretive Statements  SINUS RHYTHM  EARLY PRECORDIAL R/S TRANSITION  Compared to ECG 2019 11:39:30  No significant changes  Electronically Signed On 2022 22:27:23 PST by XAVIER STUART MD     ABO Rh Confirm    Collection Time: 22  5:20 PM   Result Value Ref Range    ABO Rh Confirm O POS    CBC WITHOUT DIFFERENTIAL    Collection Time: 22  6:30 PM   Result Value Ref Range    WBC 15.7 (H) 4.8 - 10.8 K/uL    RBC 3.56 (L) 4.70 - 6.10 M/uL    Hemoglobin 12.2 (L) 14.0 - 18.0 g/dL    Hematocrit 35.4 (L) 42.0 - 52.0 %    MCV 99.4 (H) 81.4 - 97.8 fL    MCH 34.3 (H) 27.0 - 33.0 pg    MCHC 34.5 33.7 - 35.3 g/dL    RDW 46.2 35.9 - 50.0 fL    Platelet Count 208 164 - 446 K/uL    MPV 9.0 9.0 -  12.9 fL   PLATELET MAPPING WITH BASIC TEG    Collection Time: 12/09/22  8:00 PM   Result Value Ref Range    Reaction Time Initial-R 5.2 4.6 - 9.1 min    React Time Initial Hep 4.8 4.3 - 8.3 min    Clot Kinetics-K 1.2 0.8 - 2.1 min    Clot Angle-Angle 74.1 63.0 - 78.0 degrees    Maximum Clot Strength-MA 57.9 52.0 - 69.0 mm    TEG Functional Fibrinogen(MA) 19.1 15.0 - 32.0 mm    Lysis 30 minutes-LY30 0.0 0.0 - 2.6 %    % Inhibition ADP 18.5 (H) 0.0 - 17.0 %    % Inhibition AA 11.7 (H) 0.0 - 11.0 %    TEG Algorithm Link Algorithm    URINALYSIS    Collection Time: 12/10/22 12:21 AM    Specimen: Urine   Result Value Ref Range    Color Yellow     Character Clear     Specific Gravity 1.010 <1.035    Ph 6.0 5.0 - 8.0    Glucose Negative Negative mg/dL    Ketones Negative Negative mg/dL    Protein Negative Negative mg/dL    Bilirubin Negative Negative    Nitrite Negative Negative    Leukocyte Esterase Negative Negative    Occult Blood Small (A) Negative    Micro Urine Req Microscopic    URINE MICROSCOPIC (W/UA)    Collection Time: 12/10/22 12:21 AM   Result Value Ref Range    WBC Rare (A) /hpf    RBC 0-2 (A) /hpf    Mucous Threads Few /hpf   CBC without Differential    Collection Time: 12/10/22  2:46 AM   Result Value Ref Range    WBC 7.7 4.8 - 10.8 K/uL    RBC 3.19 (L) 4.70 - 6.10 M/uL    Hemoglobin 11.0 (L) 14.0 - 18.0 g/dL    Hematocrit 32.4 (L) 42.0 - 52.0 %    .6 (H) 81.4 - 97.8 fL    MCH 34.5 (H) 27.0 - 33.0 pg    MCHC 34.0 33.7 - 35.3 g/dL    RDW 47.7 35.9 - 50.0 fL    Platelet Count 178 164 - 446 K/uL    MPV 9.2 9.0 - 12.9 fL   Comp Metabolic Panel (CMP)    Collection Time: 12/10/22  2:46 AM   Result Value Ref Range    Sodium 141 135 - 145 mmol/L    Potassium 3.7 3.6 - 5.5 mmol/L    Chloride 112 96 - 112 mmol/L    Co2 20 20 - 33 mmol/L    Anion Gap 9.0 7.0 - 16.0    Glucose 105 (H) 65 - 99 mg/dL    Bun 24 (H) 8 - 22 mg/dL    Creatinine 0.57 0.50 - 1.40 mg/dL    Calcium 7.9 (L) 8.4 - 10.2 mg/dL    AST(SGOT) 17  12 - 45 U/L    ALT(SGPT) 25 2 - 50 U/L    Alkaline Phosphatase 87 30 - 99 U/L    Total Bilirubin 0.5 0.1 - 1.5 mg/dL    Albumin 3.3 3.2 - 4.9 g/dL    Total Protein 5.3 (L) 6.0 - 8.2 g/dL    Globulin 2.0 1.9 - 3.5 g/dL    A-G Ratio 1.7 g/dL   Magnesium    Collection Time: 12/10/22  2:46 AM   Result Value Ref Range    Magnesium 1.9 1.5 - 2.5 mg/dL   ESTIMATED GFR    Collection Time: 12/10/22  2:46 AM   Result Value Ref Range    GFR (CKD-EPI) 105 >60 mL/min/1.73 m 2         Radiology Review:  CTA ABDOMEN PELVIS W & W/O POST PROCESS   Final Result      1.  Colonic diverticulosis.   2.  No evidence for active GI bleed.   3.  Moderate to marked prostatomegaly.   4.  Large mildly complex right renal cyst again noted.   5.  Tiny hiatal hernia.   6.  No aortic aneurysm or dissection. Atherosclerosis.      EC-ECHOCARDIOGRAM COMPLETE W/O CONT    (Results Pending)         MDM (Data Review):   -Records reviewed and summarized in current documentation  -I personally reviewed and interpreted the laboratory results  -I personally reviewed the radiology images        Medical Decision Making, by Problem:  Active Hospital Problems    Diagnosis     Primary hypertension [I10]     Syncope [R55]     GI bleed [K92.2]            Assessment/Recommendations:  72-year-old male with acute onset painless GI bleeding.  Differential diagnosis does include diverticular bleed, peptic ulcer with bleeding, less likely malignancy, or other bleeding lesion.    Assessment:  -GI bleed  -Acute posthemorrhagic anemia  -Syncope- suspect secondary to GI bleeding and blood loss  -Hypertension  -History of colectomy     Plan:  -Clear liquid diet, n.p.o. after midnight  -MoviPrep this evening  -EGD and colonoscopy with possible hemostasis, biopsies at 8 AM tomorrow morning with Dr. Nelson Benjamin.    Risks, benefits, and alternatives of aforementioned procedures were discussed with patient.  Consenting person(s) were given opportunities to ask questions and  discuss other options.  Risks including but not limited to perforation, infection, bleeding, missed lesion(s), possible need for surgery(ies) and/or interventional radiology, possible need for repeat procedure(s) and/or additional testing, hospitalization possibly prolonged, cardiac and/or pulmonary event, aspiration, hypoxia, stroke, medication and/or anesthesia reaction, indefinite diagnosis, discomfort, unsuccessful and/or incomplete procedure, ineffective therapy and/or persistent symptoms, damage to adjacent organs and/or vascular structures, and other adverse events possibly life-threatening.  Interactive discussion was undertaken with Layman's terms. I answered questions in full and to satisfaction.  Consenting person(s) stated understanding and acceptance of these risks, and wished to proceed.  Informed consent was given in clear state of mind.    -Serial hemoglobin and hematocrit  -Protonix 40 mg IV twice daily  -Agree with echocardiogram      CORINNE Uriarte      Thank you for inviting me to participate in the care of this patient. Please do not hesitate to call GI consultants with additional questions/concerns or changes in the patient's clinical status at [phone].      Core Quality Measures   Reviewed items:  Labs, Medications and Radiology reports reviewed

## 2022-12-10 NOTE — PROGRESS NOTES
Monitor Summary:    Rhythm:  ST/SR   Rate Range:    Ectopy: rPVC    Measurements:  0.18/0.08/0.40  (Measured by monitor tech)    Provider Procedure Text (D): After obtaining clear surgical margins the defect was repaired by another provider.

## 2022-12-10 NOTE — PROGRESS NOTES
4 Eyes Skin Assessment Completed by IVANA Valles and IVANA Martins.    Head WDL  Ears WDL  Nose WDL  Mouth WDL  Neck WDL  Breast/Chest red dots around chest  Shoulder Blades WDL  Spine WDL  (R) Arm/Elbow/Hand WDL  (L) Arm/Elbow/Hand WDL  Abdomen midline abd scar from previous surgeries   Groin WDL  Scrotum/Coccyx/Buttocks WDL  (R) Leg Bruising, varicose veins  (L) Leg Bruising, varicose veins  (R) Heel/Foot/Toe WDL  (L) Heel/Foot/Toe WDL          Devices In Places Tele Box      Interventions In Place Pillows    Possible Skin Injury No    Pictures Uploaded Into Epic N/A  Wound Consult Placed N/A  RN Wound Prevention Protocol Ordered No

## 2022-12-10 NOTE — ASSESSMENT & PLAN NOTE
Likely secondary to GI bleeding.  EKG shows sinus rhythm with a rate of 72, there is no ST elevation, there is 1 mm ST depression in lead II, III and aVF, QTC is 438  Orthostatic vital  Telemetry monitor  Echocardiography pending  No symptoms here, but fall precautions

## 2022-12-10 NOTE — ED TRIAGE NOTES
"Chief Complaint   Patient presents with    Bloody Stools     Pt had 3 episodes of bright red bloody diarrhea and a witness syncope episodes that prompted family to call EMS. In route BP was soft in the 100's SBP. Pt is experiencing nausea with no vomiting. He is pale and diaphoretic.      BP (!) 98/66   Pulse 73   Temp 35.8 °C (96.5 °F) (Temporal)   Resp 18   Ht 1.727 m (5' 8\")   Wt 84.8 kg (187 lb)   SpO2 95%   BMI 28.43 kg/m²     "

## 2022-12-11 ENCOUNTER — ANESTHESIA EVENT (OUTPATIENT)
Dept: SURGERY | Facility: MEDICAL CENTER | Age: 72
DRG: 378 | End: 2022-12-11
Payer: MEDICARE

## 2022-12-11 ENCOUNTER — ANESTHESIA (OUTPATIENT)
Dept: SURGERY | Facility: MEDICAL CENTER | Age: 72
DRG: 378 | End: 2022-12-11
Payer: MEDICARE

## 2022-12-11 VITALS
SYSTOLIC BLOOD PRESSURE: 133 MMHG | OXYGEN SATURATION: 97 % | HEART RATE: 72 BPM | RESPIRATION RATE: 18 BRPM | BODY MASS INDEX: 27.23 KG/M2 | DIASTOLIC BLOOD PRESSURE: 81 MMHG | WEIGHT: 179.68 LBS | TEMPERATURE: 97.4 F | HEIGHT: 68 IN

## 2022-12-11 LAB
ANION GAP SERPL CALC-SCNC: 12 MMOL/L (ref 7–16)
BUN SERPL-MCNC: 11 MG/DL (ref 8–22)
CALCIUM SERPL-MCNC: 8.1 MG/DL (ref 8.4–10.2)
CHLORIDE SERPL-SCNC: 109 MMOL/L (ref 96–112)
CO2 SERPL-SCNC: 19 MMOL/L (ref 20–33)
CREAT SERPL-MCNC: 0.53 MG/DL (ref 0.5–1.4)
GFR SERPLBLD CREATININE-BSD FMLA CKD-EPI: 108 ML/MIN/1.73 M 2
GLUCOSE SERPL-MCNC: 98 MG/DL (ref 65–99)
HGB BLD-MCNC: 11.1 G/DL (ref 14–18)
HGB BLD-MCNC: 11.4 G/DL (ref 14–18)
MAGNESIUM SERPL-MCNC: 2 MG/DL (ref 1.5–2.5)
PATHOLOGY CONSULT NOTE: NORMAL
POTASSIUM SERPL-SCNC: 3.4 MMOL/L (ref 3.6–5.5)
SODIUM SERPL-SCNC: 140 MMOL/L (ref 135–145)

## 2022-12-11 PROCEDURE — 700102 HCHG RX REV CODE 250 W/ 637 OVERRIDE(OP): Performed by: NURSE PRACTITIONER

## 2022-12-11 PROCEDURE — 160203 HCHG ENDO MINUTES - 1ST 30 MINS LEVEL 4: Performed by: INTERNAL MEDICINE

## 2022-12-11 PROCEDURE — 83735 ASSAY OF MAGNESIUM: CPT

## 2022-12-11 PROCEDURE — 99100 ANES PT EXTEME AGE<1 YR&>70: CPT | Performed by: ANESTHESIOLOGY

## 2022-12-11 PROCEDURE — 0DBL8ZZ EXCISION OF TRANSVERSE COLON, VIA NATURAL OR ARTIFICIAL OPENING ENDOSCOPIC: ICD-10-PCS | Performed by: INTERNAL MEDICINE

## 2022-12-11 PROCEDURE — 700101 HCHG RX REV CODE 250: Performed by: ANESTHESIOLOGY

## 2022-12-11 PROCEDURE — 00813 ANES UPR LWR GI NDSC PX: CPT | Performed by: ANESTHESIOLOGY

## 2022-12-11 PROCEDURE — 0DB58ZX EXCISION OF ESOPHAGUS, VIA NATURAL OR ARTIFICIAL OPENING ENDOSCOPIC, DIAGNOSTIC: ICD-10-PCS | Performed by: INTERNAL MEDICINE

## 2022-12-11 PROCEDURE — 160048 HCHG OR STATISTICAL LEVEL 1-5: Performed by: INTERNAL MEDICINE

## 2022-12-11 PROCEDURE — 94760 N-INVAS EAR/PLS OXIMETRY 1: CPT

## 2022-12-11 PROCEDURE — 700111 HCHG RX REV CODE 636 W/ 250 OVERRIDE (IP): Performed by: HOSPITALIST

## 2022-12-11 PROCEDURE — 700102 HCHG RX REV CODE 250 W/ 637 OVERRIDE(OP): Performed by: HOSPITALIST

## 2022-12-11 PROCEDURE — 88312 SPECIAL STAINS GROUP 1: CPT

## 2022-12-11 PROCEDURE — 110371 HCHG SHELL REV 272: Performed by: INTERNAL MEDICINE

## 2022-12-11 PROCEDURE — 160035 HCHG PACU - 1ST 60 MINS PHASE I: Performed by: INTERNAL MEDICINE

## 2022-12-11 PROCEDURE — C9113 INJ PANTOPRAZOLE SODIUM, VIA: HCPCS | Performed by: HOSPITALIST

## 2022-12-11 PROCEDURE — 36415 COLL VENOUS BLD VENIPUNCTURE: CPT

## 2022-12-11 PROCEDURE — 99239 HOSP IP/OBS DSCHRG MGMT >30: CPT | Performed by: INTERNAL MEDICINE

## 2022-12-11 PROCEDURE — 700111 HCHG RX REV CODE 636 W/ 250 OVERRIDE (IP): Performed by: ANESTHESIOLOGY

## 2022-12-11 PROCEDURE — 160009 HCHG ANES TIME/MIN: Performed by: INTERNAL MEDICINE

## 2022-12-11 PROCEDURE — 160208 HCHG ENDO MINUTES - EA ADDL 1 MIN LEVEL 4: Performed by: INTERNAL MEDICINE

## 2022-12-11 PROCEDURE — 85018 HEMOGLOBIN: CPT | Mod: 91

## 2022-12-11 PROCEDURE — A9270 NON-COVERED ITEM OR SERVICE: HCPCS | Performed by: NURSE PRACTITIONER

## 2022-12-11 PROCEDURE — 700105 HCHG RX REV CODE 258: Performed by: INTERNAL MEDICINE

## 2022-12-11 PROCEDURE — 0DB68ZX EXCISION OF STOMACH, VIA NATURAL OR ARTIFICIAL OPENING ENDOSCOPIC, DIAGNOSTIC: ICD-10-PCS | Performed by: INTERNAL MEDICINE

## 2022-12-11 PROCEDURE — 0W3P8ZZ CONTROL BLEEDING IN GASTROINTESTINAL TRACT, VIA NATURAL OR ARTIFICIAL OPENING ENDOSCOPIC: ICD-10-PCS | Performed by: INTERNAL MEDICINE

## 2022-12-11 PROCEDURE — 88305 TISSUE EXAM BY PATHOLOGIST: CPT

## 2022-12-11 PROCEDURE — A9270 NON-COVERED ITEM OR SERVICE: HCPCS | Performed by: HOSPITALIST

## 2022-12-11 PROCEDURE — 160002 HCHG RECOVERY MINUTES (STAT): Performed by: INTERNAL MEDICINE

## 2022-12-11 PROCEDURE — 80048 BASIC METABOLIC PNL TOTAL CA: CPT

## 2022-12-11 RX ORDER — LIDOCAINE HYDROCHLORIDE 20 MG/ML
INJECTION, SOLUTION EPIDURAL; INFILTRATION; INTRACAUDAL; PERINEURAL PRN
Status: DISCONTINUED | OUTPATIENT
Start: 2022-12-11 | End: 2022-12-11 | Stop reason: SURG

## 2022-12-11 RX ORDER — DIPHENHYDRAMINE HYDROCHLORIDE 50 MG/ML
12.5 INJECTION INTRAMUSCULAR; INTRAVENOUS
Status: DISCONTINUED | OUTPATIENT
Start: 2022-12-11 | End: 2022-12-11 | Stop reason: HOSPADM

## 2022-12-11 RX ORDER — ONDANSETRON 2 MG/ML
4 INJECTION INTRAMUSCULAR; INTRAVENOUS
Status: DISCONTINUED | OUTPATIENT
Start: 2022-12-11 | End: 2022-12-11 | Stop reason: HOSPADM

## 2022-12-11 RX ORDER — SODIUM CHLORIDE, SODIUM LACTATE, POTASSIUM CHLORIDE, CALCIUM CHLORIDE 600; 310; 30; 20 MG/100ML; MG/100ML; MG/100ML; MG/100ML
INJECTION, SOLUTION INTRAVENOUS CONTINUOUS
Status: ACTIVE | OUTPATIENT
Start: 2022-12-11 | End: 2022-12-11

## 2022-12-11 RX ORDER — ONDANSETRON 2 MG/ML
INJECTION INTRAMUSCULAR; INTRAVENOUS PRN
Status: DISCONTINUED | OUTPATIENT
Start: 2022-12-11 | End: 2022-12-11 | Stop reason: SURG

## 2022-12-11 RX ORDER — PHENYLEPHRINE HCL IN 0.9% NACL 0.5 MG/5ML
SYRINGE (ML) INTRAVENOUS PRN
Status: DISCONTINUED | OUTPATIENT
Start: 2022-12-11 | End: 2022-12-11 | Stop reason: SURG

## 2022-12-11 RX ORDER — DEXAMETHASONE SODIUM PHOSPHATE 4 MG/ML
INJECTION, SOLUTION INTRA-ARTICULAR; INTRALESIONAL; INTRAMUSCULAR; INTRAVENOUS; SOFT TISSUE PRN
Status: DISCONTINUED | OUTPATIENT
Start: 2022-12-11 | End: 2022-12-11 | Stop reason: SURG

## 2022-12-11 RX ORDER — LABETALOL HYDROCHLORIDE 5 MG/ML
5 INJECTION, SOLUTION INTRAVENOUS
Status: DISCONTINUED | OUTPATIENT
Start: 2022-12-11 | End: 2022-12-11 | Stop reason: HOSPADM

## 2022-12-11 RX ORDER — HYDRALAZINE HYDROCHLORIDE 20 MG/ML
5 INJECTION INTRAMUSCULAR; INTRAVENOUS
Status: DISCONTINUED | OUTPATIENT
Start: 2022-12-11 | End: 2022-12-11 | Stop reason: HOSPADM

## 2022-12-11 RX ORDER — HALOPERIDOL 5 MG/ML
1 INJECTION INTRAMUSCULAR
Status: DISCONTINUED | OUTPATIENT
Start: 2022-12-11 | End: 2022-12-11 | Stop reason: HOSPADM

## 2022-12-11 RX ORDER — ROCURONIUM BROMIDE 10 MG/ML
INJECTION, SOLUTION INTRAVENOUS PRN
Status: DISCONTINUED | OUTPATIENT
Start: 2022-12-11 | End: 2022-12-11 | Stop reason: SURG

## 2022-12-11 RX ORDER — LIDOCAINE HYDROCHLORIDE 40 MG/ML
SOLUTION TOPICAL PRN
Status: DISCONTINUED | OUTPATIENT
Start: 2022-12-11 | End: 2022-12-11 | Stop reason: SURG

## 2022-12-11 RX ORDER — SODIUM CHLORIDE, SODIUM LACTATE, POTASSIUM CHLORIDE, CALCIUM CHLORIDE 600; 310; 30; 20 MG/100ML; MG/100ML; MG/100ML; MG/100ML
INJECTION, SOLUTION INTRAVENOUS CONTINUOUS
Status: DISCONTINUED | OUTPATIENT
Start: 2022-12-11 | End: 2022-12-11 | Stop reason: HOSPADM

## 2022-12-11 RX ORDER — PANTOPRAZOLE SODIUM 40 MG/1
40 TABLET, DELAYED RELEASE ORAL 2 TIMES DAILY
Qty: 60 TABLET | Refills: 0 | Status: SHIPPED | OUTPATIENT
Start: 2022-12-11 | End: 2023-01-10

## 2022-12-11 RX ADMIN — EPHEDRINE SULFATE 5 MG: 50 INJECTION, SOLUTION INTRAVENOUS at 08:23

## 2022-12-11 RX ADMIN — ONDANSETRON 4 MG: 2 INJECTION INTRAMUSCULAR; INTRAVENOUS at 08:26

## 2022-12-11 RX ADMIN — ROCURONIUM BROMIDE 50 MG: 10 INJECTION, SOLUTION INTRAVENOUS at 08:12

## 2022-12-11 RX ADMIN — SODIUM CHLORIDE, POTASSIUM CHLORIDE, SODIUM LACTATE AND CALCIUM CHLORIDE: 600; 310; 30; 20 INJECTION, SOLUTION INTRAVENOUS at 08:07

## 2022-12-11 RX ADMIN — VALSARTAN 160 MG: 80 TABLET, FILM COATED ORAL at 05:27

## 2022-12-11 RX ADMIN — PANTOPRAZOLE SODIUM 40 MG: 40 INJECTION, POWDER, LYOPHILIZED, FOR SOLUTION INTRAVENOUS at 05:27

## 2022-12-11 RX ADMIN — DEXAMETHASONE SODIUM PHOSPHATE 4 MG: 4 INJECTION, SOLUTION INTRA-ARTICULAR; INTRALESIONAL; INTRAMUSCULAR; INTRAVENOUS; SOFT TISSUE at 08:18

## 2022-12-11 RX ADMIN — FENTANYL CITRATE 100 MCG: 50 INJECTION, SOLUTION INTRAMUSCULAR; INTRAVENOUS at 08:11

## 2022-12-11 RX ADMIN — LIDOCAINE HYDROCHLORIDE 4 ML: 40 SOLUTION TOPICAL at 08:14

## 2022-12-11 RX ADMIN — Medication 100 MCG: at 08:23

## 2022-12-11 RX ADMIN — PEG-3350, SODIUM SULFATE, SODIUM CHLORIDE, POTASSIUM CHLORIDE, SODIUM ASCORBATE AND ASCORBIC ACID 100 G: KIT at 02:00

## 2022-12-11 RX ADMIN — LIDOCAINE HYDROCHLORIDE 3 ML: 20 INJECTION, SOLUTION EPIDURAL; INFILTRATION; INTRACAUDAL at 08:11

## 2022-12-11 RX ADMIN — PROPOFOL 10 MG: 10 INJECTION, EMULSION INTRAVENOUS at 08:12

## 2022-12-11 RX ADMIN — Medication 100 MCG: at 08:19

## 2022-12-11 ASSESSMENT — ENCOUNTER SYMPTOMS
VOMITING: 0
ABDOMINAL PAIN: 0
SHORTNESS OF BREATH: 0
COUGH: 0
BLOOD IN STOOL: 0

## 2022-12-11 ASSESSMENT — FIBROSIS 4 INDEX
FIB4 SCORE: 1.34
FIB4 SCORE: 1.34

## 2022-12-11 NOTE — PROGRESS NOTES
He feels well and denied symptoms.  He has not had further signs of GI bleeding.    Lungs: Clear to auscultation anteriorly.  Heart: Normal S1 and S2.  No S3 or S4.  No murmurs or rubs.  Abdomen: No masses, tenderness or organomegaly.    Hgb: Stable at 11.4.    Assessment  GI bleeding with hematochezia, maroon stool and near syncope  Proceed with EGD and colonoscopy.    History of PUD in 2020    History of colectomy, reportedly for perforated diverticulum      Plan  Proceed with EGD/colonoscopy  I discussed the EGD and colonoscopy with possible biopsy and therapeutics procedure with the patient, including the indications, risks, benefits, alternatives and the method of performing the procedure.  Questions about the procedure were solicited and answered to his satisfaction.  He appeared to understand and agreed to proceed with it.

## 2022-12-11 NOTE — PROGRESS NOTES
Received report from IVANA Tolentino. Safety precautions in place. Bed locked and low. Offered assist. Call light and belongings within reach.

## 2022-12-11 NOTE — PROGRESS NOTES
Tele strip at 1340 shows sinus rhythm.      Measurements from am strip were as follows:  IL=0.18  QRS=0.10  QT=0.40    Tele Shift Summary:    Rhythm : Sinus Rhythm  Rate : 74  Ectopy : Per CCT Jules, pt had rare PVC and rare Bigem.     Telemetry monitoring strips placed in pt's chart.

## 2022-12-11 NOTE — ANESTHESIA PREPROCEDURE EVALUATION
Case: [id number] Date/Time: 12/11/22 0745    Procedure: EGD, WITH COLONOSCOPY    Location:  ENDOSCOPIC ULTRASOUND ROOM / SURGERY [facility]    Surgeons: Nelson Benjamin M.D.      GI bleed    Relevant Problems   CARDIAC   (positive) Hypertensive disorder   (positive) Primary hypertension      Other   (positive) GI bleed       Physical Exam    Airway   Mallampati: II  TM distance: >3 FB  Neck ROM: full       Cardiovascular - normal exam  Rhythm: regular  Rate: normal  (-) murmur     Dental - normal exam           Pulmonary - normal exam  Breath sounds clear to auscultation     Abdominal    Neurological - normal exam                 Anesthesia Plan    ASA 2- EMERGENT       Plan - general       Airway plan will be ETT          Induction: intravenous    Postoperative Plan: Postoperative administration of opioids is intended.    Pertinent diagnostic labs and testing reviewed    Informed Consent:    Anesthetic plan and risks discussed with patient.    Use of blood products discussed with: patient whom consented to blood products.

## 2022-12-11 NOTE — CARE PLAN
The patient is Watcher - Medium risk of patient condition declining or worsening    Shift Goals  Clinical Goals: Prep for colonoscopy, monitor for bleeding  Patient Goals: Rest  Family Goals: joanna    Progress made toward(s) clinical / shift goals:    Problem: Knowledge Deficit - Standard  Goal: Patient and family/care givers will demonstrate understanding of plan of care, disease process/condition, diagnostic tests and medications  12/11/2022 0233 by Hai Zapien, R.N.  Outcome: Progressing  12/11/2022 0231 by Hai Zapien R.N.  Outcome: Progressing     Problem: Fall Risk  Goal: Patient will remain free from falls  12/11/2022 0233 by Hai Zapien, R.N.  Outcome: Progressing  12/11/2022 0231 by Hai Zapien, R.N.  Outcome: Progressing     Problem: Gastrointestinal Irritability  Goal: Nausea and vomiting will be absent or improve  Outcome: Progressing     Problem: Risk for Fluid Volume Deficit Related to Bleeding  Goal: Fluid volume balance will be maintained  Outcome: Progressing  Goal: Patient will show no signs and symptoms of excessive bleeding  Outcome: Progressing       Patient is not progressing towards the following goals:      Problem: Bowel Elimination  Goal: Establish and maintain regular bowel function  Outcome: Not Progressing

## 2022-12-11 NOTE — OR NURSING
Pt allergies and NPO status verified. Home medications reconciled. Belongings secured. Pt verbalizes understanding of pain scale, expected course of stay and plan of care. Surgical procedure verified with pt. IV access assessed. All questions answered. Bed in low position. Call light within reach.

## 2022-12-11 NOTE — DISCHARGE SUMMARY
"Discharge Summary    CHIEF COMPLAINT ON ADMISSION  Chief Complaint   Patient presents with    Bloody Stools     Pt had 3 episodes of bright red bloody diarrhea and a witness syncope episodes that prompted family to call EMS. In route BP was soft in the 100's SBP. Pt is experiencing nausea with no vomiting. He is pale and diaphoretic.        Reason for Admission  EMS     Admission Date  12/9/2022    CODE STATUS  Full Code    HPI & HOSPITAL COURSE  Per notes, \"72 y.o. male with a past medical history of primary hypertension who presented 12/9/2022 with transient loss of consciousness after having three bloody bowel movements.  Patient reports that he had three loose bloody bowel movements on the day of admission.  Feeling weak and dizzy and then suddenly passed out.  He denies noticing any chest pain shortness of breath focal motor weakness prior to passing out.  He denies hitting his head.  He denies having abdominal pain nausea or vomiting.\"    Patient was admitted and monitored for bloody bowel movements.  He was evaluated by gastroenterology and underwent an EGD and colonoscopy on 12/11, without complications.  EGD and colonoscopy showed mild gastritis, mild duodenitis, several small gastric polyps, 5 to 6 mm firm submucosal esophageal nodules, sigmoid polyp, diverticula throughout the colon.  It also showed diverticulum with adherent clot in the base, likely source of bleeding, which was treated with endoscopic clip.     Therefore, he is discharged in good and stable condition to home with close outpatient follow-up.    The patient met 2-midnight criteria for an inpatient stay at the time of discharge.    Discharge Date  12/11/2022    FOLLOW UP ITEMS POST DISCHARGE  FU with GI   FU with PCP    DISCHARGE DIAGNOSES  Principal Problem:    GI bleed POA: Yes  Active Problems:    Primary hypertension POA: Yes    Syncope POA: Yes  Resolved Problems:    * No resolved hospital problems. *      FOLLOW UP  No future " appointments.  No follow-up provider specified.    MEDICATIONS ON DISCHARGE     Medication List        ASK your doctor about these medications        Instructions   meloxicam 7.5 MG Tabs  Commonly known as: MOBIC   Take 1 Tablet by mouth every day.  Dose: 7.5 mg     NON SPECIFIED   Take 2 Tablets by mouth every day. PROSTAGENIX  Dose: 2 Tablet     valsartan 160 MG Tabs  Commonly known as: DIOVAN   Take 160 mg by mouth every day.  Dose: 160 mg     VITAMIN C PO   Take 1 Tablet by mouth every day.  Dose: 1 Tablet     VITAMIN E PO   Take 1 Tablet by mouth every day.  Dose: 1 Tablet     ZINC PO   Take 1 Tablet by mouth every day.  Dose: 1 Tablet              Allergies  Allergies   Allergen Reactions    Morphine Nausea       DIET  Orders Placed This Encounter   Procedures    Diet NPO Restrict to: Sips with Medications     Standing Status:   Standing     Number of Occurrences:   8     Order Specific Question:   Diet NPO Restrict to:     Answer:   Sips with Medications [3]       ACTIVITY  As tolerated.  Weight bearing as tolerated    CONSULTATIONS  GI      PROCEDURES  EGD/Colonoscopy    LABORATORY  Lab Results   Component Value Date    SODIUM 140 12/11/2022    POTASSIUM 3.4 (L) 12/11/2022    CHLORIDE 109 12/11/2022    CO2 19 (L) 12/11/2022    GLUCOSE 98 12/11/2022    BUN 11 12/11/2022    CREATININE 0.53 12/11/2022        Lab Results   Component Value Date    WBC 7.7 12/10/2022    HEMOGLOBIN 11.4 (L) 12/11/2022    HEMATOCRIT 32.4 (L) 12/10/2022    PLATELETCT 178 12/10/2022        Total time of the discharge process exceeds 48 minutes.

## 2022-12-11 NOTE — PROGRESS NOTES
Report received from Kimberli about 0930 and her arrived about 0940 and able to walk from the gurney to the bed as a hand-held.  Post-op vs taken and pt sipping on ice water.      Dr. Hernandez rounded and going to discharge home.  Dr. Benjamin came to bedside after and discussed his findings and follow-up with him.      Discharging Patient home per physician order.  Discharged with wife to home at 1225.  Demonstrated understanding of discharge instructions, follow up appointments, home medications, prescriptions sent to [facility], and nursing care instructions for post-op bleeding and high fiber diet.  Ambulating without assistance, voiding without difficulty, pain well controlled, tolerating oral medications, oxygen saturation greater than 90% , tolerating diet.   Educational handouts given and discussed.  Verbalized understanding of discharge instructions and educational handouts.  All questions answered.  Belongings with patient at time of discharge. Pt was sent home with mask in place.

## 2022-12-11 NOTE — PROGRESS NOTES
Telemetry Shift Summary     Rhythm SR/ ST  HR Range   Ectopy f-o PVC, r Coup  Measurements  0.16/ 0.08/ 0.38  Per strip printed 0400     Normal Values  Rhythm SR  HR Range    Measurements 0.12-0.20 / 0.06-0.10  / 0.30-0.52

## 2022-12-11 NOTE — ANESTHESIA POSTPROCEDURE EVALUATION
Patient: Luis Angel Sanchez    Procedure Summary     Date: 12/11/22 Room / Location:  ENDOSCOPIC ULTRASOUND ROOM / SURGERY [facility]    Anesthesia Start: 0807 Anesthesia Stop: 0851    Procedure: EGD, WITH COLONOSCOPY (Mouth) Diagnosis: (GI bleeding probably due to a diverticulum)    Surgeons: Nelson Benjamin M.D. Responsible Provider: Savage Smith M.D.    Anesthesia Type: general ASA Status: 2 - Emergent          Final Anesthesia Type: general  Last vitals  BP   Blood Pressure : 139/75    Temp   36.3 °C (97.4 °F)    Pulse   73   Resp   18    SpO2   96 %      Anesthesia Post Evaluation    Patient location during evaluation: PACU  Patient participation: complete - patient participated  Level of consciousness: awake and alert    Airway patency: patent  Anesthetic complications: no  Cardiovascular status: hemodynamically stable  Respiratory status: acceptable  Hydration status: euvolemic    PONV: none          No notable events documented.     Nurse Pain Score: 0 (NPRS)

## 2022-12-11 NOTE — CARE PLAN
Problem: Knowledge Deficit - Standard  Goal: Patient and family/care givers will demonstrate understanding of plan of care, disease process/condition, diagnostic tests and medications  Outcome: Progressing     Problem: Gastrointestinal Irritability  Goal: Nausea and vomiting will be absent or improve  Outcome: Discharged - Not Met     Problem: Risk for Bleeding  Goal: Patient will take measures to prevent bleeding and recognizes signs of bleeding that need to be reported immediately to a health care professional  Outcome: Discharged - Not Met  Goal: Patient will not experience bleeding as evidenced by normal blood pressure, stable hematocrit and hemoglobin levels and desired ranges for coagulation profiles  Outcome: Discharged - Not Met   The patient is Stable - Low risk of patient condition declining or worsening    Shift Goals  Clinical Goals: Tolerate clear liquids, d/c home from hospitalist  Patient Goals: Go home  Family Goals: joanna    Progress made toward(s) clinical / shift goals:  pt returned back from scope, plan is to discharge when wife gets here, GI came to bedside and spoke about f/u    Patient is not progressing towards the following goals:

## 2022-12-11 NOTE — ANESTHESIA PROCEDURE NOTES
Airway    Date/Time: 12/11/2022 8:14 AM  Performed by: Savage Smith M.D.  Authorized by: Savage Smith M.D.     Location:  OR  Urgency:  Elective  Difficult Airway: No    Indications for Airway Management:  Anesthesia      Spontaneous Ventilation: absent    Sedation Level:  Deep  Preoxygenated: Yes    Patient Position:  Sniffing  Mask Difficulty Assessment:  2 - vent by mask + OA or adjuvant +/- NMBA  Final Airway Type:  Endotracheal airway  Final Endotracheal Airway:  ETT  Cuffed: Yes    Technique Used for Successful ETT Placement:  Direct laryngoscopy  Devices/Methods Used in Placement:  Cricoid pressure    Insertion Site:  Oral  Blade Type:  Rogers  Laryngoscope Blade/Videolaryngoscope Blade Size:  2  ETT Size (mm):  7.0  Measured from:  Teeth  ETT to Teeth (cm):  24  Placement Verified by: auscultation and capnometry    Cormack-Lehane Classification:  Grade IIb - view of arytenoids or posterior of glottis only  Number of Attempts at Approach:  1   Atraumatic DLx1 first with Mac 3 then rogers 2.  Challenging intubation, recommend glidescope next time.

## 2022-12-11 NOTE — PROGRESS NOTES
Gastroenterology Progress Note     Author: Nelson Benjamin M.D.     Date & Time Created: 12/11/2022 10:53 AM    Patient ID:  Name:             Luis Angel Sanchez     YOB: 1950  Age:                 72 y.o.  male   MRN:               [id number]    Chief Complaint    GI bleeding with hematochezia, maroon stool and near syncope    Original GI History  This is a very pleasant 72 y.o. male with the past medical history that includes hypertension, perforated colon and large bowel resection years ago.  The patient presented to the hospital yesterday after he developed 3-4 episodes of painless GI bleeding.  He states he was in his normal state of health and then felt like he urgently needed to have a bowel movement with diarrhea.  When he looked in the toilet bowl he noticed a large amount of red blood in the toilet.  He states this occurred 2 more times.  During these episodes he became weak and dizzy.  Ambulance was called.  He states in the ambulance he had a near syncopal event and then another near syncopal event in the emergency room as well.  Upon initial admission noted to have blood pressure in the low 100s experiencing nausea with no vomiting.  Initial hemoglobin 12.7 down to 11 today.  CTA negative for acute GI bleed.  EKG without evidence of STEMI.  Echocardiogram pending.  CMP with BUN 32, normal creatinine.     The patient last had an EGD and colonoscopy in 2020 with Dr. Perkins with findings of gastritis and peptic ulcer disease, diverticulosis, polyp in the colon.  Pathology negative for H. pylori or malignancy.  Polyp positive for tubular adenoma.    Initial Labs  CBC: WBC 15.7, Hgb 12.2, MCV 99  INR: 1.0, PTT 24  CMP: BUN 32, creatinine 0.7, GFR 97.  Lipase: 27.    Imaging  CTA abdomen/pelvis 12/9: No GI bleeding.  Diverticulosis.  8 cm mildly complex right kidney cyst.  Echocardiogram: Normal.      Interval History  12/11/2022  Hgb 11.4 and stable.  No transfusions.  He feels well and  denied symptoms.    EGD with biopsy  1.  Mild gastritis  2.  Mild duodenitis in the bulb  3.  Several small gastric polyps in the body, largest was 3 mm.  4.  5 mm and 6 mm firm submucosal distal esophageal nodules at 39 cm    Colonoscopy with snare polypectomy  5.  Diverticulum at 39 cm with an adherent clot in the base, probably the source of bleeding.  6.  An endoscopic clip was placed adjacent to the diverticulum at 39 cm  7.  Status post polypectomy: 4 mm mid transverse polyp  8.  10 mm sigmoid polyp at 25 cm was left in place  9.  Diverticula throughout the colon from the sigmoid through the ascending colon.  10.  Suboptimal colon prep using Moviprep.  Use GoLytely for colonoscopy    Specimens  1.  Gastric biopsies.  Rule out H. Pylori  2.  4 mm mid transverse colon polyp      Assessment:  GI bleeding due to a diverticulum at 37 cm  Observe for a recurrence of bleeding.  Avoid aspirin/NSAIDs.    Mild gastritis and duodenitis  Await the gastric biopsies for H. pylori, and treat if positive.  Treat with a PPI for 1 month.  Avoid aspirin/NSAIDs.    5 mm and 6 mm firm submucosal distal esophageal nodules at 39 cm  Evaluate with EUS electively.    Several small gastric polyps left in place  Consider biopsying these during the EGD/EUS.    Colon polyp history.  Residual 10 mm sigmoid polyp at 25 cm was left in place today.  COL 2020: Removed a tubular adenoma.  Await the pathology report of the 4 mm mid transverse colon polyp.  Repeat the colonoscopy electively to remove the residual 10 mm sigmoid polyp at 25 cm.  Use a GoLytely preparation for colonoscopy.    History of PUD on EGD by Dr. Sosa on 1/20/2020    History of colectomy, reportedly for a perforated diverticulum    Recommendations  Avoid aspirin/NSAIDs.  Treat with a PPI for 1 month.  Await the gastric biopsies for H. pylori and treat if positive.  Elective EGD/EUS to evaluate the submucosal distal esophageal nodules and the small gastric polyps.  Elective  colonoscopy to remove the residual 10 mm sigmoid polyp at 25 cm.  Use a GoLytely preparation for colonoscopy.  Office visit with [facility] in 8 weeks.      GI will stand by for now.    Please call [facility] if additional GI questions arise ([phone]).  Thank you again for this consult.       Problem List  Active Hospital Problems    Diagnosis     *GI bleed [K92.2]     Primary hypertension [I10]     Syncope [R55]        Labs:  Recent Labs     12/09/22  1617 12/09/22  1830 12/10/22  0246 12/10/22  0907 12/10/22  1838 12/11/22  0230 12/11/22  1026   WBC 11.2* 15.7* 7.7  --   --   --   --    RBC 3.72* 3.56* 3.19*  --   --   --   --    HEMOGLOBIN 12.7* 12.2* 11.0*   < > 11.6* 11.4* 11.1*   HEMATOCRIT 36.9* 35.4* 32.4*  --   --   --   --    MCV 99.2* 99.4* 101.6*  --   --   --   --    MCH 34.1* 34.3* 34.5*  --   --   --   --    MCHC 34.4 34.5 34.0  --   --   --   --    RDW 45.7 46.2 47.7  --   --   --   --    PLATELETCT 223 208 178  --   --   --   --    MPV 9.3 9.0 9.2  --   --   --   --     < > = values in this interval not displayed.      Recent Labs     12/09/22 1617   APTT 24.4*   INR 1.04     Recent Labs     12/09/22  1617 12/10/22  0246 12/11/22  0230   SODIUM 139 141 140   POTASSIUM 3.4* 3.7 3.4*   CHLORIDE 107 112 109   CO2 19* 20 19*   GLUCOSE 155* 105* 98   BUN 32* 24* 11   CREATININE 0.75 0.57 0.53   CALCIUM 8.3* 7.9* 8.1*     Recent Labs     12/09/22  1617 12/10/22  0246 12/11/22  0230   ALTSGPT 30 25  --    ASTSGOT 20 17  --    ALKPHOSPHAT 104* 87  --    TBILIRUBIN 0.4 0.5  --    LIPASE 27  --   --    GLUCOSE 155* 105* 98       No results found for: BLOODCULTU, BLDCULT, BCHOLD     GI/Nutrition:  Orders Placed This Encounter   Procedures    Diet NPO Restrict to: Sips with Medications     Standing Status:   Standing     Number of Occurrences:   8     Order Specific Question:   Diet NPO Restrict to:     Answer:   Sips with Medications [3]       Hospital Medications:  valsartan, 160 mg, Oral,  "DAILY  senna-docusate, 2 Tablet, Oral, BID  pantoprazole, 40 mg, Intravenous, BID      PRN medications: acetaminophen, senna-docusate **AND** polyethylene glycol/lytes **AND** magnesium hydroxide **AND** bisacodyl, ondansetron, ondansetron    Current Outpatient Medications:  No current facility-administered medications on file prior to encounter.     Current Outpatient Medications on File Prior to Encounter   Medication Sig Dispense Refill    Ascorbic Acid (VITAMIN C PO) Take 1 Tablet by mouth every day.      Multiple Vitamins-Minerals (ZINC PO) Take 1 Tablet by mouth every day.      VITAMIN E PO Take 1 Tablet by mouth every day.      NON SPECIFIED Take 2 Tablets by mouth every day. PROSTAGENIX      valsartan (DIOVAN) 160 MG Tab Take 160 mg by mouth every day.      meloxicam (MOBIC) 7.5 MG Tab Take 1 Tablet by mouth every day. 30 Tablet 6       Fluids:    Intake/Output Summary (Last 24 hours) at 12/11/2022 1053  Last data filed at 12/11/2022 0915  Gross per 24 hour   Intake 1957.68 ml   Output 1150 ml   Net 807.68 ml     Weight: 81.5 kg (179 lb 10.8 oz)    Past Medical History:   Past Medical History:   Diagnosis Date    Cancer (HCC) 10/24/2017    \"lesion on back\"    Hypertension        Past Surgical History:  Past Surgical History:   Procedure Laterality Date    PANENDOSCOPY N/A 12/11/2022    Procedure: EGD, WITH COLONOSCOPY;  Surgeon: Nelson Benjamin M.D.;  Location: SURGERY [facility];  Service: Gastroenterology    VITRECTOMY POSTERIOR Left 2/18/2019    Procedure: VITRECTOMY POSTERIOR-  MEMBRANE STRIPPING, BBG, POSSIBLE AIR FLUID GAS, INTRAUVITREAL TRIESENCE, 23 GAUGE AND ANY OTHR INDICATED PROCEDURES;  Surgeon: Be Keane M.D.;  Location: SURGERY SAME DAY [facility];  Service: Ophthalmology    NODE BIOPSY SENTINEL Bilateral 1/11/2018    Procedure: NODE BIOPSY SENTINEL - AXILLARY;  Surgeon: Phil Mcmahan M.D.;  Location: SURGERY SAME DAY [facility];  Service: General    WIDE EXCISION Right " "10/27/2017    Procedure: WIDE EXCISION- RADICAL MALIGNANT MELANOMA BACK;  Surgeon: Phil Mcmahan M.D.;  Location: SURGERY [facility];  Service: General    NODE DISSECTION Right 10/27/2017    Procedure: EXCISION DYSPLASTIC NEVUS LOWER BACK;  Surgeon: Phil Mcmahan M.D.;  Location: SURGERY [facility];  Service: General    OTHER  2012    right ear lesion removed    HERNIA REPAIR  1998    OTHER ABDOMINAL SURGERY  1996    colostomy take down    OTHER ABDOMINAL SURGERY  1995    large intestine-colostomy       Physical Exam:  Vitals/ General Appearance:   Weight/BMI: Body mass index is 27.32 kg/m².  /75   Pulse 73   Temp 36.3 °C (97.4 °F) (Oral)   Resp 18   Ht 1.727 m (5' 8\")   Wt 81.5 kg (179 lb 10.8 oz)   SpO2 96%   Vitals:    12/11/22 0849 12/11/22 0900 12/11/22 0915 12/11/22 0943   BP: 133/73 135/81 132/76 139/75   Pulse: 93 91 85 73   Resp: 12 14 16 18   Temp: 36.1 °C (97 °F)  36 °C (96.8 °F) 36.3 °C (97.4 °F)   TempSrc: Temporal  Temporal Oral   SpO2: 99%   96%   Weight:       Height:         Oxygen Therapy:  Pulse Oximetry: 96 %, O2 (LPM): 0, O2 Delivery Device: None - Room Air    Physical Exam  Vitals reviewed.   Constitutional:       General: He is not in acute distress.  HENT:      Head: Normocephalic and atraumatic.   Cardiovascular:      Rate and Rhythm: Regular rhythm.      Heart sounds: Normal heart sounds. No murmur heard.    No gallop.   Pulmonary:      Comments: Normal breath sounds anteriorly.  Abdominal:      Palpations: Abdomen is soft. There is no mass.      Tenderness: There is no abdominal tenderness.   Psychiatric:         Judgment: Judgment normal.       Review of Systems:  Review of Systems   Respiratory:  Negative for cough and shortness of breath.    Cardiovascular:  Negative for chest pain.   Gastrointestinal:  Negative for abdominal pain, blood in stool, melena and vomiting.           Nelson Benjamin M.D.            "

## 2022-12-11 NOTE — OR SURGEON
EGD with biopsy  Colonoscopy with snare polypectomy    PreOp Diagnosis: GI bleeding with hematochezia and maroon stool      PostOp Diagnosis: GI bleeding probably due to a diverticulum      Procedure(s):  EGD, WITH COLONOSCOPY - Wound Class: None    Surgeon(s):  Nelson Benjamin M.D.    Anesthesiologist/Type of Anesthesia:  Anesthesiologist: Savage Smith M.D./General    Surgical Staff:  Circulator: Elinor Ramirez R.N.  Endoscopy Technician: Min Mcbride    Specimens removed if any:  ID Type Source Tests Collected by Time Destination   A : r/o h.pylori Tissue Gastric PATHOLOGY SPECIMEN Nelson Benjamin M.D. 12/11/2022  8:24 AM    B : transverse colon polyp 4mm Tissue Colon - Transverse PATHOLOGY SPECIMEN Nelson Benjamin M.D. 12/11/2022  8:35 AM        Estimated Blood Loss: Minimal    Findings:   1.  Mild gastritis  2.  Mild duodenitis in the bulb  3.  Several small gastric polyps in the body, largest was 3 mm.  4.  5 mm and 6 mm firm submucosal distal esophageal nodules at 39 cm  5.  Diverticulum at 39 cm with an adherent clot in the base, probably the source of bleeding.  6.  An endoscopic clip was placed adjacent to the diverticulum at 39 cm  7.  Status post polypectomy: 4 mm mid transverse polyp  8.  10 mm sigmoid polyp at 25 cm was left in place  9.  Diverticula throughout the colon from the sigmoid through the ascending colon.  10.  Suboptimal colon prep using Moviprep.  Use GoLytely for colonoscopy    Specimens  1.  Gastric biopsies.  Rule out H. Pylori  2.  4 mm mid transverse colon polyp    Complications: None available    EGD/COL procedure note  After signed informed consent, and induction of general anesthesia, the patient was placed in the left lateral decubitus position.  The Olympus flexible video endoscope was introduced into the oropharynx and advanced through the esophagus and stomach and into the second portion of the duodenum without difficulty.  On withdrawal of the endoscope, the  second portion of the duodenum was normal.  The bulb of the duodenum demonstrated mild duodenitis without ulcerations or erosions.  The pylorus was normal.  The gastric mucosa demonstrated mild gastritis without ulcerations or erosions.  Gastric biopsies were taken to rule out H. pylori.  There were several small gastric polyps in the gastric body, with the largest polyp about 3 mm.  The gastric mucosa was otherwise unremarkable.  The gastroesophageal junction was normal on forward and retroflexed view, and present at 41 cm.  There was a 5 mm and 6 mm firm submucosal distal esophageal nodules adjacent to each other at 39 cm.  These nodules were firm on probing with the closed biopsy forceps.  The remainder the esophageal mucosa was normal.  The patient tolerated the procedure well and there were no immediate complications.    The patient was repositioned, and digital rectal examination was unremarkable.  The Olympus pediatric flexible videocolonoscope was introduced into the rectum and advanced through the colon to the cecum with mild difficulty.  Retained stool was removed with irrigation and suctioning, but there was residual stool that could not be removed.  He had a suboptimal prep after taking Moviprep, but the prep was adequate to evaluate for the source of bleeding.  There was a 4 mm mid transverse colon polyp that was removed with cold snare polypectomy and retrieved in a suction trap.  There was a clot in a diverticulum at 39 cm, which remained adherent to the base of the diverticulum despite washing with irrigation.  This was the most likely reason for the recent bleeding.  An endoscopic clip was placed about 1 cm from the loop of the diverticulum, to allow for future localization if needed.  There was a 10 mm sigmoid polyp at 25 cm that was photographed and left in place.  Diverticula were present throughout the colon, from the sigmoid through the ascending colon.  The anus was normal on forward and  retroflexed view.  The patient tolerated the procedure well and there were no immediate complications.  He was returned to the recovery area in satisfactory condition.              12/11/2022 8:46 AM Nelson Benjamin M.D.

## 2022-12-11 NOTE — OR NURSING
0849: Patient arrived from [facility] via gurney.  ABD soft, no bleeding noted.     Sedation/Resp Status: OPA out for return of spontaneous eye opening/gag reflex - respirations continue spontaneous and non-labored.     HR 90s SR; VSS on 6L 02 via mask.     0900: Cont stable, no nausea/pain. Increasingly alert.     0915: Tolerating sips of clears with no nausea. Meets criteria to transfer back to floor room.

## 2023-02-06 ENCOUNTER — HOSPITAL ENCOUNTER (OUTPATIENT)
Facility: MEDICAL CENTER | Age: 73
End: 2023-02-08
Attending: EMERGENCY MEDICINE | Admitting: INTERNAL MEDICINE
Payer: MEDICARE

## 2023-02-06 ENCOUNTER — APPOINTMENT (OUTPATIENT)
Dept: RADIOLOGY | Facility: MEDICAL CENTER | Age: 73
End: 2023-02-06
Attending: EMERGENCY MEDICINE
Payer: MEDICARE

## 2023-02-06 DIAGNOSIS — K92.1 HEMATOCHEZIA: ICD-10-CM

## 2023-02-06 DIAGNOSIS — K92.2 LOWER GI BLEED: ICD-10-CM

## 2023-02-06 LAB
ABO GROUP BLD: NORMAL
ALBUMIN SERPL BCP-MCNC: 4.4 G/DL (ref 3.2–4.9)
ALBUMIN/GLOB SERPL: 1.6 G/DL
ALP SERPL-CCNC: 130 U/L (ref 30–99)
ALT SERPL-CCNC: 26 U/L (ref 2–50)
ANION GAP SERPL CALC-SCNC: 12 MMOL/L (ref 7–16)
APTT PPP: 29.5 SEC (ref 24.7–36)
AST SERPL-CCNC: 22 U/L (ref 12–45)
BASOPHILS # BLD AUTO: 0.9 % (ref 0–1.8)
BASOPHILS # BLD: 0.08 K/UL (ref 0–0.12)
BILIRUB SERPL-MCNC: 0.3 MG/DL (ref 0.1–1.5)
BLD GP AB SCN SERPL QL: NORMAL
BUN SERPL-MCNC: 22 MG/DL (ref 8–22)
CALCIUM ALBUM COR SERPL-MCNC: 9.1 MG/DL (ref 8.5–10.5)
CALCIUM SERPL-MCNC: 9.4 MG/DL (ref 8.4–10.2)
CHLORIDE SERPL-SCNC: 104 MMOL/L (ref 96–112)
CO2 SERPL-SCNC: 23 MMOL/L (ref 20–33)
CREAT SERPL-MCNC: 0.58 MG/DL (ref 0.5–1.4)
EKG IMPRESSION: NORMAL
EOSINOPHIL # BLD AUTO: 0.28 K/UL (ref 0–0.51)
EOSINOPHIL NFR BLD: 3.1 % (ref 0–6.9)
ERYTHROCYTE [DISTWIDTH] IN BLOOD BY AUTOMATED COUNT: 43.4 FL (ref 35.9–50)
GFR SERPLBLD CREATININE-BSD FMLA CKD-EPI: 105 ML/MIN/1.73 M 2
GLOBULIN SER CALC-MCNC: 2.7 G/DL (ref 1.9–3.5)
GLUCOSE SERPL-MCNC: 95 MG/DL (ref 65–99)
HCT VFR BLD AUTO: 41.3 % (ref 42–52)
HGB BLD-MCNC: 14.5 G/DL (ref 14–18)
IMM GRANULOCYTES # BLD AUTO: 0.03 K/UL (ref 0–0.11)
IMM GRANULOCYTES NFR BLD AUTO: 0.3 % (ref 0–0.9)
INR PPP: 1.01 (ref 0.87–1.13)
LYMPHOCYTES # BLD AUTO: 1.27 K/UL (ref 1–4.8)
LYMPHOCYTES NFR BLD: 14 % (ref 22–41)
MCH RBC QN AUTO: 34.4 PG (ref 27–33)
MCHC RBC AUTO-ENTMCNC: 35.1 G/DL (ref 33.7–35.3)
MCV RBC AUTO: 97.9 FL (ref 81.4–97.8)
MONOCYTES # BLD AUTO: 0.68 K/UL (ref 0–0.85)
MONOCYTES NFR BLD AUTO: 7.5 % (ref 0–13.4)
NEUTROPHILS # BLD AUTO: 6.72 K/UL (ref 1.82–7.42)
NEUTROPHILS NFR BLD: 74.2 % (ref 44–72)
NRBC # BLD AUTO: 0 K/UL
NRBC BLD-RTO: 0 /100 WBC
PLATELET # BLD AUTO: 270 K/UL (ref 164–446)
PMV BLD AUTO: 9.1 FL (ref 9–12.9)
POTASSIUM SERPL-SCNC: 3.9 MMOL/L (ref 3.6–5.5)
PROT SERPL-MCNC: 7.1 G/DL (ref 6–8.2)
PROTHROMBIN TIME: 13.2 SEC (ref 12–14.6)
RBC # BLD AUTO: 4.22 M/UL (ref 4.7–6.1)
RH BLD: NORMAL
SODIUM SERPL-SCNC: 139 MMOL/L (ref 135–145)
WBC # BLD AUTO: 9.1 K/UL (ref 4.8–10.8)

## 2023-02-06 PROCEDURE — 80053 COMPREHEN METABOLIC PANEL: CPT

## 2023-02-06 PROCEDURE — 85025 COMPLETE CBC W/AUTO DIFF WBC: CPT

## 2023-02-06 PROCEDURE — 36415 COLL VENOUS BLD VENIPUNCTURE: CPT

## 2023-02-06 PROCEDURE — 85730 THROMBOPLASTIN TIME PARTIAL: CPT

## 2023-02-06 PROCEDURE — 74174 CTA ABD&PLVS W/CONTRAST: CPT

## 2023-02-06 PROCEDURE — 86850 RBC ANTIBODY SCREEN: CPT

## 2023-02-06 PROCEDURE — 93005 ELECTROCARDIOGRAM TRACING: CPT | Performed by: EMERGENCY MEDICINE

## 2023-02-06 PROCEDURE — 99285 EMERGENCY DEPT VISIT HI MDM: CPT

## 2023-02-06 PROCEDURE — 700117 HCHG RX CONTRAST REV CODE 255: Performed by: EMERGENCY MEDICINE

## 2023-02-06 PROCEDURE — 86900 BLOOD TYPING SEROLOGIC ABO: CPT

## 2023-02-06 PROCEDURE — 86901 BLOOD TYPING SEROLOGIC RH(D): CPT

## 2023-02-06 PROCEDURE — G0378 HOSPITAL OBSERVATION PER HR: HCPCS

## 2023-02-06 PROCEDURE — 85610 PROTHROMBIN TIME: CPT

## 2023-02-06 PROCEDURE — 99223 1ST HOSP IP/OBS HIGH 75: CPT | Performed by: INTERNAL MEDICINE

## 2023-02-06 RX ORDER — SODIUM CHLORIDE 9 MG/ML
INJECTION, SOLUTION INTRAVENOUS CONTINUOUS
Status: DISCONTINUED | OUTPATIENT
Start: 2023-02-07 | End: 2023-02-08 | Stop reason: HOSPADM

## 2023-02-06 RX ORDER — POLYETHYLENE GLYCOL 3350 17 G/17G
1 POWDER, FOR SOLUTION ORAL
Status: DISCONTINUED | OUTPATIENT
Start: 2023-02-06 | End: 2023-02-08 | Stop reason: HOSPADM

## 2023-02-06 RX ORDER — BISACODYL 10 MG
10 SUPPOSITORY, RECTAL RECTAL
Status: DISCONTINUED | OUTPATIENT
Start: 2023-02-06 | End: 2023-02-08 | Stop reason: HOSPADM

## 2023-02-06 RX ORDER — AMOXICILLIN 250 MG
2 CAPSULE ORAL 2 TIMES DAILY
Status: DISCONTINUED | OUTPATIENT
Start: 2023-02-07 | End: 2023-02-08 | Stop reason: HOSPADM

## 2023-02-06 RX ORDER — VALSARTAN 80 MG/1
160 TABLET ORAL DAILY
Status: DISCONTINUED | OUTPATIENT
Start: 2023-02-07 | End: 2023-02-08 | Stop reason: HOSPADM

## 2023-02-06 RX ORDER — ONDANSETRON 4 MG/1
4 TABLET, ORALLY DISINTEGRATING ORAL EVERY 4 HOURS PRN
Status: DISCONTINUED | OUTPATIENT
Start: 2023-02-06 | End: 2023-02-08 | Stop reason: HOSPADM

## 2023-02-06 RX ORDER — ACETAMINOPHEN 325 MG/1
650 TABLET ORAL EVERY 6 HOURS PRN
Status: DISCONTINUED | OUTPATIENT
Start: 2023-02-06 | End: 2023-02-08 | Stop reason: HOSPADM

## 2023-02-06 RX ORDER — LABETALOL HYDROCHLORIDE 5 MG/ML
10 INJECTION, SOLUTION INTRAVENOUS EVERY 4 HOURS PRN
Status: DISCONTINUED | OUTPATIENT
Start: 2023-02-06 | End: 2023-02-08 | Stop reason: HOSPADM

## 2023-02-06 RX ORDER — ONDANSETRON 2 MG/ML
4 INJECTION INTRAMUSCULAR; INTRAVENOUS EVERY 4 HOURS PRN
Status: DISCONTINUED | OUTPATIENT
Start: 2023-02-06 | End: 2023-02-08 | Stop reason: HOSPADM

## 2023-02-06 RX ADMIN — IOHEXOL 100 ML: 350 INJECTION, SOLUTION INTRAVENOUS at 21:29

## 2023-02-06 ASSESSMENT — ENCOUNTER SYMPTOMS
FEVER: 0
SPUTUM PRODUCTION: 0
CHILLS: 0
VOMITING: 0
STRIDOR: 0
FALLS: 0
PALPITATIONS: 0
ABDOMINAL PAIN: 0
WEAKNESS: 0
HEADACHES: 0
LOSS OF CONSCIOUSNESS: 0
DEPRESSION: 0
DIZZINESS: 0
DIARRHEA: 0
SHORTNESS OF BREATH: 0
TINGLING: 0
BLOOD IN STOOL: 1
COUGH: 0
CONSTIPATION: 0
MYALGIAS: 0
NAUSEA: 0

## 2023-02-06 ASSESSMENT — LIFESTYLE VARIABLES
EVER FELT BAD OR GUILTY ABOUT YOUR DRINKING: NO
EVER HAD A DRINK FIRST THING IN THE MORNING TO STEADY YOUR NERVES TO GET RID OF A HANGOVER: NO
HAVE PEOPLE ANNOYED YOU BY CRITICIZING YOUR DRINKING: NO
HAVE YOU EVER FELT YOU SHOULD CUT DOWN ON YOUR DRINKING: NO
TOTAL SCORE: 0
DO YOU DRINK ALCOHOL: NO
TOTAL SCORE: 0
TOTAL SCORE: 0
CONSUMPTION TOTAL: INCOMPLETE

## 2023-02-06 ASSESSMENT — FIBROSIS 4 INDEX: FIB4 SCORE: 1.34

## 2023-02-07 PROBLEM — E86.0 DEHYDRATION: Status: ACTIVE | Noted: 2023-02-07

## 2023-02-07 PROBLEM — R74.8 ELEVATED ALKALINE PHOSPHATASE LEVEL: Status: ACTIVE | Noted: 2023-02-07

## 2023-02-07 LAB
ANION GAP SERPL CALC-SCNC: 12 MMOL/L (ref 7–16)
BUN SERPL-MCNC: 20 MG/DL (ref 8–22)
CALCIUM SERPL-MCNC: 8.6 MG/DL (ref 8.4–10.2)
CHLORIDE SERPL-SCNC: 106 MMOL/L (ref 96–112)
CO2 SERPL-SCNC: 22 MMOL/L (ref 20–33)
CREAT SERPL-MCNC: 0.59 MG/DL (ref 0.5–1.4)
ERYTHROCYTE [DISTWIDTH] IN BLOOD BY AUTOMATED COUNT: 44 FL (ref 35.9–50)
GFR SERPLBLD CREATININE-BSD FMLA CKD-EPI: 104 ML/MIN/1.73 M 2
GLUCOSE SERPL-MCNC: 105 MG/DL (ref 65–99)
HCT VFR BLD AUTO: 37 % (ref 42–52)
HGB BLD-MCNC: 12.1 G/DL (ref 14–18)
HGB BLD-MCNC: 12.7 G/DL (ref 14–18)
HGB BLD-MCNC: 12.8 G/DL (ref 14–18)
MCH RBC QN AUTO: 33.9 PG (ref 27–33)
MCHC RBC AUTO-ENTMCNC: 34.3 G/DL (ref 33.7–35.3)
MCV RBC AUTO: 98.7 FL (ref 81.4–97.8)
PLATELET # BLD AUTO: 257 K/UL (ref 164–446)
PMV BLD AUTO: 9.4 FL (ref 9–12.9)
POTASSIUM SERPL-SCNC: 3.7 MMOL/L (ref 3.6–5.5)
RBC # BLD AUTO: 3.75 M/UL (ref 4.7–6.1)
SODIUM SERPL-SCNC: 140 MMOL/L (ref 135–145)
WBC # BLD AUTO: 8.1 K/UL (ref 4.8–10.8)

## 2023-02-07 PROCEDURE — G0378 HOSPITAL OBSERVATION PER HR: HCPCS

## 2023-02-07 PROCEDURE — 94760 N-INVAS EAR/PLS OXIMETRY 1: CPT

## 2023-02-07 PROCEDURE — 85018 HEMOGLOBIN: CPT

## 2023-02-07 PROCEDURE — 85027 COMPLETE CBC AUTOMATED: CPT

## 2023-02-07 PROCEDURE — 80048 BASIC METABOLIC PNL TOTAL CA: CPT

## 2023-02-07 PROCEDURE — 36415 COLL VENOUS BLD VENIPUNCTURE: CPT

## 2023-02-07 PROCEDURE — A9270 NON-COVERED ITEM OR SERVICE: HCPCS | Performed by: NURSE PRACTITIONER

## 2023-02-07 PROCEDURE — A9270 NON-COVERED ITEM OR SERVICE: HCPCS | Performed by: INTERNAL MEDICINE

## 2023-02-07 PROCEDURE — 700102 HCHG RX REV CODE 250 W/ 637 OVERRIDE(OP): Performed by: NURSE PRACTITIONER

## 2023-02-07 PROCEDURE — 700102 HCHG RX REV CODE 250 W/ 637 OVERRIDE(OP): Performed by: INTERNAL MEDICINE

## 2023-02-07 PROCEDURE — 700105 HCHG RX REV CODE 258: Performed by: INTERNAL MEDICINE

## 2023-02-07 PROCEDURE — 99232 SBSQ HOSP IP/OBS MODERATE 35: CPT | Performed by: INTERNAL MEDICINE

## 2023-02-07 RX ORDER — PEG-3350, SODIUM SULFATE, SODIUM CHLORIDE, POTASSIUM CHLORIDE, SODIUM ASCORBATE AND ASCORBIC ACID 7.5-2.691G
100 KIT ORAL 2 TIMES DAILY
Status: COMPLETED | OUTPATIENT
Start: 2023-02-07 | End: 2023-02-07

## 2023-02-07 RX ADMIN — SODIUM CHLORIDE: 9 INJECTION, SOLUTION INTRAVENOUS at 00:35

## 2023-02-07 RX ADMIN — PEG-3350, SODIUM SULFATE, SODIUM CHLORIDE, POTASSIUM CHLORIDE, SODIUM ASCORBATE AND ASCORBIC ACID 100 G: KIT at 14:24

## 2023-02-07 RX ADMIN — SODIUM CHLORIDE: 9 INJECTION, SOLUTION INTRAVENOUS at 10:39

## 2023-02-07 RX ADMIN — PEG-3350, SODIUM SULFATE, SODIUM CHLORIDE, POTASSIUM CHLORIDE, SODIUM ASCORBATE AND ASCORBIC ACID 100 G: KIT at 18:16

## 2023-02-07 RX ADMIN — VALSARTAN 160 MG: 80 TABLET ORAL at 05:07

## 2023-02-07 ASSESSMENT — COGNITIVE AND FUNCTIONAL STATUS - GENERAL
MOBILITY SCORE: 24
SUGGESTED CMS G CODE MODIFIER MOBILITY: CH
DAILY ACTIVITIY SCORE: 24
SUGGESTED CMS G CODE MODIFIER DAILY ACTIVITY: CH

## 2023-02-07 ASSESSMENT — LIFESTYLE VARIABLES
TOTAL SCORE: 0
EVER FELT BAD OR GUILTY ABOUT YOUR DRINKING: NO
HAVE YOU EVER FELT YOU SHOULD CUT DOWN ON YOUR DRINKING: NO
HOW MANY TIMES IN THE PAST YEAR HAVE YOU HAD 5 OR MORE DRINKS IN A DAY: 0
AVERAGE NUMBER OF DAYS PER WEEK YOU HAVE A DRINK CONTAINING ALCOHOL: 3
HAVE PEOPLE ANNOYED YOU BY CRITICIZING YOUR DRINKING: NO
ALCOHOL_USE: YES
EVER HAD A DRINK FIRST THING IN THE MORNING TO STEADY YOUR NERVES TO GET RID OF A HANGOVER: NO
TOTAL SCORE: 0
ON A TYPICAL DAY WHEN YOU DRINK ALCOHOL HOW MANY DRINKS DO YOU HAVE: 1
CONSUMPTION TOTAL: NEGATIVE
TOTAL SCORE: 0

## 2023-02-07 ASSESSMENT — PAIN DESCRIPTION - PAIN TYPE
TYPE: ACUTE PAIN

## 2023-02-07 ASSESSMENT — PATIENT HEALTH QUESTIONNAIRE - PHQ9
SUM OF ALL RESPONSES TO PHQ9 QUESTIONS 1 AND 2: 0
2. FEELING DOWN, DEPRESSED, IRRITABLE, OR HOPELESS: NOT AT ALL
1. LITTLE INTEREST OR PLEASURE IN DOING THINGS: NOT AT ALL

## 2023-02-07 ASSESSMENT — FIBROSIS 4 INDEX
FIB4 SCORE: 1.12
FIB4 SCORE: 1.12

## 2023-02-07 ASSESSMENT — ENCOUNTER SYMPTOMS
CONSTITUTIONAL NEGATIVE: 1
EYES NEGATIVE: 1
BLOOD IN STOOL: 1
CARDIOVASCULAR NEGATIVE: 1
MUSCULOSKELETAL NEGATIVE: 1
PSYCHIATRIC NEGATIVE: 1
RESPIRATORY NEGATIVE: 1
NEUROLOGICAL NEGATIVE: 1

## 2023-02-07 NOTE — ED PROVIDER NOTES
ED Provider Note    CHIEF COMPLAINT  Chief Complaint   Patient presents with    Bloody Stools     X 1 episode, was seen in December for same thing       EXTERNAL RECORDS REVIEWED  Inpatient Notes colonoscopy by Dr Benjamin 12/9/2022 Findings:   1.  Mild gastritis  2.  Mild duodenitis in the bulb  3.  Several small gastric polyps in the body, largest was 3 mm.  4.  5 mm and 6 mm firm submucosal distal esophageal nodules at 39 cm  5.  Diverticulum at 39 cm with an adherent clot in the base, probably the source of bleeding.  6.  An endoscopic clip was placed adjacent to the diverticulum at 39 cm  7.  Status post polypectomy: 4 mm mid transverse polyp  8.  10 mm sigmoid polyp at 25 cm was left in place  9.  Diverticula throughout the colon from the sigmoid through the ascending colon.  10.  Suboptimal colon prep using Moviprep.  Use GoLytely for colonoscopy    HPI/ROS  LIMITATION TO HISTORY   Select: : None  OUTSIDE HISTORIAN(S):  Significant other wife    Luis Angel Sanchez is a 72 y.o. male who presents after having 2 episodes of melanotic bloody bowel movements 2 hours apart.  The first 1 was around 5:00 the second 1 around 7 PM today.  The patient states that was no stool it was just straight blood.  Estimates more than a cup of blood loss in each soda.  He denies any abdominal pain.  He denies any dizziness or lightheadedness.  Patient states that he was seen for the same thing and admitted to the hospital in December of last year and saw GI consultants Dr. Benjamin.  They suspected that the bleed was due to a diverticulum.  He is scheduled to see Dr. Benjamin in follow-up next month polyp removal.  He states that since his previous visit he has not had any further bleeding up until today.  The patient states that he has been taking a small amount of prednisone over the last 2 weeks for arthritis.  He denies any NSAID use.  He has no dizziness or lightheadedness chest pains or shortness of breath.  Does not take blood  "thinning medications.    PAST MEDICAL HISTORY   has a past medical history of Cancer (HCC) (10/24/2017) and Hypertension.    SURGICAL HISTORY   has a past surgical history that includes other (); wide excision (Right, 10/27/2017); node dissection (Right, 10/27/2017); node biopsy sentinel (Bilateral, 2018); hernia repair (); other abdominal surgery (); other abdominal surgery (); vitrectomy posterior (Left, 2019); and panendoscopy (N/A, 2022).    FAMILY HISTORY  No family history on file.    SOCIAL HISTORY  Social History     Tobacco Use    Smoking status: Former     Types: Cigarettes     Quit date: 1995     Years since quittin.1    Smokeless tobacco: Never   Substance and Sexual Activity    Alcohol use: Yes     Comment: 2 per week     Drug use: No    Sexual activity: Not on file       CURRENT MEDICATIONS  Home Medications       Reviewed by Mae Hollingsworth R.N. (Registered Nurse) on 23 at 1819  Med List Status: Not Addressed     Medication Last Dose Status   Ascorbic Acid (VITAMIN C PO)  Active   Multiple Vitamins-Minerals (ZINC PO)  Active   NON SPECIFIED  Active   valsartan (DIOVAN) 160 MG Tab  Active   VITAMIN E PO  Active                    ALLERGIES  Allergies   Allergen Reactions    Morphine Nausea       PHYSICAL EXAM  VITAL SIGNS: BP (!) 156/87   Pulse 81   Temp 36.4 °C (97.5 °F)   Resp 16   Ht 1.727 m (5' 8\")   Wt 89.2 kg (196 lb 10.4 oz)   SpO2 96%   BMI 29.90 kg/m²      Constitutional: Well developed, Well nourished, No acute distress, Non-toxic appearance.   HEENT: Normocephalic, Atraumatic,  external ears normal, pharynx pink,  Mucous  Membranes moist, No rhinorrhea or mucosal edema  Eyes: PERRL, EOMI, Conjunctiva normal, No discharge.   Neck: Normal range of motion, No tenderness, Supple, No stridor.   Lymphatic: No lymphadenopathy    Cardiovascular: Regular Rate and Rhythm, No murmurs,  rubs, or gallops.   Thorax & Lungs: Lungs clear to " auscultation bilaterally, No respiratory distress, No wheezes, rhales or rhonchi, No chest wall tenderness.   Abdomen: Bowel sounds normal, Soft, non tender, non distended,  No pulsatile masses., no rebound guarding or peritoneal signs.   Skin: Warm, Dry, No erythema, No rash,   Back:  No CVA tenderness,  No spinal tenderness, bony crepitance step offs or instability.   Extremities: Equal, intact distal pulses, No cyanosis, clubbing or edema,  No tenderness.   Musculoskeletal: Good range of motion in all major joints. No tenderness to palpation or major deformities noted.   Neurologic: Alert & oriented x 3,   No focal deficits noted.   Psychiatric: Affect normal, Judgment normal, Mood normal.     DIAGNOSTIC STUDIES / PROCEDURES  EKG  I have independently interpreted this EKG  See below    LABS  Results for orders placed or performed during the hospital encounter of 02/06/23   CBC WITH DIFFERENTIAL   Result Value Ref Range    WBC 9.1 4.8 - 10.8 K/uL    RBC 4.22 (L) 4.70 - 6.10 M/uL    Hemoglobin 14.5 14.0 - 18.0 g/dL    Hematocrit 41.3 (L) 42.0 - 52.0 %    MCV 97.9 (H) 81.4 - 97.8 fL    MCH 34.4 (H) 27.0 - 33.0 pg    MCHC 35.1 33.7 - 35.3 g/dL    RDW 43.4 35.9 - 50.0 fL    Platelet Count 270 164 - 446 K/uL    MPV 9.1 9.0 - 12.9 fL    Neutrophils-Polys 74.20 (H) 44.00 - 72.00 %    Lymphocytes 14.00 (L) 22.00 - 41.00 %    Monocytes 7.50 0.00 - 13.40 %    Eosinophils 3.10 0.00 - 6.90 %    Basophils 0.90 0.00 - 1.80 %    Immature Granulocytes 0.30 0.00 - 0.90 %    Nucleated RBC 0.00 /100 WBC    Neutrophils (Absolute) 6.72 1.82 - 7.42 K/uL    Lymphs (Absolute) 1.27 1.00 - 4.80 K/uL    Monos (Absolute) 0.68 0.00 - 0.85 K/uL    Eos (Absolute) 0.28 0.00 - 0.51 K/uL    Baso (Absolute) 0.08 0.00 - 0.12 K/uL    Immature Granulocytes (abs) 0.03 0.00 - 0.11 K/uL    NRBC (Absolute) 0.00 K/uL   COMP METABOLIC PANEL   Result Value Ref Range    Sodium 139 135 - 145 mmol/L    Potassium 3.9 3.6 - 5.5 mmol/L    Chloride 104 96 - 112  mmol/L    Co2 23 20 - 33 mmol/L    Anion Gap 12.0 7.0 - 16.0    Glucose 95 65 - 99 mg/dL    Bun 22 8 - 22 mg/dL    Creatinine 0.58 0.50 - 1.40 mg/dL    Calcium 9.4 8.4 - 10.2 mg/dL    AST(SGOT) 22 12 - 45 U/L    ALT(SGPT) 26 2 - 50 U/L    Alkaline Phosphatase 130 (H) 30 - 99 U/L    Total Bilirubin 0.3 0.1 - 1.5 mg/dL    Albumin 4.4 3.2 - 4.9 g/dL    Total Protein 7.1 6.0 - 8.2 g/dL    Globulin 2.7 1.9 - 3.5 g/dL    A-G Ratio 1.6 g/dL   PROTHROMBIN TIME (INR)   Result Value Ref Range    PT 13.2 12.0 - 14.6 sec    INR 1.01 0.87 - 1.13   APTT   Result Value Ref Range    APTT 29.5 24.7 - 36.0 sec   COD (ADULT)   Result Value Ref Range    ABO Grouping Only O     Rh Grouping Only POS     Antibody Screen-Cod NEG    CORRECTED CALCIUM   Result Value Ref Range    Correct Calcium 9.1 8.5 - 10.5 mg/dL   ESTIMATED GFR   Result Value Ref Range    GFR (CKD-EPI) 105 >60 mL/min/1.73 m 2   EKG (NOW)   Result Value Ref Range    Report       [facility] Emergency Dept.    Test Date:  2023  Pt Name:    BETTE ARSHAD                Department: [facility]  MRN:        [id number]                      [facility]  Gender:     Male                         Technician: KALEN  :        1950                   Requested By:MANUEL VELASQUEZ  Order #:    [id number]                    Reading MD: MANUEL VELASQUEZ MD    Measurements  Intervals                                Axis  Rate:       70                           P:          -26  WA:         185                          QRS:        13  QRSD:       89                           T:          9  QT:         372  QTc:        402    Interpretive Statements  Sinus rhythm  Abnormal R-wave progression, early transition  Borderline repolarization abnormality  Compared to ECG 2022 16:25:00  No significant changes  Electronically Signed On 2023 21:17:52 PST by MANUEL VELASQUEZ MD          RADIOLOGY    Radiologist interpretation:  CTA ABDOMEN PELVIS W & W/O POST PROCESS    Final Result         1.  No visualized aneurysm or dissection.   2.  No intraluminal contrast identified to indicate site of GI bleed.   3.  Large right lower pole renal cyst with peripheral calcification, overall stable since prior study.   4.  Enlarged prostate, workup and evaluation for causes of prostate enlargement recommended as clinically appropriate.   5.  Symmetrically thickened bilateral adrenal glands, consider adrenal hyperplasia.   6.  Fat-containing bilateral inguinal hernias   7.  Diverticulosis   8.  Hepatomegaly            COURSE & MEDICAL DECISION MAKING    ED Observation Status? No; Patient does not meet criteria for ED Observation.     INITIAL ASSESSMENT, COURSE AND PLAN  Care Narrative: This is a 72-year-old male with 2 episodes of more than a cup each of dark melena that started this evening around 5:00pm.  He has had this happen before December and saw GI consultants Dr. Benjamin in the hospital who suspected he had bleeding from a diverticulum.  The patient did not feel dizzy or lightheaded and his vital signs are stable.  He does not appear anemic or in distress at this time.  I have ordered a type and screen as well as CBC CMP and coagulation studies.  CTA of the abdomen pelvis was also ordered for evaluation of active bleeding.     Differential diagnosis: Diverticular bleeding, anemia  ADDITIONAL PROBLEM LIST  Arthritis  Colonic perforation in 1994 repaired with a colostomy and reversal by Dr. Tapia  DISPOSITION AND DISCUSSIONS      10:22 PM the patient CBC hemoglobin of 14.5 hematocrit 41.3 with platelets of 270 which is normal.  His white count is normal at 9.1.  Apprehensive metabolic panel reveals normal electrolytes normal liver function test BUN 22 creatinine 0.58.  Alk phos is elevated at 130.  Patient's PT 13.2 INR is 1.01 PTT 29.5.  CTA does not show any active hemorrhage in the GI tract.  The patient has remained hemodynamically stable in the emergency department.  He will  be admitted to the hospitalist service on telemetry for further evaluation of his symptoms and consult to GI and possibly surgery.  The patient understands his need for admission further work-up and care.      I have discussed management of the patient with the following physicians and NATY's: Hospitalist  for admission to the hospital for serial hemoglobin monitoring GI consultants  who will  evaluate the patient    Discussion of management with other QHP or appropriate source(s): None     Escalation of care considered, and ultimately not performed: None    Barriers to care at this time, including but not limited to: None.     Decision tools and prescription drugs considered including, but not limited to:  none .      Patient will be admitted to the hospital in guarded condition    FINAL DIAGNOSIS  1. Lower GI bleed    2. Hematochezia           Electronically signed by: Kimberly Yao M.D., 2/6/2023 7:44 PM

## 2023-02-07 NOTE — PROGRESS NOTES
"Pt brought from ED to rm 312-1. Ambulated from gurney to bathroom, had a bowel movement with blood in it. Pt stated the bowel movement was \"just like before\". Pt A&Ox4, no complaints of pain, numbness or tingling. Pt placed on tele monitoring and assessed, see flowsheet, Pt updated on POC. This RN called Pt's wife, Dorina, and updated her as well. Bed in lowest and locked position, call light within reach, all safety measures in place.   "

## 2023-02-07 NOTE — ASSESSMENT & PLAN NOTE
- Likely from dehydration.  Transaminases and bilirubin are normal.  -Continue IV fluids.  -Repeat CMP tomorrow.

## 2023-02-07 NOTE — PROGRESS NOTES
Hospital Medicine Daily Progress Note    Date of Service  2/7/2023    Chief Complaint  Bloody stools    Hospital Course  Luis Angel Sanchez is a 72 y.o. male with hypertension, history of GI bleed due to diverticular bleed late last year for which he underwent colonoscopy with endoscopic clip intervention, admitted 2/6/2023 with bloody stools.  On evaluation, vital signs were stable.  Hemoglobin was stable at 14.5.  Electrolytes and renal function are normal.  CT of the abdomen and pelvis showed no intraluminal contrast identified to indicate site of GI bleed.  GI was consulted.  He was felt to be dehydrated.  He was started on IV fluids.    Interval Problem Update  2/7/2023 - I reviewed the patient's chart. There were no significant overnight events. Remains hemodynamically stable and afebrile. Stable on RA.  Hemoglobin 12.7.  Electrolytes and renal function are normal.    > I have personally seen and examined the patient today.  He had no further hematochezia since 1 AM last night.  Denies any abdominal pain.  No nausea or vomiting.  No chest pain or shortness of breath      I have discussed this patient's plan of care and discharge plan at IDT rounds today with Case Management, Nursing, Nursing leadership, and other members of the IDT team.    Consultants/Specialty  GI    Code Status  Full Code    Disposition  Patient is not medically cleared for discharge.   Anticipate discharge to to home with close outpatient follow-up.  I have placed the appropriate orders for post-discharge needs.    Review of Systems  ROS     Pertinent positives/negatives as mentioned above.     A complete review of systems was personally done by me. All other systems were negative.       Physical Exam  Temp:  [36.3 °C (97.3 °F)-36.7 °C (98.1 °F)] 36.4 °C (97.5 °F)  Pulse:  [66-92] 70  Resp:  [16-18] 18  BP: (118-172)/() 142/71  SpO2:  [93 %-100 %] 93 %    Physical Exam  Vitals reviewed.   Constitutional:       General: He is not in acute  distress.     Appearance: Normal appearance. He is not toxic-appearing or diaphoretic.   HENT:      Head: Normocephalic and atraumatic.      Right Ear: External ear normal.      Left Ear: External ear normal.      Mouth/Throat:      Mouth: Mucous membranes are moist.      Pharynx: No oropharyngeal exudate.   Eyes:      General: No scleral icterus.     Extraocular Movements: Extraocular movements intact.      Conjunctiva/sclera: Conjunctivae normal.      Pupils: Pupils are equal, round, and reactive to light.   Cardiovascular:      Rate and Rhythm: Normal rate and regular rhythm.      Heart sounds: Normal heart sounds. No murmur heard.    No gallop.   Pulmonary:      Effort: Pulmonary effort is normal. No respiratory distress.      Breath sounds: Normal breath sounds. No stridor. No wheezing, rhonchi or rales.   Chest:      Chest wall: No tenderness.   Abdominal:      General: Bowel sounds are normal. There is no distension.      Palpations: Abdomen is soft. There is no mass.      Tenderness: There is no abdominal tenderness. There is no guarding or rebound.   Musculoskeletal:         General: No swelling. Normal range of motion.      Cervical back: Normal range of motion and neck supple.      Right lower leg: No edema.      Left lower leg: No edema.   Lymphadenopathy:      Cervical: No cervical adenopathy.   Skin:     General: Skin is warm and dry.      Coloration: Skin is not jaundiced.      Findings: No rash.   Neurological:      General: No focal deficit present.      Mental Status: He is alert and oriented to person, place, and time.      Cranial Nerves: No cranial nerve deficit.   Psychiatric:         Mood and Affect: Mood normal.         Behavior: Behavior normal.         Thought Content: Thought content normal.         Judgment: Judgment normal.       Fluids    Intake/Output Summary (Last 24 hours) at 2/7/2023 1212  Last data filed at 2/7/2023 1144  Gross per 24 hour   Intake 120 ml   Output 375 ml   Net -255  ml       Laboratory  Recent Labs     02/06/23 2002 02/07/23  0108 02/07/23  0903   WBC 9.1 8.1  --    RBC 4.22* 3.75*  --    HEMOGLOBIN 14.5 12.7* 12.1*   HEMATOCRIT 41.3* 37.0*  --    MCV 97.9* 98.7*  --    MCH 34.4* 33.9*  --    MCHC 35.1 34.3  --    RDW 43.4 44.0  --    PLATELETCT 270 257  --    MPV 9.1 9.4  --      Recent Labs     02/06/23 2002 02/07/23  0108   SODIUM 139 140   POTASSIUM 3.9 3.7   CHLORIDE 104 106   CO2 23 22   GLUCOSE 95 105*   BUN 22 20   CREATININE 0.58 0.59   CALCIUM 9.4 8.6     Recent Labs     02/06/23 2002   APTT 29.5   INR 1.01               Imaging  CTA ABDOMEN PELVIS W & W/O POST PROCESS   Final Result         1.  No visualized aneurysm or dissection.   2.  No intraluminal contrast identified to indicate site of GI bleed.   3.  Large right lower pole renal cyst with peripheral calcification, overall stable since prior study.   4.  Enlarged prostate, workup and evaluation for causes of prostate enlargement recommended as clinically appropriate.   5.  Symmetrically thickened bilateral adrenal glands, consider adrenal hyperplasia.   6.  Fat-containing bilateral inguinal hernias   7.  Diverticulosis   8.  Hepatomegaly           Assessment/Plan  * Acute GI bleeding- (present on admission)  Assessment & Plan  - Suspect this is recurrent diverticular bleed.  -GI on board, plan for colonoscopy tomorrow.  -Hemoglobin trended down to 12.7, but likely due to hemodilution from IV fluids as dehydrated/hemoconcentrated on arrival.  Continue to trend hemoglobin every 8 hours, and monitor for recurrent rectal bleeding.  -Close hemodynamic monitoring.    Dehydration- (present on admission)  Assessment & Plan  - Continue IV fluids with NS at 100 cc/h.  -BMP in the morning.    Elevated alkaline phosphatase level- (present on admission)  Assessment & Plan  - Likely from dehydration.  Transaminases and bilirubin are normal.  -Continue IV fluids.  -Repeat CMP tomorrow.    Primary hypertension- (present on  admission)  Assessment & Plan  - Maintaining good control.  Continue home valsartan.  Monitor blood pressure trend closely with as needed IV labetalol for significant hypertension.         VTE prophylaxis: SCDs/TEDs and pharmacologic prophylaxis contraindicated due to GI bleed

## 2023-02-07 NOTE — PROGRESS NOTES
12-hour chart check complete.    Monitor Summary  Rhythm: SR  Rate: 76-81  Ectopy: none  Measurements: 0.18/0.10/0.38

## 2023-02-07 NOTE — DISCHARGE PLANNING
"[facility] TCN chart review completed. Collaborated with ALYSSA Reeves prior to meeting with the patient. The most current review of medical record, knowledge of pt's PLOF and social support, LACE+ score of 76, 6 clicks scores of 24 ADL and 24 mobility were considered.      TCN met with patient at bedside. Introduced self and TCN program. Provided education regarding post acute levels of care. Discussed [facility] plan benefits (Meds to Beds, medical uber and [facility] transitional care). Patient verbalized understanding.    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     As patient is at his baseline level of function, supported by his current 6 click scores of ADL and mobility, no provider consults or choice indicated at this time. Discussed transitional care through [facility] with patient stating preference for outpatient follow ups, noting his goal to \"get back to work as soon as I am out of here.\"     TCN will continue to follow and collaborate with discharge planning team should additional post acute needs arise; although no further TCN needs anticipated during patient current hospital admission. Thank you.     Completed today  Choice obtained: none indicated at this time (see above)  [facility] referral not sent; patient declined stating preference for outpatient follow ups  "

## 2023-02-07 NOTE — CONSULTS
"Gastroenterology Consult Note:    CORINNE Villareal  Date & Time note created:    2/7/2023   9:04 AM     Referring MD:  Dr. Rayshawn Sutherland    Patient ID:  Name:             Luis Angel Sanchez     YOB: 1950  Age:                 72 y.o.  male   MRN:               [id number]                                                             Reason for Consult:      Painless hematochezia    History of Present Illness:    This is a 73 yo male PMH HTN, temporary colostomy (1994) from possible bowel perforation, recent diverticular bleed (Dec. 2022) who was admitted to the hospital with 3 episodes of painless hematochezia starting last night. VSS. ED Labs: 14.5-->12.7. BUN/CRT: WNL.  CTA abdomen/pelvis negative for acute GI bleed. Denies fevers, chills, N/V, abdominal pain, changes in bowel habits, melena.    Similar episode Dec. 2022 requiring hospitalization. EGD/colonoscopy performed by Dr. Benjamin 12/11/2022: Mild gastritis/duodenitis. Diverticulum at 39 cm with an adherent clot in the base, probable source of bleeding s/p endoclip placed. 10 mm sigmoid polyp not removed.      Review of Systems:      Review of Systems   Constitutional: Negative.    HENT: Negative.     Eyes: Negative.    Respiratory: Negative.     Cardiovascular: Negative.    Gastrointestinal:  Positive for blood in stool.   Genitourinary: Negative.    Musculoskeletal: Negative.    Skin: Negative.    Neurological: Negative.    Psychiatric/Behavioral: Negative.             Physical Exam:  Vitals/ General Appearance:   Weight/BMI: Body mass index is 29.3 kg/m².    Vitals:    02/07/23 0045 02/07/23 0108 02/07/23 0500 02/07/23 0714   BP:  (!) 150/88 118/72 126/77   Pulse:  83 67 66   Resp:  17 18 16   Temp:  36.7 °C (98.1 °F) 36.6 °C (97.8 °F) 36.4 °C (97.6 °F)   TempSrc:  Temporal Oral Oral   SpO2:  100% 93% 94%   Weight: 87.4 kg (192 lb 10.9 oz) 87.4 kg (192 lb 10.9 oz)     Height:  1.727 m (5' 8\")       Oxygen Therapy:  Pulse Oximetry: 94 " "%, O2 (LPM): 0, O2 Delivery Device: Room air w/o2 available    Physical Exam  Vitals reviewed.   Constitutional:       Appearance: He is obese.   HENT:      Head: Normocephalic and atraumatic.      Mouth/Throat:      Mouth: Mucous membranes are moist.   Eyes:      Extraocular Movements: Extraocular movements intact.      Pupils: Pupils are equal, round, and reactive to light.   Cardiovascular:      Rate and Rhythm: Normal rate and regular rhythm.      Pulses: Normal pulses.      Heart sounds: Normal heart sounds.   Pulmonary:      Effort: Pulmonary effort is normal.      Breath sounds: Normal breath sounds.   Abdominal:      General: Bowel sounds are normal.      Palpations: Abdomen is soft.      Tenderness: There is no abdominal tenderness.   Musculoskeletal:         General: Normal range of motion.      Cervical back: Normal range of motion and neck supple.   Skin:     General: Skin is warm and dry.   Neurological:      General: No focal deficit present.      Mental Status: He is alert and oriented to person, place, and time.   Psychiatric:         Mood and Affect: Mood normal.         Behavior: Behavior normal.         Thought Content: Thought content normal.         Judgment: Judgment normal.       Past Medical History:   Past Medical History:   Diagnosis Date    Cancer (HCC) 10/24/2017    \"lesion on back\"    Hypertension        Past Surgical History:  Past Surgical History:   Procedure Laterality Date    PANENDOSCOPY N/A 12/11/2022    Procedure: EGD, WITH COLONOSCOPY;  Surgeon: Nelson Benjamin M.D.;  Location: SURGERY [facility];  Service: Gastroenterology    VITRECTOMY POSTERIOR Left 2/18/2019    Procedure: VITRECTOMY POSTERIOR-  MEMBRANE STRIPPING, BBG, POSSIBLE AIR FLUID GAS, INTRAUVITREAL TRIESENCE, 23 GAUGE AND ANY OTHR INDICATED PROCEDURES;  Surgeon: Be Keane M.D.;  Location: SURGERY SAME DAY [facility];  Service: Ophthalmology    NODE BIOPSY SENTINEL Bilateral 1/11/2018    Procedure: " NODE BIOPSY SENTINEL - AXILLARY;  Surgeon: Phil Mcmahan M.D.;  Location: SURGERY SAME DAY [facility];  Service: General    WIDE EXCISION Right 10/27/2017    Procedure: WIDE EXCISION- RADICAL MALIGNANT MELANOMA BACK;  Surgeon: Phil Mcmahan M.D.;  Location: SURGERY [facility];  Service: General    NODE DISSECTION Right 10/27/2017    Procedure: EXCISION DYSPLASTIC NEVUS LOWER BACK;  Surgeon: Phil Mcmahan M.D.;  Location: SURGERY [facility];  Service: General    OTHER  2012    right ear lesion removed    HERNIA REPAIR  1998    OTHER ABDOMINAL SURGERY  1996    colostomy take down    OTHER ABDOMINAL SURGERY  1995    large intestine-colostomy       Hospital Medications:    Current Facility-Administered Medications:     valsartan (DIOVAN) tablet 160 mg, 160 mg, Oral, DAILY, Warren Nickerson D.O., 160 mg at 02/07/23 0507    senna-docusate (PERICOLACE or SENOKOT S) 8.6-50 MG per tablet 2 Tablet, 2 Tablet, Oral, BID **AND** polyethylene glycol/lytes (MIRALAX) PACKET 1 Packet, 1 Packet, Oral, QDAY PRN **AND** magnesium hydroxide (MILK OF MAGNESIA) suspension 30 mL, 30 mL, Oral, QDAY PRN **AND** bisacodyl (DULCOLAX) suppository 10 mg, 10 mg, Rectal, QDAY PRN, Warren Nickerson D.O.    NS infusion, , Intravenous, Continuous, Warren Nickerson D.O., Last Rate: 100 mL/hr at 02/07/23 0035, New Bag at 02/07/23 0035    acetaminophen (Tylenol) tablet 650 mg, 650 mg, Oral, Q6HRS PRN, TERRI GoO.    labetalol (NORMODYNE/TRANDATE) injection 10 mg, 10 mg, Intravenous, Q4HRS PRN, TERRI GoO.    ondansetron (ZOFRAN) syringe/vial injection 4 mg, 4 mg, Intravenous, Q4HRS PRN, TERRI GoO.    ondansetron (ZOFRAN ODT) dispertab 4 mg, 4 mg, Oral, Q4HRS PRN, TERRI GoO.    Current Outpatient Medications:  Current Facility-Administered Medications   Medication Dose Route Frequency Provider Last Rate Last Admin    valsartan (DIOVAN) tablet 160 mg  160 mg Oral DAILY Warren Nickerson D.O.   160  mg at 23 0507    senna-docusate (PERICOLACE or SENOKOT S) 8.6-50 MG per tablet 2 Tablet  2 Tablet Oral BID Warren Nickerson D.O.        And    polyethylene glycol/lytes (MIRALAX) PACKET 1 Packet  1 Packet Oral QDAY PRN Warren Nickerson D.O.        And    magnesium hydroxide (MILK OF MAGNESIA) suspension 30 mL  30 mL Oral QDAY PRN Warren Nickerson D.O.        And    bisacodyl (DULCOLAX) suppository 10 mg  10 mg Rectal QDAY PRN Warren Nickerson D.O.        NS infusion   Intravenous Continuous Warren Nickerson D.O. 100 mL/hr at 23 0035 New Bag at 23 0035    acetaminophen (Tylenol) tablet 650 mg  650 mg Oral Q6HRS PRN Warren Nickerson D.O.        labetalol (NORMODYNE/TRANDATE) injection 10 mg  10 mg Intravenous Q4HRS PRN Warren Nickerson D.O.        ondansetron (ZOFRAN) syringe/vial injection 4 mg  4 mg Intravenous Q4HRS PRN Warren Nickerson D.O.        ondansetron (ZOFRAN ODT) dispertab 4 mg  4 mg Oral Q4HRS PRN Warren Nickerson D.O.           Medication Allergy:  Allergies   Allergen Reactions    Morphine Nausea       Family History:  No family history on file.    Social History:  Social History     Socioeconomic History    Marital status:      Spouse name: Not on file    Number of children: Not on file    Years of education: Not on file    Highest education level: Not on file   Occupational History    Not on file   Tobacco Use    Smoking status: Former     Types: Cigarettes     Quit date: 1995     Years since quittin.1    Smokeless tobacco: Never   Substance and Sexual Activity    Alcohol use: Yes     Comment: 2 per week     Drug use: No    Sexual activity: Not on file   Other Topics Concern    Not on file   Social History Narrative    Not on file     Social Determinants of Health     Financial Resource Strain: Not on file   Food Insecurity: Not on file   Transportation Needs: Not on file   Physical Activity: Not on file   Stress: Not on file   Social Connections: Not on file    Intimate Partner Violence: Not on file   Housing Stability: Not on file         MDM (Data Review):     Records reviewed and summarized in current documentation    Lab Data Review:  Recent Results (from the past 24 hour(s))   CBC WITH DIFFERENTIAL    Collection Time: 02/06/23  8:02 PM   Result Value Ref Range    WBC 9.1 4.8 - 10.8 K/uL    RBC 4.22 (L) 4.70 - 6.10 M/uL    Hemoglobin 14.5 14.0 - 18.0 g/dL    Hematocrit 41.3 (L) 42.0 - 52.0 %    MCV 97.9 (H) 81.4 - 97.8 fL    MCH 34.4 (H) 27.0 - 33.0 pg    MCHC 35.1 33.7 - 35.3 g/dL    RDW 43.4 35.9 - 50.0 fL    Platelet Count 270 164 - 446 K/uL    MPV 9.1 9.0 - 12.9 fL    Neutrophils-Polys 74.20 (H) 44.00 - 72.00 %    Lymphocytes 14.00 (L) 22.00 - 41.00 %    Monocytes 7.50 0.00 - 13.40 %    Eosinophils 3.10 0.00 - 6.90 %    Basophils 0.90 0.00 - 1.80 %    Immature Granulocytes 0.30 0.00 - 0.90 %    Nucleated RBC 0.00 /100 WBC    Neutrophils (Absolute) 6.72 1.82 - 7.42 K/uL    Lymphs (Absolute) 1.27 1.00 - 4.80 K/uL    Monos (Absolute) 0.68 0.00 - 0.85 K/uL    Eos (Absolute) 0.28 0.00 - 0.51 K/uL    Baso (Absolute) 0.08 0.00 - 0.12 K/uL    Immature Granulocytes (abs) 0.03 0.00 - 0.11 K/uL    NRBC (Absolute) 0.00 K/uL   COMP METABOLIC PANEL    Collection Time: 02/06/23  8:02 PM   Result Value Ref Range    Sodium 139 135 - 145 mmol/L    Potassium 3.9 3.6 - 5.5 mmol/L    Chloride 104 96 - 112 mmol/L    Co2 23 20 - 33 mmol/L    Anion Gap 12.0 7.0 - 16.0    Glucose 95 65 - 99 mg/dL    Bun 22 8 - 22 mg/dL    Creatinine 0.58 0.50 - 1.40 mg/dL    Calcium 9.4 8.4 - 10.2 mg/dL    AST(SGOT) 22 12 - 45 U/L    ALT(SGPT) 26 2 - 50 U/L    Alkaline Phosphatase 130 (H) 30 - 99 U/L    Total Bilirubin 0.3 0.1 - 1.5 mg/dL    Albumin 4.4 3.2 - 4.9 g/dL    Total Protein 7.1 6.0 - 8.2 g/dL    Globulin 2.7 1.9 - 3.5 g/dL    A-G Ratio 1.6 g/dL   PROTHROMBIN TIME (INR)    Collection Time: 02/06/23  8:02 PM   Result Value Ref Range    PT 13.2 12.0 - 14.6 sec    INR 1.01 0.87 - 1.13   APTT     Collection Time: 23  8:02 PM   Result Value Ref Range    APTT 29.5 24.7 - 36.0 sec   COD (ADULT)    Collection Time: 23  8:02 PM   Result Value Ref Range    ABO Grouping Only O     Rh Grouping Only POS     Antibody Screen-Cod NEG    CORRECTED CALCIUM    Collection Time: 23  8:02 PM   Result Value Ref Range    Correct Calcium 9.1 8.5 - 10.5 mg/dL   ESTIMATED GFR    Collection Time: 23  8:02 PM   Result Value Ref Range    GFR (CKD-EPI) 105 >60 mL/min/1.73 m 2   EKG (NOW)    Collection Time: 23  8:07 PM   Result Value Ref Range    Report       [facility] Emergency Dept.    Test Date:  2023  Pt Name:    BETTE ARSHAD                Department: [facility]  MRN:        [id number]                      [facility]  Gender:     Male                         Technician: KALEN  :        1950                   Requested By:MANUEL VELASQUEZ  Order #:    [id number]                    Reading MD: MANUEL VELASQUEZ MD    Measurements  Intervals                                Axis  Rate:       70                           P:          -26  DE:         185                          QRS:        13  QRSD:       89                           T:          9  QT:         372  QTc:        402    Interpretive Statements  Sinus rhythm  Abnormal R-wave progression, early transition  Borderline repolarization abnormality  Compared to ECG 2022 16:25:00  No significant changes  Electronically Signed On 2023 21:17:52 PST by MANUEL VELASQUEZ MD     CBC without Differential    Collection Time: 23  1:08 AM   Result Value Ref Range    WBC 8.1 4.8 - 10.8 K/uL    RBC 3.75 (L) 4.70 - 6.10 M/uL    Hemoglobin 12.7 (L) 14.0 - 18.0 g/dL    Hematocrit 37.0 (L) 42.0 - 52.0 %    MCV 98.7 (H) 81.4 - 97.8 fL    MCH 33.9 (H) 27.0 - 33.0 pg    MCHC 34.3 33.7 - 35.3 g/dL    RDW 44.0 35.9 - 50.0 fL    Platelet Count 257 164 - 446 K/uL    MPV 9.4 9.0 - 12.9 fL   Basic Metabolic Panel (BMP)     Collection Time: 02/07/23  1:08 AM   Result Value Ref Range    Sodium 140 135 - 145 mmol/L    Potassium 3.7 3.6 - 5.5 mmol/L    Chloride 106 96 - 112 mmol/L    Co2 22 20 - 33 mmol/L    Glucose 105 (H) 65 - 99 mg/dL    Bun 20 8 - 22 mg/dL    Creatinine 0.59 0.50 - 1.40 mg/dL    Calcium 8.6 8.4 - 10.2 mg/dL    Anion Gap 12.0 7.0 - 16.0   ESTIMATED GFR    Collection Time: 02/07/23  1:08 AM   Result Value Ref Range    GFR (CKD-EPI) 104 >60 mL/min/1.73 m 2       Imaging/Procedures Review:      CTA ABDOMEN PELVIS W & W/O POST PROCESS   Final Result         1.  No visualized aneurysm or dissection.   2.  No intraluminal contrast identified to indicate site of GI bleed.   3.  Large right lower pole renal cyst with peripheral calcification, overall stable since prior study.   4.  Enlarged prostate, workup and evaluation for causes of prostate enlargement recommended as clinically appropriate.   5.  Symmetrically thickened bilateral adrenal glands, consider adrenal hyperplasia.   6.  Fat-containing bilateral inguinal hernias   7.  Diverticulosis   8.  Hepatomegaly           MDM (Assessment and Plan):     Patient Active Problem List    Diagnosis Date Noted    Elevated alkaline phosphatase level 02/07/2023    Dehydration 02/07/2023    Acute GI bleeding 02/06/2023    Primary hypertension 12/09/2022    Syncope 12/09/2022    GI bleeding 12/09/2022    GI bleed 12/09/2022    Hypertensive disorder 11/13/2022    Abnormal clinical finding 05/01/2018    Obstructive uropathy 05/01/2018    Raised prostate specific antigen 05/01/2018    Malignant melanoma of skin of trunk, except scrotum (HCC) 10/27/2017     This is a pleasant 71 yo male with a history of diverticular bleed Dec. 2022, was admitted to the hospital 2/6/23 with 3 episodes of painless hematochezia. VSS. Hgb: 14-->12.7.     ASSESSMENT:  Painless hematochezia  Anemia  HTN    PLAN:  Clear liquids, no reds  Movie Prep start at 1400 and second bottle at 1800  NPO after  midnight  Risks, benefits, and alternatives of colonoscopy were discussed with patient and/or family member(s).  Consenting person(s) were given opportunities to ask questions and discuss other options.  Risks including but not limited to perforation, infection, bleeding, missed lesion(s), possible need for surgery(ies) and/or interventional radiology, possible need for repeat procedure(s) and/or additional testing, hospitalization possibly prolonged, cardiac and/or pulmonary event, aspiration, hypoxia, stroke, medication and/or anesthesia reaction, indefinite diagnosis, discomfort, unsuccessful and/or incomplete procedure, ineffective therapy and/or persistent symptoms, damage to adjacent organs and/or vascular structures, and other adverse events possibly life-threatening.  Interactive discussion was undertaken with Layman's terms. I answered questions in full and to satisfaction.  Consenting person(s) stated understanding and acceptance of these risks, and wished to proceed.  Informed consent was given in clear state of mind.     Trend Hgb and transfuse >7     Thank your for the opportunity to assist in the care of your patient.  Please call for any questions or concerns.    ALEXANDER Villareal.

## 2023-02-07 NOTE — ASSESSMENT & PLAN NOTE
- Maintaining good control.  Continue home valsartan.  Monitor blood pressure trend closely with as needed IV labetalol for significant hypertension.

## 2023-02-07 NOTE — H&P
Hospital Medicine History & Physical Note    Date of Service  2/6/2023    Primary Care Physician  Tony Casanova M.D.    Consultants  GI    Specialist Names: Dr Hernandez    Code Status  Full Code    Chief Complaint  Chief Complaint   Patient presents with    Bloody Stools     X 1 episode, was seen in December for same thing       History of Presenting Illness  Luis Angel Sanchez is a 72 y.o. male who presented 2/6/2023 with GI bleed.  Patient states earlier this evening, he had a bowel movement that was red blood, because of this he presented to the emergency department.  He states he did not note any stool, just blood.  He feels like it was more than a cup but not as severe as his recent GI bleed.  He states at the end of last year, he did have a GI bleed which was much more severe, he underwent colonoscopy and was diagnosed with diverticular bleed that had resolved.  He did note a diverticulum that had adherent clot however, presumed source of bleeding.  An endoscopic clip was placed adjacent to this diverticulum.  Patient was to follow as an outpatient.  Patient states he is not had any bleeding since the colonoscopy.  He denies any anticoagulation.  I did discuss the case including labs with the ER physician.    I discussed the plan of care with patient.    Review of Systems  Review of Systems   Constitutional:  Negative for chills, fever and malaise/fatigue.   HENT:  Negative for congestion.    Respiratory:  Negative for cough, sputum production, shortness of breath and stridor.    Cardiovascular:  Negative for chest pain, palpitations and leg swelling.   Gastrointestinal:  Positive for blood in stool. Negative for abdominal pain, constipation, diarrhea, nausea and vomiting.   Genitourinary:  Negative for dysuria and urgency.   Musculoskeletal:  Negative for falls and myalgias.   Neurological:  Negative for dizziness, tingling, loss of consciousness, weakness and headaches.   Psychiatric/Behavioral:  Negative for  depression and suicidal ideas.    All other systems reviewed and are negative.    Past Medical History   has a past medical history of Cancer (HCC) (10/24/2017) and Hypertension.    Surgical History   has a past surgical history that includes other (2012); wide excision (Right, 10/27/2017); node dissection (Right, 10/27/2017); node biopsy sentinel (Bilateral, 1/11/2018); hernia repair (1998); other abdominal surgery (1995); other abdominal surgery (1996); vitrectomy posterior (Left, 2/18/2019); and panendoscopy (N/A, 12/11/2022).     Family History  family history is not on file.   Family history reviewed with patient. There is no family history that is pertinent to the chief complaint.     Social History   reports that he quit smoking about 28 years ago. His smoking use included cigarettes. He has never used smokeless tobacco. He reports current alcohol use. He reports that he does not use drugs.    Allergies  Allergies   Allergen Reactions    Morphine Nausea       Medications  Prior to Admission Medications   Prescriptions Last Dose Informant Patient Reported? Taking?   Ascorbic Acid (VITAMIN C PO)  Patient Yes No   Sig: Take 1 Tablet by mouth every day.   Multiple Vitamins-Minerals (ZINC PO)  Patient Yes No   Sig: Take 1 Tablet by mouth every day.   NON SPECIFIED  Patient Yes No   Sig: Take 2 Tablets by mouth every day. PROSTAGENIX   VITAMIN E PO  Patient Yes No   Sig: Take 1 Tablet by mouth every day.   valsartan (DIOVAN) 160 MG Tab  Patient Yes No   Sig: Take 160 mg by mouth every day.      Facility-Administered Medications: None       Physical Exam  Temp:  [36.3 °C (97.3 °F)-36.4 °C (97.5 °F)] 36.4 °C (97.5 °F)  Pulse:  [81-92] 81  Resp:  [16-18] 16  BP: (156-172)/() 156/87  SpO2:  [96 %] 96 %  Blood Pressure : (!) 156/87   Temperature: 36.4 °C (97.5 °F)   Pulse: 81   Respiration: 16   Pulse Oximetry: 96 %       Physical Exam  Vitals and nursing note reviewed.   Constitutional:       General: He is not  in acute distress.     Appearance: He is well-developed. He is not toxic-appearing or diaphoretic.   HENT:      Head: Normocephalic and atraumatic.      Right Ear: External ear normal.      Left Ear: External ear normal.      Nose: Nose normal. No congestion or rhinorrhea.      Mouth/Throat:      Mouth: Mucous membranes are dry.      Pharynx: No oropharyngeal exudate.   Eyes:      General:         Right eye: No discharge.         Left eye: No discharge.   Neck:      Trachea: No tracheal deviation.   Cardiovascular:      Rate and Rhythm: Normal rate and regular rhythm.      Heart sounds: No murmur heard.    No friction rub. No gallop.   Pulmonary:      Effort: Pulmonary effort is normal. No respiratory distress.      Breath sounds: Normal breath sounds. No stridor. No wheezing or rales.   Chest:      Chest wall: No tenderness.   Abdominal:      General: Bowel sounds are normal. There is no distension.      Palpations: Abdomen is soft.      Tenderness: There is no abdominal tenderness.   Musculoskeletal:         General: No tenderness. Normal range of motion.      Cervical back: Normal range of motion and neck supple. No edema or erythema.      Right lower leg: No edema.      Left lower leg: No edema.   Lymphadenopathy:      Cervical: No cervical adenopathy.   Skin:     General: Skin is warm and dry.      Findings: No erythema or rash.   Neurological:      General: No focal deficit present.      Mental Status: He is alert and oriented to person, place, and time.      Cranial Nerves: No cranial nerve deficit.   Psychiatric:         Mood and Affect: Mood normal.         Behavior: Behavior normal.         Thought Content: Thought content normal.         Judgment: Judgment normal.       Laboratory:  Recent Labs     02/06/23 2002   WBC 9.1   RBC 4.22*   HEMOGLOBIN 14.5   HEMATOCRIT 41.3*   MCV 97.9*   MCH 34.4*   MCHC 35.1   RDW 43.4   PLATELETCT 270   MPV 9.1     Recent Labs     02/06/23 2002   SODIUM 139   POTASSIUM  3.9   CHLORIDE 104   CO2 23   GLUCOSE 95   BUN 22   CREATININE 0.58   CALCIUM 9.4     Recent Labs     02/06/23 2002   ALTSGPT 26   ASTSGOT 22   ALKPHOSPHAT 130*   TBILIRUBIN 0.3   GLUCOSE 95     Recent Labs     02/06/23 2002   APTT 29.5   INR 1.01     No results for input(s): NTPROBNP in the last 72 hours.      No results for input(s): TROPONINT in the last 72 hours.    Imaging:  CTA ABDOMEN PELVIS W & W/O POST PROCESS   Final Result         1.  No visualized aneurysm or dissection.   2.  No intraluminal contrast identified to indicate site of GI bleed.   3.  Large right lower pole renal cyst with peripheral calcification, overall stable since prior study.   4.  Enlarged prostate, workup and evaluation for causes of prostate enlargement recommended as clinically appropriate.   5.  Symmetrically thickened bilateral adrenal glands, consider adrenal hyperplasia.   6.  Fat-containing bilateral inguinal hernias   7.  Diverticulosis   8.  Hepatomegaly          EKG:  I have personally reviewed the images and compared with prior images.    Assessment/Plan:  Justification for Admission Status  I anticipate this patient is appropriate for observation status at this time because GI bleed, normal hemoglobin    Patient will need a Telemetry bed on MEDICAL service .  The need is secondary to GI bleed, normal hemoglobin.    * Acute GI bleeding- (present on admission)  Assessment & Plan  Patient did have multiple episodes of a small amount of blood, certainly less than previous bleed  Presumed diverticular bleed since it has been intermittent in nature and that was not the source was last time  GI has been consulted by ERP and will follow along, await recommendations  I did discuss potential need for another colonoscopy or surgical intervention with the patient and he understands  For now, trend hemoglobin and monitor patient on telemetry  He is dehydrated and will receive IV fluids, I do anticipate hemoglobin dropping because of  this, it is slightly high because of hemoconcentration    Dehydration- (present on admission)  Assessment & Plan  Start IV fluids  Repeat BMP in the morning    Elevated alkaline phosphatase level- (present on admission)  Assessment & Plan  Mild and asymptomatic  No additional work-up    Primary hypertension- (present on admission)  Assessment & Plan  Continue home valsartan  Start as needed labetalol  Adjust as needed        VTE prophylaxis: SCDs/TEDs

## 2023-02-07 NOTE — ASSESSMENT & PLAN NOTE
- Suspect this is recurrent diverticular bleed.  -GI on board, plan for colonoscopy this afternoon.  -Hemoglobin trended down to 12.7 but has been stable since - likely due to hemodilution from IV fluids as dehydrated/hemoconcentrated on arrival.  Continue to trend hemoglobin every 8 hours, and monitor for recurrent rectal bleeding.  -Close hemodynamic monitoring.

## 2023-02-07 NOTE — ED TRIAGE NOTES
"Chief Complaint   Patient presents with    Bloody Stools     X 1 episode, was seen in December for same thing       BP (!) 172/118   Pulse 92   Temp 36.3 °C (97.3 °F) (Temporal)   Resp 18   Ht 1.727 m (5' 8\")   Wt 89.2 kg (196 lb 10.4 oz)   SpO2 96%   BMI 29.90 kg/m²     "

## 2023-02-08 ENCOUNTER — ANESTHESIA EVENT (OUTPATIENT)
Dept: SURGERY | Facility: MEDICAL CENTER | Age: 73
End: 2023-02-08
Payer: MEDICARE

## 2023-02-08 ENCOUNTER — ANESTHESIA (OUTPATIENT)
Dept: SURGERY | Facility: MEDICAL CENTER | Age: 73
End: 2023-02-08
Payer: MEDICARE

## 2023-02-08 VITALS
BODY MASS INDEX: 28.3 KG/M2 | WEIGHT: 186.73 LBS | RESPIRATION RATE: 18 BRPM | HEART RATE: 69 BPM | TEMPERATURE: 97.7 F | DIASTOLIC BLOOD PRESSURE: 68 MMHG | SYSTOLIC BLOOD PRESSURE: 130 MMHG | HEIGHT: 68 IN | OXYGEN SATURATION: 94 %

## 2023-02-08 LAB
ALBUMIN SERPL BCP-MCNC: 3.4 G/DL (ref 3.2–4.9)
ALBUMIN/GLOB SERPL: 1.4 G/DL
ALP SERPL-CCNC: 111 U/L (ref 30–99)
ALT SERPL-CCNC: 23 U/L (ref 2–50)
ANION GAP SERPL CALC-SCNC: 12 MMOL/L (ref 7–16)
AST SERPL-CCNC: 19 U/L (ref 12–45)
BILIRUB SERPL-MCNC: 0.4 MG/DL (ref 0.1–1.5)
BUN SERPL-MCNC: 14 MG/DL (ref 8–22)
CALCIUM ALBUM COR SERPL-MCNC: 8.8 MG/DL (ref 8.5–10.5)
CALCIUM SERPL-MCNC: 8.3 MG/DL (ref 8.4–10.2)
CHLORIDE SERPL-SCNC: 112 MMOL/L (ref 96–112)
CO2 SERPL-SCNC: 18 MMOL/L (ref 20–33)
CREAT SERPL-MCNC: 0.55 MG/DL (ref 0.5–1.4)
ERYTHROCYTE [DISTWIDTH] IN BLOOD BY AUTOMATED COUNT: 45.6 FL (ref 35.9–50)
GFR SERPLBLD CREATININE-BSD FMLA CKD-EPI: 106 ML/MIN/1.73 M 2
GLOBULIN SER CALC-MCNC: 2.5 G/DL (ref 1.9–3.5)
GLUCOSE SERPL-MCNC: 104 MG/DL (ref 65–99)
HCT VFR BLD AUTO: 37.9 % (ref 42–52)
HGB BLD-MCNC: 11.9 G/DL (ref 14–18)
HGB BLD-MCNC: 12.7 G/DL (ref 14–18)
MCH RBC QN AUTO: 33.7 PG (ref 27–33)
MCHC RBC AUTO-ENTMCNC: 33.5 G/DL (ref 33.7–35.3)
MCV RBC AUTO: 100.5 FL (ref 81.4–97.8)
PATHOLOGY CONSULT NOTE: NORMAL
PLATELET # BLD AUTO: 251 K/UL (ref 164–446)
PMV BLD AUTO: 9.2 FL (ref 9–12.9)
POTASSIUM SERPL-SCNC: 3.8 MMOL/L (ref 3.6–5.5)
PROT SERPL-MCNC: 5.9 G/DL (ref 6–8.2)
RBC # BLD AUTO: 3.77 M/UL (ref 4.7–6.1)
SODIUM SERPL-SCNC: 142 MMOL/L (ref 135–145)
WBC # BLD AUTO: 8.5 K/UL (ref 4.8–10.8)

## 2023-02-08 PROCEDURE — 700111 HCHG RX REV CODE 636 W/ 250 OVERRIDE (IP): Performed by: ANESTHESIOLOGY

## 2023-02-08 PROCEDURE — 99100 ANES PT EXTEME AGE<1 YR&>70: CPT | Performed by: ANESTHESIOLOGY

## 2023-02-08 PROCEDURE — 88305 TISSUE EXAM BY PATHOLOGIST: CPT

## 2023-02-08 PROCEDURE — 85027 COMPLETE CBC AUTOMATED: CPT

## 2023-02-08 PROCEDURE — 110371 HCHG SHELL REV 272: Performed by: INTERNAL MEDICINE

## 2023-02-08 PROCEDURE — 00811 ANES LWR INTST NDSC NOS: CPT | Performed by: ANESTHESIOLOGY

## 2023-02-08 PROCEDURE — 160203 HCHG ENDO MINUTES - 1ST 30 MINS LEVEL 4: Performed by: INTERNAL MEDICINE

## 2023-02-08 PROCEDURE — 99239 HOSP IP/OBS DSCHRG MGMT >30: CPT | Performed by: INTERNAL MEDICINE

## 2023-02-08 PROCEDURE — 700102 HCHG RX REV CODE 250 W/ 637 OVERRIDE(OP): Performed by: INTERNAL MEDICINE

## 2023-02-08 PROCEDURE — 700105 HCHG RX REV CODE 258: Performed by: INTERNAL MEDICINE

## 2023-02-08 PROCEDURE — 160035 HCHG PACU - 1ST 60 MINS PHASE I: Performed by: INTERNAL MEDICINE

## 2023-02-08 PROCEDURE — 700101 HCHG RX REV CODE 250: Performed by: ANESTHESIOLOGY

## 2023-02-08 PROCEDURE — 160009 HCHG ANES TIME/MIN: Performed by: INTERNAL MEDICINE

## 2023-02-08 PROCEDURE — 160048 HCHG OR STATISTICAL LEVEL 1-5: Performed by: INTERNAL MEDICINE

## 2023-02-08 PROCEDURE — 85018 HEMOGLOBIN: CPT

## 2023-02-08 PROCEDURE — 160002 HCHG RECOVERY MINUTES (STAT): Performed by: INTERNAL MEDICINE

## 2023-02-08 PROCEDURE — A9270 NON-COVERED ITEM OR SERVICE: HCPCS | Performed by: INTERNAL MEDICINE

## 2023-02-08 PROCEDURE — 36415 COLL VENOUS BLD VENIPUNCTURE: CPT

## 2023-02-08 PROCEDURE — 80053 COMPREHEN METABOLIC PANEL: CPT

## 2023-02-08 PROCEDURE — G0378 HOSPITAL OBSERVATION PER HR: HCPCS

## 2023-02-08 RX ORDER — OXYCODONE HCL 5 MG/5 ML
5 SOLUTION, ORAL ORAL
Status: DISCONTINUED | OUTPATIENT
Start: 2023-02-08 | End: 2023-02-08 | Stop reason: HOSPADM

## 2023-02-08 RX ORDER — PHENYLEPHRINE HCL IN 0.9% NACL 0.5 MG/5ML
SYRINGE (ML) INTRAVENOUS PRN
Status: DISCONTINUED | OUTPATIENT
Start: 2023-02-08 | End: 2023-02-08 | Stop reason: SURG

## 2023-02-08 RX ORDER — HYDRALAZINE HYDROCHLORIDE 20 MG/ML
5 INJECTION INTRAMUSCULAR; INTRAVENOUS
Status: DISCONTINUED | OUTPATIENT
Start: 2023-02-08 | End: 2023-02-08 | Stop reason: HOSPADM

## 2023-02-08 RX ORDER — HYDROMORPHONE HYDROCHLORIDE 1 MG/ML
0.4 INJECTION, SOLUTION INTRAMUSCULAR; INTRAVENOUS; SUBCUTANEOUS
Status: DISCONTINUED | OUTPATIENT
Start: 2023-02-08 | End: 2023-02-08 | Stop reason: HOSPADM

## 2023-02-08 RX ORDER — HYDROMORPHONE HYDROCHLORIDE 1 MG/ML
0.1 INJECTION, SOLUTION INTRAMUSCULAR; INTRAVENOUS; SUBCUTANEOUS
Status: DISCONTINUED | OUTPATIENT
Start: 2023-02-08 | End: 2023-02-08 | Stop reason: HOSPADM

## 2023-02-08 RX ORDER — METOPROLOL TARTRATE 1 MG/ML
1 INJECTION, SOLUTION INTRAVENOUS
Status: DISCONTINUED | OUTPATIENT
Start: 2023-02-08 | End: 2023-02-08 | Stop reason: HOSPADM

## 2023-02-08 RX ORDER — SODIUM CHLORIDE, SODIUM LACTATE, POTASSIUM CHLORIDE, CALCIUM CHLORIDE 600; 310; 30; 20 MG/100ML; MG/100ML; MG/100ML; MG/100ML
INJECTION, SOLUTION INTRAVENOUS CONTINUOUS
Status: DISCONTINUED | OUTPATIENT
Start: 2023-02-08 | End: 2023-02-08 | Stop reason: HOSPADM

## 2023-02-08 RX ORDER — MIDAZOLAM HYDROCHLORIDE 1 MG/ML
INJECTION INTRAMUSCULAR; INTRAVENOUS PRN
Status: DISCONTINUED | OUTPATIENT
Start: 2023-02-08 | End: 2023-02-08 | Stop reason: SURG

## 2023-02-08 RX ORDER — HALOPERIDOL 5 MG/ML
1 INJECTION INTRAMUSCULAR
Status: DISCONTINUED | OUTPATIENT
Start: 2023-02-08 | End: 2023-02-08 | Stop reason: HOSPADM

## 2023-02-08 RX ORDER — HYDROMORPHONE HYDROCHLORIDE 1 MG/ML
0.2 INJECTION, SOLUTION INTRAMUSCULAR; INTRAVENOUS; SUBCUTANEOUS
Status: DISCONTINUED | OUTPATIENT
Start: 2023-02-08 | End: 2023-02-08 | Stop reason: HOSPADM

## 2023-02-08 RX ORDER — OXYCODONE HCL 5 MG/5 ML
10 SOLUTION, ORAL ORAL
Status: DISCONTINUED | OUTPATIENT
Start: 2023-02-08 | End: 2023-02-08 | Stop reason: HOSPADM

## 2023-02-08 RX ORDER — LIDOCAINE HYDROCHLORIDE 20 MG/ML
INJECTION, SOLUTION EPIDURAL; INFILTRATION; INTRACAUDAL; PERINEURAL PRN
Status: DISCONTINUED | OUTPATIENT
Start: 2023-02-08 | End: 2023-02-08 | Stop reason: SURG

## 2023-02-08 RX ORDER — ONDANSETRON 2 MG/ML
4 INJECTION INTRAMUSCULAR; INTRAVENOUS
Status: DISCONTINUED | OUTPATIENT
Start: 2023-02-08 | End: 2023-02-08 | Stop reason: HOSPADM

## 2023-02-08 RX ORDER — DIPHENHYDRAMINE HYDROCHLORIDE 50 MG/ML
12.5 INJECTION INTRAMUSCULAR; INTRAVENOUS
Status: DISCONTINUED | OUTPATIENT
Start: 2023-02-08 | End: 2023-02-08 | Stop reason: HOSPADM

## 2023-02-08 RX ADMIN — PROPOFOL 25 MG: 10 INJECTION, EMULSION INTRAVENOUS at 15:54

## 2023-02-08 RX ADMIN — PROPOFOL 10 MG: 10 INJECTION, EMULSION INTRAVENOUS at 15:59

## 2023-02-08 RX ADMIN — PROPOFOL 25 MG: 10 INJECTION, EMULSION INTRAVENOUS at 15:45

## 2023-02-08 RX ADMIN — SODIUM CHLORIDE, POTASSIUM CHLORIDE, SODIUM LACTATE AND CALCIUM CHLORIDE: 600; 310; 30; 20 INJECTION, SOLUTION INTRAVENOUS at 14:41

## 2023-02-08 RX ADMIN — MIDAZOLAM HYDROCHLORIDE 2 MG: 1 INJECTION, SOLUTION INTRAMUSCULAR; INTRAVENOUS at 15:39

## 2023-02-08 RX ADMIN — SODIUM CHLORIDE: 9 INJECTION, SOLUTION INTRAVENOUS at 11:18

## 2023-02-08 RX ADMIN — VALSARTAN 160 MG: 80 TABLET ORAL at 04:57

## 2023-02-08 RX ADMIN — LIDOCAINE HYDROCHLORIDE 50 MG: 20 INJECTION, SOLUTION EPIDURAL; INFILTRATION; INTRACAUDAL at 15:40

## 2023-02-08 RX ADMIN — FENTANYL CITRATE 25 MCG: 50 INJECTION, SOLUTION INTRAMUSCULAR; INTRAVENOUS at 15:39

## 2023-02-08 RX ADMIN — PROPOFOL 50 MG: 10 INJECTION, EMULSION INTRAVENOUS at 15:40

## 2023-02-08 RX ADMIN — PROPOFOL 30 MG: 10 INJECTION, EMULSION INTRAVENOUS at 15:49

## 2023-02-08 RX ADMIN — FENTANYL CITRATE 25 MCG: 50 INJECTION, SOLUTION INTRAMUSCULAR; INTRAVENOUS at 15:45

## 2023-02-08 RX ADMIN — Medication 50 MCG: at 16:01

## 2023-02-08 RX ADMIN — Medication 150 MCG: at 15:56

## 2023-02-08 ASSESSMENT — FIBROSIS 4 INDEX: FIB4 SCORE: 1.1

## 2023-02-08 ASSESSMENT — PAIN DESCRIPTION - PAIN TYPE
TYPE: ACUTE PAIN
TYPE: ACUTE PAIN

## 2023-02-08 NOTE — PROGRESS NOTES
Telemetry Shift Summary    Rhythm SR  HR Range 66-90  Ectopy rare PVC, rare PAC, rare bigeminy  Measurements .16/.08/.38      Normal Values  Rhythm SR  HR Range:   Measurements: 0.12-0.20/0.06-0.10/0.30-0.52

## 2023-02-08 NOTE — PROGRESS NOTES
Hospital Medicine Daily Progress Note    Date of Service  2/8/2023    Chief Complaint  Bloody stools    Hospital Course  Luis Angel Sanchez is a 72 y.o. male with hypertension, history of GI bleed due to diverticular bleed late last year for which he underwent colonoscopy with endoscopic clip intervention, admitted 2/6/2023 with bloody stools.  On evaluation, vital signs were stable.  Hemoglobin was stable at 14.5.  Electrolytes and renal function are normal.  CT of the abdomen and pelvis showed no intraluminal contrast identified to indicate site of GI bleed.  GI was consulted.  He was felt to be dehydrated.  He was started on IV fluids.    Interval Problem Update  2/8/2023 - I reviewed the patient's chart today. Uneventful night. VSS. Afebrile. Saturating well on RA.  Hemoglobin dropped to 12.7 but has been stable.  No leukocytosis.  Electrolytes and renal function are normal.  GI schedule him for colonoscopy this afternoon.    > I have personally seen and examined the patient today.  He has no further bleeding.  He is passing gas.  Denies any nausea, vomiting, abdominal pain.  No chest pain or shortness breath.  Patient explains        I have discussed this patient's plan of care and discharge plan at IDT rounds today with Case Management, Nursing, Nursing leadership, and other members of the IDT team.    Consultants/Specialty  GI    Code Status  Full Code    Disposition  Patient is not medically cleared for discharge.   Anticipate discharge to home with close outpatient follow-up.  I have placed the appropriate orders for post-discharge needs.    Review of Systems  ROS     Pertinent positives/negatives as mentioned above.     A complete review of systems was personally done by me. All other systems were negative.       Physical Exam  Temp:  [36.3 °C (97.4 °F)-36.7 °C (98 °F)] 36.6 °C (97.8 °F)  Pulse:  [72-95] 72  Resp:  [17-18] 18  BP: (122-165)/(66-96) 131/79  SpO2:  [91 %-95 %] 95 %    Physical Exam  Vitals  reviewed.   Constitutional:       General: He is not in acute distress.     Appearance: Normal appearance. He is not toxic-appearing or diaphoretic.   HENT:      Head: Normocephalic and atraumatic.      Right Ear: External ear normal.      Left Ear: External ear normal.      Mouth/Throat:      Mouth: Mucous membranes are moist.      Pharynx: No oropharyngeal exudate.   Eyes:      General: No scleral icterus.     Extraocular Movements: Extraocular movements intact.      Conjunctiva/sclera: Conjunctivae normal.      Pupils: Pupils are equal, round, and reactive to light.   Cardiovascular:      Rate and Rhythm: Normal rate and regular rhythm.      Heart sounds: Normal heart sounds. No murmur heard.    No gallop.   Pulmonary:      Effort: Pulmonary effort is normal. No respiratory distress.      Breath sounds: Normal breath sounds. No stridor. No wheezing, rhonchi or rales.   Chest:      Chest wall: No tenderness.   Abdominal:      General: Bowel sounds are normal. There is no distension.      Palpations: Abdomen is soft. There is no mass.      Tenderness: There is no abdominal tenderness. There is no guarding or rebound.   Musculoskeletal:         General: No swelling. Normal range of motion.      Cervical back: Normal range of motion and neck supple.      Right lower leg: No edema.      Left lower leg: No edema.   Lymphadenopathy:      Cervical: No cervical adenopathy.   Skin:     General: Skin is warm and dry.      Coloration: Skin is not jaundiced.      Findings: No rash.   Neurological:      General: No focal deficit present.      Mental Status: He is alert and oriented to person, place, and time.      Cranial Nerves: No cranial nerve deficit.   Psychiatric:         Mood and Affect: Mood normal.         Behavior: Behavior normal.         Thought Content: Thought content normal.         Judgment: Judgment normal.     I have performed the physical examination today 2/8/2023.  In review of yesterday's note, there are  no new changes except as documented above.      Fluids    Intake/Output Summary (Last 24 hours) at 2/8/2023 1212  Last data filed at 2/8/2023 0800  Gross per 24 hour   Intake --   Output 300 ml   Net -300 ml       Laboratory  Recent Labs     02/06/23 2002 02/07/23 0108 02/07/23  0903 02/07/23  1722 02/08/23  0137 02/08/23  1002   WBC 9.1 8.1  --   --  8.5  --    RBC 4.22* 3.75*  --   --  3.77*  --    HEMOGLOBIN 14.5 12.7*   < > 12.8* 12.7* 11.9*   HEMATOCRIT 41.3* 37.0*  --   --  37.9*  --    MCV 97.9* 98.7*  --   --  100.5*  --    MCH 34.4* 33.9*  --   --  33.7*  --    MCHC 35.1 34.3  --   --  33.5*  --    RDW 43.4 44.0  --   --  45.6  --    PLATELETCT 270 257  --   --  251  --    MPV 9.1 9.4  --   --  9.2  --     < > = values in this interval not displayed.     Recent Labs     02/06/23 2002 02/07/23 0108 02/08/23  0137   SODIUM 139 140 142   POTASSIUM 3.9 3.7 3.8   CHLORIDE 104 106 112   CO2 23 22 18*   GLUCOSE 95 105* 104*   BUN 22 20 14   CREATININE 0.58 0.59 0.55   CALCIUM 9.4 8.6 8.3*     Recent Labs     02/06/23 2002   APTT 29.5   INR 1.01               Imaging  CTA ABDOMEN PELVIS W & W/O POST PROCESS   Final Result         1.  No visualized aneurysm or dissection.   2.  No intraluminal contrast identified to indicate site of GI bleed.   3.  Large right lower pole renal cyst with peripheral calcification, overall stable since prior study.   4.  Enlarged prostate, workup and evaluation for causes of prostate enlargement recommended as clinically appropriate.   5.  Symmetrically thickened bilateral adrenal glands, consider adrenal hyperplasia.   6.  Fat-containing bilateral inguinal hernias   7.  Diverticulosis   8.  Hepatomegaly           Assessment/Plan  * Acute GI bleeding- (present on admission)  Assessment & Plan  - Suspect this is recurrent diverticular bleed.  -GI on board, plan for colonoscopy this afternoon.  -Hemoglobin trended down to 12.7 but has been stable since - likely due to hemodilution  from IV fluids as dehydrated/hemoconcentrated on arrival.  Continue to trend hemoglobin every 8 hours, and monitor for recurrent rectal bleeding.  -Close hemodynamic monitoring.    Dehydration- (present on admission)  Assessment & Plan  - Continue IV fluids with NS at 100 cc/h.  -BMP in the morning.    Elevated alkaline phosphatase level- (present on admission)  Assessment & Plan  - Likely from dehydration.  Transaminases and bilirubin are normal.  -Continue IV fluids.  -Repeat CMP tomorrow.    Primary hypertension- (present on admission)  Assessment & Plan  - Maintaining good control.  Continue home valsartan.  Monitor blood pressure trend closely with as needed IV labetalol for significant hypertension.         VTE prophylaxis: SCDs/TEDs and pharmacologic prophylaxis contraindicated due to GI bleed

## 2023-02-08 NOTE — ANESTHESIA PREPROCEDURE EVALUATION
Case: [id number] Date/Time: 02/08/23 1515    Procedure: COLONOSCOPY    Location:  ENDOSCOPIC ULTRASOUND ROOM / SURGERY [facility]    Surgeons: Rajesh Curry M.D.        GI bleed  Relevant Problems   CARDIAC   (positive) Hypertensive disorder   (positive) Primary hypertension       Physical Exam    Airway   Mallampati: II  TM distance: >3 FB  Neck ROM: full       Cardiovascular - normal exam  Rhythm: regular  Rate: normal  (-) murmur     Dental - normal exam           Pulmonary - normal exam  Breath sounds clear to auscultation     Abdominal    Neurological - normal exam                 Anesthesia Plan    ASA 2       Plan - MAC               Induction: intravenous    Postoperative Plan: Postoperative administration of opioids is intended.    Pertinent diagnostic labs and testing reviewed    Informed Consent:    Anesthetic plan and risks discussed with patient.    Use of blood products discussed with: patient whom consented to blood products.

## 2023-02-08 NOTE — PROGRESS NOTES
Telemetry Shift Summary     Rhythm: SR  Rate: 73-92  Measurements: .16/.08/.40  Ectopy (reported by Monitor Tech): r PVC     Normal Values  Rhythm: Sinus  HR:   Measurements: 0.12-0.20/0.06-0.10/0.30-0.52

## 2023-02-08 NOTE — DISCHARGE PLANNING
[facility] TCN chart review completed. Collaborated with discharge planning team., ALYSSA Hidalgo. Noted current discharge considerations are for patient return home, noting no further needs per previous TCN visit, chart review or collaboration with ALYSSA.    TCN met briefly with patient at bedside. Patient in good spirits, noted to be currently awaiting colonoscopy (please see Gastroenterology note 2/7/2023 at 9:03AM) and then possible discharge home today.     TCN will continue to follow and collaborate with discharge planning team as additional post acute needs arise. Thank you.    Completed  Choice obtained: none indicated at this time; patient endorsed he is at his baseline level of function with no mobility concerns  [facility] referral not sent; patient declined stating preference for outpatient follow ups

## 2023-02-08 NOTE — OR NURSING
1413 PT TO PRE OP VIA W/C TO ASSUME CARE. PT DENIES PAIN, NAUSEA.     1452 Patient allergies and NPO status verified, home medication reconciliation completed and belongings secured. Patient verbalizes understanding of pain scale, expected course of stay and plan of care. Surgical site verified with patient. IVF established.

## 2023-02-08 NOTE — CARE PLAN
The patient is Stable - Low risk of patient condition declining or worsening    Shift Goals  Clinical Goals: Bowel prep for colonoscopy  Patient Goals: rest, comfort  Family Goals: stayed updated    Progress made toward(s) clinical / shift goals:  Patient completed bowel prep. Colonoscopy scheduled for 2/8 at 1530. Patient aware of NPO status. No complaints of pain.      Problem: Knowledge Deficit - Standard  Goal: Patient and family/care givers will demonstrate understanding of plan of care, disease process/condition, diagnostic tests and medications  Outcome: Progressing     Problem: Urinary Elimination  Goal: Establish and maintain regular urinary output  Outcome: Progressing     Problem: Bowel Elimination  Goal: Establish and maintain regular bowel function  Outcome: Progressing     Problem: Mobility  Goal: Patient's capacity to carry out activities will improve  Outcome: Progressing       Patient is not progressing towards the following goals:

## 2023-02-08 NOTE — CARE PLAN
The patient is Stable - Low risk of patient condition declining or worsening    Shift Goals  Clinical Goals: trend Hgb/Hct, IV fluids, GI consult, safety  Patient Goals: rest, comfort  Family Goals: stayed updated    Progress made toward(s) clinical / shift goals:  H/H stable, with minimal downtrend.  GI plan to do colonoscopy tomorrow, prep started, patient tolerating.  Vitals stable, afebrile, pt receiving IV fluids as ordered.  Patient ambulates steady on feet independently. Compliant with safety and fall precautions. All questions answered, agreeable with plan of care.     Problem: Knowledge Deficit - Standard  Goal: Patient and family/care givers will demonstrate understanding of plan of care, disease process/condition, diagnostic tests and medications  Outcome: Progressing     Problem: Urinary Elimination  Goal: Establish and maintain regular urinary output  Outcome: Progressing     Problem: Bowel Elimination  Goal: Establish and maintain regular bowel function  Outcome: Progressing     Problem: Gastrointestinal Irritability  Goal: Nausea and vomiting will be absent or improve  Outcome: Progressing  Goal: Diarrhea will be absent or improved  Outcome: Progressing     Problem: Mobility  Goal: Patient's capacity to carry out activities will improve  Outcome: Progressing     Problem: Self Care  Goal: Patient will have the ability to perform ADLs independently or with assistance (bathe, groom, dress, toilet and feed)  Outcome: Progressing       Patient is not progressing towards the following goals: n/a

## 2023-02-09 NOTE — ANESTHESIA POSTPROCEDURE EVALUATION
Patient: Luis Angel Sanchez    Procedure Summary     Date: 02/08/23 Room / Location:  ENDOSCOPIC ULTRASOUND ROOM / SURGERY [facility]    Anesthesia Start: 1538 Anesthesia Stop: 1608    Procedure: COLONOSCOPY Diagnosis:       Colon polyp      (Colon polyp [[id number]])    Surgeons: Rajesh Curry M.D. Responsible Provider: Luis Angel Wagner M.D.    Anesthesia Type: MAC ASA Status: 2          Final Anesthesia Type: MAC  Last vitals  BP   Blood Pressure : 130/68    Temp   36.5 °C (97.7 °F)    Pulse   69   Resp   18    SpO2   94 %      Anesthesia Post Evaluation    Patient location during evaluation: PACU  Patient participation: complete - patient participated  Level of consciousness: awake and alert    Airway patency: patent  Anesthetic complications: no  Cardiovascular status: hemodynamically stable  Respiratory status: acceptable  Hydration status: euvolemic    PONV: none          No notable events documented.     Nurse Pain Score: 0 (NPRS)

## 2023-02-09 NOTE — ANESTHESIA TIME REPORT
Anesthesia Start and Stop Event Times     Date Time Event    2/8/2023 1538 Anesthesia Start     1608 Anesthesia Stop        Responsible Staff  02/08/23    Name Role Begin End    Luis Angel Wagner M.D. Anesth 1538 1608        Overtime Reason:  no overtime (within assigned shift)    Comments:

## 2023-02-09 NOTE — PROGRESS NOTES
Patient discharged to home with wife and staff escort. IV discontinued. Prescriptions given to patient, verbalized understanding, consent signed and in chart. Discharge instructions given regarding medication regimen, followup appointments, and GI soft diet.  Education provided, patient asked questions and verbalized understanding. Discharge paperwork signed and in chart. Patient is tolerating diet, stable when ambulating, and pain is well controlled. All belongings collected. No further questions or concerns at this time.

## 2023-02-09 NOTE — PROGRESS NOTES
Telemetry Shift Summary    Rhythm SR  HR Range 64-73  Ectopy rare/occasional PVC, rare PAC  Measurements 0.20/.08/.38    Normal Values  Rhythm SR  HR Range:   Measurements: 0.12-0.20/0.06-0.10/0.30-0.52

## 2023-02-09 NOTE — PROCEDURES
DATE OF PROCEDURE:  02/08/2023     PROCEDURE:  Colonoscopy with snare polypectomy and cold biopsy.     ENDOSCOPIST:  Rajesh Curry MD     INDICATIONS:  GI bleeding.     MEDICATIONS:  The patient received monitored anesthesia care by Dr. Luis Angel Sharif, please see anesthesia flow sheets for details.     DESCRIPTION OF PROCEDURE:  The patient was informed in detail of the   indications as well as the risks, benefits and alternatives of the procedure   and he indicates understanding and elected to proceed.  Consent is signed and   placed on the chart.  After the patient was deemed adequately sedated, a   standard Olympus variable stiffness adult colonoscope was lubricated and   gently placed in the anus and from here carefully maneuvered throughout the   colon into the cecum.  Cecal landmarks observed include the ileocecal valve   and appendiceal orifice.  Quality of preparation was fair, adequate.  The   scope was then slowly and carefully withdrawn looking mucosa in a   circumferential methodic manner throughout.  It was noted throughout the colon   that he had pandiverticulosis of moderate severity.  There was no evidence of   blood within the colon, stool observed was all brown.  In the splenic   flexure, a small, approximately 5 mm polyp was noted, sessile in shape and   removed via cold snare polypectomy technique.  In the descending colon, a   small 3 mm polyp was observed and removed via cold biopsy technique.  In the   sigmoid colon, there was a larger 1.2 cm polyp that was removed via cold snare   polypectomy technique.  Retroflexion was performed in the rectum and it was   observed that he had enlarged internal hemorrhoids and probably some prolapse   changes.  The scope was then straightened.  Excess air suctioned and scope   withdrawn.  The procedure terminated.     FINDINGS:  1.  Pandiverticulosis.  2.  Splenic flexure polyp.  3.  Descending colon polyp.  4.  Sigmoid colon polyp.  5.  Internal  hemorrhoids.     COMPLICATIONS:  None.     TISSUE/BIOPSIES:  1.  Splenic flexure polyp.  2.  Descending colon polyp.  3.  Sigmoid colon polyp.     THERAPY:  None.     IMPRESSION/PLAN/MEDICAL DECISION MAKING::  1.  Diverticular hemorrhage -- I do suspect that the cause of the bleeding   described was again most likely from diverticular bleeding.  At this time,   could not see any possible guilty diverticula, so no therapy was applied.  In   fact, all the stool that was observed was brown in color with no old blood or   fresh bleeding noted.  2.  Personal history of colon polyps.  See description above.  3.  Hematochezia.  4.  Anemia.     At this point, we will sign off.  I do suspect diverticular hemorrhage here.    I will advance his diet and he can be discharged from a GI point of view, I   will arrange followup with us as well as the pathology results letter to be   sent him.        ______________________________  MD JP SMITH/KIRILL/NARESH    DD:  02/08/2023 16:14  DT:  02/08/2023 16:31    Job#:  [id number]

## 2023-02-09 NOTE — DISCHARGE SUMMARY
Discharge Summary    CHIEF COMPLAINT ON ADMISSION  Chief Complaint   Patient presents with    Bloody Stools     X 1 episode, was seen in December for same thing       Reason for Admission  Bloody Stools     Admission Date  2/6/2023    CODE STATUS  Full Code    HPI & HOSPITAL COURSE  Luis Angel Sanchez is a 72 y.o. male with hypertension, history of GI bleed due to diverticular bleed late last year for which he underwent colonoscopy with endoscopic clip intervention, admitted 2/6/2023 with bloody stools.  On evaluation, vital signs were stable.  Hemoglobin was stable at 14.5.  Electrolytes and renal function are normal.  CT of the abdomen and pelvis showed no intraluminal contrast identified to indicate site of GI bleed.  He was felt to be dehydrated.  He was started on IV fluids.  GI was consulted, and patient underwent colonoscopy with findings of pan diverticulosis but did not find any active bleeding or guilty diverticula so no therapy was applied.  His hemoglobin was monitored, and although it dropped to 12.7 it remained stable since then.  He remained hemodynamically stable and afebrile.  His electrolytes and renal function remain normal.  GI has cleared him for discharge with outpatient follow-up in the office.    I have personally seen and examined the patient on the day of discharge. With his clinical improvement, he was deemed ready to discharge from the hospital as he did not have any further hospitalization needs. Patient felt comfortable going home. The discharge plan was discussed with the patient, with which he was agreeable to.     Therefore, he is discharged in good and stable condition to home with close outpatient follow-up.    The patient met 2-midnight criteria for an inpatient stay at the time of discharge.    Discharge Date  2/8/2023      FOLLOW UP ITEMS POST DISCHARGE  -Follow-up with outpatient GI.  -Avoid NSAIDs and anticoagulants.  - counseled to seek immediate medical attention, or return to  the ED for recurrent or worsening symptoms.      DISCHARGE DIAGNOSES  Principal Problem:    Acute GI bleeding POA: Yes  Active Problems:    Elevated alkaline phosphatase level POA: Yes    Dehydration POA: Yes    Primary hypertension POA: Yes  Resolved Problems:    * No resolved hospital problems. *      FOLLOW UP  No future appointments.  No follow-up provider specified.    MEDICATIONS ON DISCHARGE     Medication List        CONTINUE taking these medications        Instructions   NON SPECIFIED   Take 2 Tablets by mouth every day. PROSTAGENIX  Dose: 2 Tablet     valsartan 160 MG Tabs  Commonly known as: DIOVAN   Take 160 mg by mouth every day.  Dose: 160 mg     VITAMIN C PO   Take 1 Tablet by mouth every day.  Dose: 1 Tablet     VITAMIN E PO   Take 1 Tablet by mouth every day.  Dose: 1 Tablet     ZINC PO   Take 1 Tablet by mouth every day.  Dose: 1 Tablet              Allergies  Allergies   Allergen Reactions    Morphine Nausea       DIET  Orders Placed This Encounter   Procedures    Diet Order Diet: Regular     Standing Status:   Standing     Number of Occurrences:   1     Order Specific Question:   Diet:     Answer:   Regular [1]       ACTIVITY  As tolerated.  Weight bearing as tolerated    CONSULTATIONS  GI    PROCEDURES  As above    LABORATORY  Lab Results   Component Value Date    SODIUM 142 02/08/2023    POTASSIUM 3.8 02/08/2023    CHLORIDE 112 02/08/2023    CO2 18 (L) 02/08/2023    GLUCOSE 104 (H) 02/08/2023    BUN 14 02/08/2023    CREATININE 0.55 02/08/2023        Lab Results   Component Value Date    WBC 8.5 02/08/2023    HEMOGLOBIN 11.9 (L) 02/08/2023    HEMATOCRIT 37.9 (L) 02/08/2023    PLATELETCT 251 02/08/2023        Total time of the discharge process = 45 minutes.

## 2023-02-09 NOTE — OR NURSING
1605: pt to PACU from Endo via [facility]. Report received from anesthesia and RN. Breathing spontaneous and nolabored on 6 L O2 via mask. Rouses to voice and answers questions appropriately. Denies pain or nausea. SBP in 80's, anesthesia aware- no need to treat at this time    1620: removed from O2, tolerating room air w/ sat's > 92%. Continues to deny pain or nausea. SBP improved to 120's. Tolerating clears    1635: meets criteria for transfer to floor    1640: report called to Myriam HEATH on MED-TELE    1650: discharged to room via [facility] by transport stable on room air on telemetry

## 2023-02-09 NOTE — OR SURGEON
Immediate Post OP Note    PreOp Diagnosis: GI bleeding      PostOp Diagnosis: Pan diverticulosis, splenic flexure polyp - CS, Descending colon polyp - CB, sigmoid polyp - sessile, 1.3cm - CS, Int  hemorrhoids      Procedure(s):  COLONOSCOPY - Wound Class: Clean Contaminated    Surgeon(s):  Rajesh Curry M.D.    Anesthesiologist/Type of Anesthesia:  Anesthesiologist: Luis Angel Wagner M.D./MAC    Surgical Staff:  Circulator: Tony Samaniego R.N.  Endoscopy Technician: Min Mcbride; Rayne Bejarano    Specimens removed if any:  ID Type Source Tests Collected by Time Destination   A : splenic flexure polyp bx Tissue Colon PATHOLOGY SPECIMEN Rajesh Curry M.D. 2/8/2023  3:48 PM    B : polyp bx Tissue Colon - Descending PATHOLOGY SPECIMEN Rajesh Curry M.D. 2/8/2023  3:52 PM    C : polyp bx Tissue Colon - Sigmoid PATHOLOGY SPECIMEN Rajesh Curry M.D. 2/8/2023  3:57 PM        Estimated Blood Loss: Minimal    Findings: 1) Pan diverticulosis, I suspect that the bleeding came from one of these diverticuli but could not tell which one. No bleeding or blood in colon.  2) Splenic flexure polyp, 3) desc colon polyp - sigmoid polyp (larger), 4) Int hemorrhoids    Complications: None    Will sign off. I will send him a path letter and arrange for routine outpatient follow up        2/8/2023 4:03 PM Rajesh Curry M.D.

## 2023-02-09 NOTE — CARE PLAN
The patient is Stable - Low risk of patient condition declining or worsening    Shift Goals  Clinical Goals: colonoscopy at 1530, NPO, monitor Hgb/Hct, safety  Patient Goals: rest, comfort  Family Goals: stayed updated    Progress made toward(s) clinical / shift goals:  Colonoscopy completed, H/H stable throughout day.  Patient steady on feet, compliant with fall and safety precautions. Family updated, and looking forward to patient discharge.       Problem: Knowledge Deficit - Standard  Goal: Patient and family/care givers will demonstrate understanding of plan of care, disease process/condition, diagnostic tests and medications  Outcome: Progressing     Problem: Urinary Elimination  Goal: Establish and maintain regular urinary output  Outcome: Progressing     Problem: Bowel Elimination  Goal: Establish and maintain regular bowel function  Outcome: Progressing     Problem: Gastrointestinal Irritability  Goal: Nausea and vomiting will be absent or improve  Outcome: Progressing  Goal: Diarrhea will be absent or improved  Outcome: Progressing     Problem: Mobility  Goal: Patient's capacity to carry out activities will improve  Outcome: Progressing     Problem: Self Care  Goal: Patient will have the ability to perform ADLs independently or with assistance (bathe, groom, dress, toilet and feed)  Outcome: Progressing       Patient is not progressing towards the following goals: n/a

## 2023-02-09 NOTE — DISCHARGE INSTRUCTIONS
GI will contact you for follow-up appointment.   Avoid NSAID (such as alleve, advil, motrin, ibuprofen etc). You can take tylenol as needed for pain.

## 2023-02-27 ENCOUNTER — HOSPITAL ENCOUNTER (OUTPATIENT)
Dept: RADIOLOGY | Facility: MEDICAL CENTER | Age: 73
End: 2023-02-27
Attending: FAMILY MEDICINE
Payer: MEDICARE

## 2023-02-27 DIAGNOSIS — R05.1 ACUTE COUGH: ICD-10-CM

## 2023-02-27 PROCEDURE — 71046 X-RAY EXAM CHEST 2 VIEWS: CPT

## 2023-08-16 ENCOUNTER — HOSPITAL ENCOUNTER (EMERGENCY)
Facility: MEDICAL CENTER | Age: 73
End: 2023-08-16
Attending: EMERGENCY MEDICINE
Payer: MEDICARE

## 2023-08-16 ENCOUNTER — APPOINTMENT (OUTPATIENT)
Dept: RADIOLOGY | Facility: MEDICAL CENTER | Age: 73
End: 2023-08-16
Attending: EMERGENCY MEDICINE
Payer: MEDICARE

## 2023-08-16 VITALS
SYSTOLIC BLOOD PRESSURE: 103 MMHG | WEIGHT: 190.7 LBS | DIASTOLIC BLOOD PRESSURE: 72 MMHG | TEMPERATURE: 97.7 F | HEIGHT: 65 IN | RESPIRATION RATE: 17 BRPM | HEART RATE: 79 BPM | BODY MASS INDEX: 31.77 KG/M2 | OXYGEN SATURATION: 98 %

## 2023-08-16 DIAGNOSIS — K62.5 RECTAL BLEEDING: ICD-10-CM

## 2023-08-16 LAB
ABO GROUP BLD: NORMAL
ALBUMIN SERPL BCP-MCNC: 4 G/DL (ref 3.2–4.9)
ALBUMIN/GLOB SERPL: 1.5 G/DL
ALP SERPL-CCNC: 99 U/L (ref 30–99)
ALT SERPL-CCNC: 19 U/L (ref 2–50)
ANION GAP SERPL CALC-SCNC: 14 MMOL/L (ref 7–16)
APTT PPP: 27.8 SEC (ref 24.7–36)
AST SERPL-CCNC: 22 U/L (ref 12–45)
BASOPHILS # BLD AUTO: 0.8 % (ref 0–1.8)
BASOPHILS # BLD: 0.08 K/UL (ref 0–0.12)
BILIRUB SERPL-MCNC: 0.4 MG/DL (ref 0.1–1.5)
BLD GP AB SCN SERPL QL: NORMAL
BUN SERPL-MCNC: 25 MG/DL (ref 8–22)
CALCIUM ALBUM COR SERPL-MCNC: 8.9 MG/DL (ref 8.5–10.5)
CALCIUM SERPL-MCNC: 8.9 MG/DL (ref 8.4–10.2)
CHLORIDE SERPL-SCNC: 100 MMOL/L (ref 96–112)
CO2 SERPL-SCNC: 23 MMOL/L (ref 20–33)
CREAT SERPL-MCNC: 1.05 MG/DL (ref 0.5–1.4)
EOSINOPHIL # BLD AUTO: 0.11 K/UL (ref 0–0.51)
EOSINOPHIL NFR BLD: 1 % (ref 0–6.9)
ERYTHROCYTE [DISTWIDTH] IN BLOOD BY AUTOMATED COUNT: 44.7 FL (ref 35.9–50)
GFR SERPLBLD CREATININE-BSD FMLA CKD-EPI: 76 ML/MIN/1.73 M 2
GLOBULIN SER CALC-MCNC: 2.6 G/DL (ref 1.9–3.5)
GLUCOSE SERPL-MCNC: 108 MG/DL (ref 65–99)
HCT VFR BLD AUTO: 32.9 % (ref 42–52)
HGB BLD-MCNC: 11.3 G/DL (ref 14–18)
IMM GRANULOCYTES # BLD AUTO: 0.04 K/UL (ref 0–0.11)
IMM GRANULOCYTES NFR BLD AUTO: 0.4 % (ref 0–0.9)
INR PPP: 0.95 (ref 0.87–1.13)
LIPASE SERPL-CCNC: 32 U/L (ref 11–82)
LYMPHOCYTES # BLD AUTO: 1.84 K/UL (ref 1–4.8)
LYMPHOCYTES NFR BLD: 17.6 % (ref 22–41)
MCH RBC QN AUTO: 34 PG (ref 27–33)
MCHC RBC AUTO-ENTMCNC: 34.3 G/DL (ref 32.3–36.5)
MCV RBC AUTO: 99.1 FL (ref 81.4–97.8)
MONOCYTES # BLD AUTO: 0.76 K/UL (ref 0–0.85)
MONOCYTES NFR BLD AUTO: 7.3 % (ref 0–13.4)
NEUTROPHILS # BLD AUTO: 7.65 K/UL (ref 1.82–7.42)
NEUTROPHILS NFR BLD: 72.9 % (ref 44–72)
NRBC # BLD AUTO: 0 K/UL
NRBC BLD-RTO: 0 /100 WBC (ref 0–0.2)
PLATELET # BLD AUTO: 298 K/UL (ref 164–446)
PMV BLD AUTO: 9.4 FL (ref 9–12.9)
POTASSIUM SERPL-SCNC: 3.2 MMOL/L (ref 3.6–5.5)
PROT SERPL-MCNC: 6.6 G/DL (ref 6–8.2)
PROTHROMBIN TIME: 13.2 SEC (ref 12–14.6)
RBC # BLD AUTO: 3.32 M/UL (ref 4.7–6.1)
RH BLD: NORMAL
SODIUM SERPL-SCNC: 137 MMOL/L (ref 135–145)
WBC # BLD AUTO: 10.5 K/UL (ref 4.8–10.8)

## 2023-08-16 PROCEDURE — 85730 THROMBOPLASTIN TIME PARTIAL: CPT

## 2023-08-16 PROCEDURE — 85025 COMPLETE CBC W/AUTO DIFF WBC: CPT

## 2023-08-16 PROCEDURE — 36415 COLL VENOUS BLD VENIPUNCTURE: CPT

## 2023-08-16 PROCEDURE — 71045 X-RAY EXAM CHEST 1 VIEW: CPT

## 2023-08-16 PROCEDURE — 86901 BLOOD TYPING SEROLOGIC RH(D): CPT

## 2023-08-16 PROCEDURE — 700117 HCHG RX CONTRAST REV CODE 255: Performed by: EMERGENCY MEDICINE

## 2023-08-16 PROCEDURE — 86900 BLOOD TYPING SEROLOGIC ABO: CPT

## 2023-08-16 PROCEDURE — 86850 RBC ANTIBODY SCREEN: CPT

## 2023-08-16 PROCEDURE — 74177 CT ABD & PELVIS W/CONTRAST: CPT

## 2023-08-16 PROCEDURE — 83690 ASSAY OF LIPASE: CPT

## 2023-08-16 PROCEDURE — 85610 PROTHROMBIN TIME: CPT

## 2023-08-16 PROCEDURE — 80053 COMPREHEN METABOLIC PANEL: CPT

## 2023-08-16 PROCEDURE — 99284 EMERGENCY DEPT VISIT MOD MDM: CPT

## 2023-08-16 RX ADMIN — IOHEXOL 100 ML: 350 INJECTION, SOLUTION INTRAVENOUS at 22:05

## 2023-08-16 ASSESSMENT — FIBROSIS 4 INDEX: FIB4 SCORE: 1.1

## 2023-08-17 NOTE — ED TRIAGE NOTES
"72 yr old male to triage  Chief Complaint   Patient presents with    Rectal Bleeding    Lightheadedness    Dizziness     Patient report of rectal bleeding with lightheadedness and dizziness since 5:30 pm.  Denies abdominal pain and bloated sensation.  No nausea or vomiting.      BP (!) 141/91   Pulse 99   Temp 36.4 °C (97.5 °F) (Temporal)   Resp 18   Ht 1.651 m (5' 5\")   Wt 86.5 kg (190 lb 11.2 oz)   SpO2 95%   BMI 31.73 kg/m²     "

## 2023-08-17 NOTE — ED NOTES
Patient given discharge instructions.  Questions and concerns addressed.  Patient ambulated out with wife

## 2023-08-17 NOTE — ED PROVIDER NOTES
"                                                        ED Provider Note    CHIEF COMPLAINT  Chief Complaint   Patient presents with    Rectal Bleeding    Lightheadedness    Dizziness     Patient report of rectal bleeding with lightheadedness and dizziness since 5:30 pm.  Denies abdominal pain and bloated sensation.  No nausea or vomiting.         HPI    Primary care provider: Tony Casanova M.D.   History obtained from: Patient and wife  History limited by: None     Luis Angel Sanchez is a 72 y.o. male who presents to the ED with wife complaining of 1 episode of bright red blood with bowel movement around 530 this evening.  He reports that the bowel movement otherwise was normal.  No straining or diarrhea.  Patient states that this is his third episode of rectal bleeding and he has had colonoscopy with his 2 prior episodes.  He was found to have diverticulosis but otherwise no acute findings.  He is not on blood thinners and does not take NSAIDs.  He reports feeling a little lightheaded and slight bloating of abdomen but denies any pain.  No loss of consciousness.  He denies fever/shortness of breath or difficulty breathing/nausea/vomiting/dysuria/hematuria/rash.    REVIEW OF SYSTEMS  Please see HPI for pertinent positives/negatives.  All other systems reviewed and are negative.     PAST MEDICAL HISTORY  Past Medical History:   Diagnosis Date    Cancer (HCC) 10/24/2017    \"lesion on back\"    Hypertension         SURGICAL HISTORY  Past Surgical History:   Procedure Laterality Date    MA COLONOSCOPY,DIAGNOSTIC N/A 2/8/2023    Procedure: COLONOSCOPY;  Surgeon: Rajesh Curry M.D.;  Location: [facility];  Service: Gastroenterology    PANENDOSCOPY N/A 12/11/2022    Procedure: EGD, WITH COLONOSCOPY;  Surgeon: Nelson Benjamin M.D.;  Location: [facility];  Service: Gastroenterology    VITRECTOMY POSTERIOR Left 2/18/2019    Procedure: VITRECTOMY POSTERIOR-  MEMBRANE STRIPPING, BBG, POSSIBLE AIR FLUID " "GAS, INTRAUVITREAL TRIESENCE, 23 GAUGE AND ANY OTHR INDICATED PROCEDURES;  Surgeon: Be Keane M.D.;  Location: SURGERY SAME DAY [facility];  Service: Ophthalmology    NODE BIOPSY SENTINEL Bilateral 2018    Procedure: NODE BIOPSY SENTINEL - AXILLARY;  Surgeon: Phil Mcmahan M.D.;  Location: SURGERY SAME DAY [facility];  Service: General    WIDE EXCISION Right 10/27/2017    Procedure: WIDE EXCISION- RADICAL MALIGNANT MELANOMA BACK;  Surgeon: Phil Mcmahan M.D.;  Location: SURGERY [facility];  Service: General    NODE DISSECTION Right 10/27/2017    Procedure: EXCISION DYSPLASTIC NEVUS LOWER BACK;  Surgeon: Phil Mcmahan M.D.;  Location: SURGERY [facility];  Service: General    OTHER      right ear lesion removed    HERNIA REPAIR      OTHER ABDOMINAL SURGERY      colostomy take down    OTHER ABDOMINAL SURGERY      large intestine-colostomy        SOCIAL HISTORY  Social History     Tobacco Use    Smoking status: Former     Types: Cigarettes     Quit date: 1995     Years since quittin.6    Smokeless tobacco: Never   Vaping Use    Vaping Use: Never used   Substance and Sexual Activity    Alcohol use: Yes     Comment: 2 per week     Drug use: No    Sexual activity: Not on file        FAMILY HISTORY  History reviewed. No pertinent family history.     CURRENT MEDICATIONS  Home Medications       Reviewed by Shasha Keenan R.N. (Registered Nurse) on 23 at 3894  Med List Status: Partial     Medication Last Dose Status   NON SPECIFIED 2023 Active                     ALLERGIES  Allergies   Allergen Reactions    Morphine Nausea        PHYSICAL EXAM  VITAL SIGNS: /72   Pulse 79   Temp 36.5 °C (97.7 °F) (Temporal)   Resp 17   Ht 1.651 m (5' 5\")   Wt 86.5 kg (190 lb 11.2 oz)   SpO2 98%   BMI 31.73 kg/m²  @BENNIE[[id number]::@     Pulse ox interpretation: 95% I interpret this pulse ox as normal     Constitutional: Well developed, well nourished, alert in no " apparent distress, nontoxic appearance    HENT: No external signs of trauma, normocephalic  Eyes: PERRL, conjunctiva without erythema, no discharge, no icterus    Neck: Soft and supple, trachea midline, no stridor, no tenderness, no LAD, good ROM    Cardiovascular: Regular rate and rhythm, no murmurs/rubs/gallops, strong distal pulses and good perfusion    Thorax & Lungs: No respiratory distress, CTAB    Abdomen: Soft, nontender, nondistended, no guarding, no rebound, normal BS    Back: No CVAT     Extremities: No cyanosis, no edema, no gross deformity, good ROM, intact distal pulses with brisk cap refill    Skin: Warm, dry, no pallor/cyanosis, no rash noted      Neuro: A/O times 3, no focal deficits noted    Psychiatric: Cooperative, normal mood and affect, normal judgement, appropriate for clinical situation        DIAGNOSTIC STUDIES / PROCEDURES        LABS  All labs reviewed by me.     Results for orders placed or performed during the hospital encounter of 08/16/23   COD (ADULT)   Result Value Ref Range    ABO Grouping Only O     Rh Grouping Only POS     Antibody Screen-Cod NEG    CBC WITH DIFFERENTIAL   Result Value Ref Range    WBC 10.5 4.8 - 10.8 K/uL    RBC 3.32 (L) 4.70 - 6.10 M/uL    Hemoglobin 11.3 (L) 14.0 - 18.0 g/dL    Hematocrit 32.9 (L) 42.0 - 52.0 %    MCV 99.1 (H) 81.4 - 97.8 fL    MCH 34.0 (H) 27.0 - 33.0 pg    MCHC 34.3 32.3 - 36.5 g/dL    RDW 44.7 35.9 - 50.0 fL    Platelet Count 298 164 - 446 K/uL    MPV 9.4 9.0 - 12.9 fL    Neutrophils-Polys 72.90 (H) 44.00 - 72.00 %    Lymphocytes 17.60 (L) 22.00 - 41.00 %    Monocytes 7.30 0.00 - 13.40 %    Eosinophils 1.00 0.00 - 6.90 %    Basophils 0.80 0.00 - 1.80 %    Immature Granulocytes 0.40 0.00 - 0.90 %    Nucleated RBC 0.00 0.00 - 0.20 /100 WBC    Neutrophils (Absolute) 7.65 (H) 1.82 - 7.42 K/uL    Lymphs (Absolute) 1.84 1.00 - 4.80 K/uL    Monos (Absolute) 0.76 0.00 - 0.85 K/uL    Eos (Absolute) 0.11 0.00 - 0.51 K/uL    Baso (Absolute) 0.08 0.00 -  0.12 K/uL    Immature Granulocytes (abs) 0.04 0.00 - 0.11 K/uL    NRBC (Absolute) 0.00 K/uL   COMP METABOLIC PANEL   Result Value Ref Range    Sodium 137 135 - 145 mmol/L    Potassium 3.2 (L) 3.6 - 5.5 mmol/L    Chloride 100 96 - 112 mmol/L    Co2 23 20 - 33 mmol/L    Anion Gap 14.0 7.0 - 16.0    Glucose 108 (H) 65 - 99 mg/dL    Bun 25 (H) 8 - 22 mg/dL    Creatinine 1.05 0.50 - 1.40 mg/dL    Calcium 8.9 8.4 - 10.2 mg/dL    Correct Calcium 8.9 8.5 - 10.5 mg/dL    AST(SGOT) 22 12 - 45 U/L    ALT(SGPT) 19 2 - 50 U/L    Alkaline Phosphatase 99 30 - 99 U/L    Total Bilirubin 0.4 0.1 - 1.5 mg/dL    Albumin 4.0 3.2 - 4.9 g/dL    Total Protein 6.6 6.0 - 8.2 g/dL    Globulin 2.6 1.9 - 3.5 g/dL    A-G Ratio 1.5 g/dL   LIPASE   Result Value Ref Range    Lipase 32 11 - 82 U/L   PROTHROMBIN TIME   Result Value Ref Range    PT 13.2 12.0 - 14.6 sec    INR 0.95 0.87 - 1.13   APTT   Result Value Ref Range    APTT 27.8 24.7 - 36.0 sec   ESTIMATED GFR   Result Value Ref Range    GFR (CKD-EPI) 76 >60 mL/min/1.73 m 2        RADIOLOGY  I have independently interpreted the diagnostic imaging associated with this visit and am waiting the final reading from the radiologist.   My preliminary interpretation is as follows: No perforation/obstruction.    CT-ABDOMEN-PELVIS WITH   Final Result         1.  Fat-containing bilateral inguinal hernias.   2.  Enlarged prostate, workup and evaluation for causes of prostate enlargement recommended as clinically appropriate.   3.  Diverticulosis   4.  Hepatomegaly   5.  Atherosclerosis and atherosclerotic coronary artery disease      DX-CHEST-PORTABLE (1 VIEW)   Final Result         1.  No acute cardiopulmonary disease.   2.  Atherosclerosis             COURSE & MEDICAL DECISION MAKING  Nursing notes, VS, PMSFHx reviewed in chart.     Review of past medical records shows the patient was last admitted to this hospital February 6, 2023 for rectal bleeding and discharged on February 8, 2023.  He also had  previous episode due to diverticular bleed for which he underwent colonoscopy with endoscopic clipping.      Differential diagnoses considered include but are not limited to: Upper/lower GI bleed, hemorrhoids, diverticulitis, polyps, cancer, anemia       ED Observation Status? Yes; I am placing the patient in to an observation status due to a diagnostic uncertainty as well as therapeutic intensity. Patient placed in observation status at 8:59 PM, 8/16/2023.     Observation plan is as follows: We will obtain laboratory and imaging studies and monitor patient in the ED.    Upon Reevaluation, the patient's condition has: Remained stable and will be discharged.    Patient discharged from ED Observation status at 2239 on August 16, 2023.       INITIAL ASSESSMENT AND PLAN  Care Narrative: This is a 72-year-old male patient with past medical history of hypertension and skin cancer and prior episodes of rectal bleeding who presents to the ED with episode of rectal bleeding tonight.  Initial exam is benign.  Will obtain laboratory and imaging studies and closely monitor patient in the ED.      Discussion of management with other Q or appropriate source(s): None     Escalation of care considered, and ultimately not performed: acute inpatient care management, however at this time, the patient is most appropriate for outpatient management.     Decision tools and prescription drugs considered including, but not limited to: Medication modification   .        History and physical exam as above.  Laboratory testing shows mild anemia and mild hypokalemia but otherwise fairly unremarkable.  CT abdomen/pelvis and chest x-ray with findings as above.  I discussed the findings with patient and wife.  Patient reports no further episodes of bowel movements or rectal bleeding.  He continues to deny any pain.  He has remained clinically stable during his ED stay and without evidence for acute abdomen.  He is not on blood thinners and has  no risk factors for severe hemorrhage.  He does not have severe anemia or indications for emergent transfusion at this time.  He declined admission which I think is reasonable.  Patient will follow-up with his GI.  I also discussed with him the incidental findings on his CT scan and outpatient follow-up.  I discussed with patient and wife worrisome signs and symptoms and return to ED precautions.  They verbalized understanding and agreed with plan of care with no further questions or concerns.      The patient is referred to a primary physician for blood pressure management, diabetic screening, and for all other preventative health concerns.       FINAL IMPRESSION  1. Rectal bleeding Acute          DISPOSITION  Patient will be discharged home in stable condition.       FOLLOW UP  Tony Casanova M.D.  [address]  [address]  [place] [address]  [phone]    Call in 1 day      Please follow-up with your GI doctor    Call in 1 day      [facility], Emergency Dept  [address]  [place] [id number] [phone]    If symptoms worsen         OUTPATIENT MEDICATIONS  Discharge Medication List as of 8/16/2023 11:03 PM             Electronically signed by: Otf Teague D.O., 8/16/2023 8:57 PM      Portions of this record were made with voice recognition software.  Despite my review, errors may remain.  Please interpret this chart in the appropriate context.

## 2023-08-24 ENCOUNTER — TELEPHONE (OUTPATIENT)
Dept: HEALTH INFORMATION MANAGEMENT | Facility: OTHER | Age: 73
End: 2023-08-24

## 2024-04-02 ENCOUNTER — HOSPITAL ENCOUNTER (OUTPATIENT)
Dept: LAB | Facility: MEDICAL CENTER | Age: 74
End: 2024-04-02
Attending: FAMILY MEDICINE
Payer: MEDICARE

## 2024-04-02 LAB
ALBUMIN SERPL BCP-MCNC: 4.4 G/DL (ref 3.2–4.9)
ALBUMIN/GLOB SERPL: 1.5 G/DL
ALP SERPL-CCNC: 114 U/L (ref 30–99)
ALT SERPL-CCNC: 25 U/L (ref 2–50)
ANION GAP SERPL CALC-SCNC: 15 MMOL/L (ref 7–16)
APPEARANCE UR: ABNORMAL
AST SERPL-CCNC: 21 U/L (ref 12–45)
BACTERIA #/AREA URNS HPF: NEGATIVE /HPF
BASOPHILS # BLD AUTO: 0.8 % (ref 0–1.8)
BASOPHILS # BLD: 0.06 K/UL (ref 0–0.12)
BILIRUB SERPL-MCNC: 0.4 MG/DL (ref 0.1–1.5)
BILIRUB UR QL STRIP.AUTO: NEGATIVE
BUN SERPL-MCNC: 24 MG/DL (ref 8–22)
CALCIUM ALBUM COR SERPL-MCNC: 9.2 MG/DL (ref 8.5–10.5)
CALCIUM SERPL-MCNC: 9.5 MG/DL (ref 8.5–10.5)
CHLORIDE SERPL-SCNC: 103 MMOL/L (ref 96–112)
CHOLEST SERPL-MCNC: 179 MG/DL (ref 100–199)
CK SERPL-CCNC: 136 U/L (ref 0–154)
CO2 SERPL-SCNC: 23 MMOL/L (ref 20–33)
COLOR UR: YELLOW
CREAT SERPL-MCNC: 0.8 MG/DL (ref 0.5–1.4)
CRP SERPL HS-MCNC: <0.3 MG/DL (ref 0–0.75)
EOSINOPHIL # BLD AUTO: 0.27 K/UL (ref 0–0.51)
EOSINOPHIL NFR BLD: 3.7 % (ref 0–6.9)
EPI CELLS #/AREA URNS HPF: NEGATIVE /HPF
ERYTHROCYTE [DISTWIDTH] IN BLOOD BY AUTOMATED COUNT: 45.9 FL (ref 35.9–50)
EST. AVERAGE GLUCOSE BLD GHB EST-MCNC: 120 MG/DL
FASTING STATUS PATIENT QL REPORTED: NORMAL
GFR SERPLBLD CREATININE-BSD FMLA CKD-EPI: 94 ML/MIN/1.73 M 2
GLOBULIN SER CALC-MCNC: 3 G/DL (ref 1.9–3.5)
GLUCOSE SERPL-MCNC: 116 MG/DL (ref 65–99)
GLUCOSE UR STRIP.AUTO-MCNC: NEGATIVE MG/DL
HBA1C MFR BLD: 5.8 % (ref 4–5.6)
HCT VFR BLD AUTO: 43.3 % (ref 42–52)
HDLC SERPL-MCNC: 49 MG/DL
HGB BLD-MCNC: 15.4 G/DL (ref 14–18)
HYALINE CASTS #/AREA URNS LPF: ABNORMAL /LPF
IMM GRANULOCYTES # BLD AUTO: 0.03 K/UL (ref 0–0.11)
IMM GRANULOCYTES NFR BLD AUTO: 0.4 % (ref 0–0.9)
KETONES UR STRIP.AUTO-MCNC: NEGATIVE MG/DL
LDLC SERPL CALC-MCNC: 107 MG/DL
LEUKOCYTE ESTERASE UR QL STRIP.AUTO: ABNORMAL
LYMPHOCYTES # BLD AUTO: 1.34 K/UL (ref 1–4.8)
LYMPHOCYTES NFR BLD: 18.5 % (ref 22–41)
MCH RBC QN AUTO: 34.5 PG (ref 27–33)
MCHC RBC AUTO-ENTMCNC: 35.6 G/DL (ref 32.3–36.5)
MCV RBC AUTO: 97.1 FL (ref 81.4–97.8)
MICRO URNS: ABNORMAL
MONOCYTES # BLD AUTO: 0.56 K/UL (ref 0–0.85)
MONOCYTES NFR BLD AUTO: 7.7 % (ref 0–13.4)
NEUTROPHILS # BLD AUTO: 5 K/UL (ref 1.82–7.42)
NEUTROPHILS NFR BLD: 68.9 % (ref 44–72)
NITRITE UR QL STRIP.AUTO: NEGATIVE
NRBC # BLD AUTO: 0 K/UL
NRBC BLD-RTO: 0 /100 WBC (ref 0–0.2)
PH UR STRIP.AUTO: 5.5 [PH] (ref 5–8)
PLATELET # BLD AUTO: 264 K/UL (ref 164–446)
PMV BLD AUTO: 9.2 FL (ref 9–12.9)
POTASSIUM SERPL-SCNC: 3.9 MMOL/L (ref 3.6–5.5)
PROT SERPL-MCNC: 7.4 G/DL (ref 6–8.2)
PROT UR QL STRIP: 30 MG/DL
PSA SERPL-MCNC: 14.6 NG/ML (ref 0–4)
RBC # BLD AUTO: 4.46 M/UL (ref 4.7–6.1)
RBC # URNS HPF: ABNORMAL /HPF
RBC UR QL AUTO: ABNORMAL
SODIUM SERPL-SCNC: 141 MMOL/L (ref 135–145)
SP GR UR STRIP.AUTO: 1.02
TRIGL SERPL-MCNC: 114 MG/DL (ref 0–149)
TSH SERPL DL<=0.005 MIU/L-ACNC: 1.65 UIU/ML (ref 0.38–5.33)
UROBILINOGEN UR STRIP.AUTO-MCNC: 0.2 MG/DL
WBC # BLD AUTO: 7.3 K/UL (ref 4.8–10.8)
WBC #/AREA URNS HPF: ABNORMAL /HPF

## 2024-04-02 PROCEDURE — 80053 COMPREHEN METABOLIC PANEL: CPT

## 2024-04-02 PROCEDURE — 83036 HEMOGLOBIN GLYCOSYLATED A1C: CPT

## 2024-04-02 PROCEDURE — 81001 URINALYSIS AUTO W/SCOPE: CPT

## 2024-04-02 PROCEDURE — 85025 COMPLETE CBC W/AUTO DIFF WBC: CPT

## 2024-04-02 PROCEDURE — 80061 LIPID PANEL: CPT

## 2024-04-02 PROCEDURE — 86140 C-REACTIVE PROTEIN: CPT

## 2024-04-02 PROCEDURE — 84443 ASSAY THYROID STIM HORMONE: CPT

## 2024-04-02 PROCEDURE — 36415 COLL VENOUS BLD VENIPUNCTURE: CPT

## 2024-04-02 PROCEDURE — 84153 ASSAY OF PSA TOTAL: CPT

## 2024-04-02 PROCEDURE — 82550 ASSAY OF CK (CPK): CPT

## 2024-04-15 ENCOUNTER — HOSPITAL ENCOUNTER (OUTPATIENT)
Dept: RADIOLOGY | Facility: MEDICAL CENTER | Age: 74
End: 2024-04-15
Attending: FAMILY MEDICINE
Payer: MEDICARE

## 2024-04-15 DIAGNOSIS — M25.551 RIGHT HIP PAIN: ICD-10-CM

## 2024-04-17 ENCOUNTER — HOSPITAL ENCOUNTER (OUTPATIENT)
Dept: RADIOLOGY | Facility: MEDICAL CENTER | Age: 74
End: 2024-04-17
Attending: FAMILY MEDICINE
Payer: MEDICARE

## 2024-04-17 DIAGNOSIS — M25.552 LEFT HIP PAIN: ICD-10-CM

## 2024-04-17 PROCEDURE — 73502 X-RAY EXAM HIP UNI 2-3 VIEWS: CPT | Mod: LT

## 2024-09-04 ENCOUNTER — PATIENT MESSAGE (OUTPATIENT)
Dept: HEALTH INFORMATION MANAGEMENT | Facility: OTHER | Age: 74
End: 2024-09-04

## 2024-11-14 ENCOUNTER — TELEPHONE (OUTPATIENT)
Dept: HEALTH INFORMATION MANAGEMENT | Facility: OTHER | Age: 74
End: 2024-11-14

## (undated) DEVICE — JELLY SURGILUBE STERILE FOIL 5 GM (150EA/BX)

## (undated) DEVICE — NEPTUNE 4 PORT MANIFOLD - (20/PK)

## (undated) DEVICE — GOWN WARMING STANDARD FLEX - (30/CA)

## (undated) DEVICE — NEEDLE FILTER ASPIRATION 18 GA X 1 1/2 IN (100EA/BX)

## (undated) DEVICE — GOWN SURGEONS LARGE - (32/CA)

## (undated) DEVICE — KIT ANESTHESIA W/CIRCUIT & 3/LT BAG W/FILTER (20EA/CA)

## (undated) DEVICE — CANNULA SUB-TENONS ANESTH. 1.1X25MM 19GAX1IN (10EA/SP)

## (undated) DEVICE — CATHETER IV 20 GA X 1-1/4 ---SURG.& SDS ONLY--- (50EA/BX)

## (undated) DEVICE — ELECTRODE 850 FOAM ADHESIVE - HYDROGEL RADIOTRNSPRNT (50/PK)

## (undated) DEVICE — GLOVE, LITE (PAIR)

## (undated) DEVICE — SUCTION INSTRUMENT YANKAUER BULBOUS TIP W/O VENT (50EA/CA)

## (undated) DEVICE — TUBING CLEARLINK DUO-VENT - C-FLO (48EA/CA)

## (undated) DEVICE — MASK ANESTHESIA ADULT  - (100/CA)

## (undated) DEVICE — CANISTER SUCTION 3000ML MECHANICAL FILTER AUTO SHUTOFF MEDI-VAC NONSTERILE LF DISP  (40EA/CA)

## (undated) DEVICE — SPONGE GAUZE NON-STERILE 4X4 - (2000/CA 10PK/CA)

## (undated) DEVICE — TOWEL STOP TIMEOUT SAFETY FLAG (40EA/CA)

## (undated) DEVICE — KIT  I.V. START (100EA/CA)

## (undated) DEVICE — SUTURE 3-0 VICRYL PLUS SH - 8X 18 INCH (12/BX)

## (undated) DEVICE — FORCEP RADIAL JAW 4 STANDARD CAPACITY W/NEEDLE 240CM (40EA/BX)

## (undated) DEVICE — MASK, LARYNGEAL AIRWAY #4

## (undated) DEVICE — GLOVE BIOGEL SZ 7 SURGICAL PF LTX - (50PR/BX 4BX/CA)

## (undated) DEVICE — CANISTER SUCTION RIGID RED 1500CC (40EA/CA)

## (undated) DEVICE — CATHETER IV SAFETY 20 GA X 1-1/4 (50/BX)

## (undated) DEVICE — CONTAINER SPECIMEN BAG OR - STERILE 4 OZ W/LID (100EA/CA)

## (undated) DEVICE — CANNULA O2 COMFORT SOFT EAR ADULT 7 FT TUBING (50/CA)

## (undated) DEVICE — SPONGE GAUZESTER. 2X2 4-PL - (2/PK 50PK/BX 30BX/CS)

## (undated) DEVICE — TEETHGUARD ENT -2BX MIN ORDER- (6EA/BX)

## (undated) DEVICE — DRAPE IOBAN II INCISE 23X17 - (10EA/BX 4BX/CA)

## (undated) DEVICE — SYRINGE SAFETY 10 ML 18 GA X 1 1/2 BLUNT LL (100/BX 4BX/CA)

## (undated) DEVICE — LIGASURE SM JAW SEALER CRVD - (6EA/CA)

## (undated) DEVICE — HEAD HOLDER JUNIOR/ADULT

## (undated) DEVICE — SPONGE GAUZESTER 4 X 4 4PLY - (128PK/CA)

## (undated) DEVICE — STERI STRIP COMPOUND BENZOIN - TINCTURE 0.6ML WITH APPLICATOR (40EA/BX)

## (undated) DEVICE — SHEET TRANSVERSE LAP - (12EA/CA)

## (undated) DEVICE — GLOVE BIOGEL INDICATOR SZ 7.5 SURGICAL PF LTX - (50PR/BX 4BX/CA)

## (undated) DEVICE — PROTECTOR ULNA NERVE - (36PR/CA)

## (undated) DEVICE — SODIUM CHL IRRIGATION 0.9% 1000ML (12EA/CA)

## (undated) DEVICE — GLOVE BIOGEL SZ 6.5 SURGICAL PF LTX (50PR/BX 4BX/CA)

## (undated) DEVICE — SUTURE GENERAL

## (undated) DEVICE — TRAP POLYP E-TRAP (25EA/BX)

## (undated) DEVICE — BOVIE BLADE COATED &INSULATED - 25/PK

## (undated) DEVICE — SUTURE 4-0 MONOCRYL PLUS PS-2 - 27 INCH (36/BX)

## (undated) DEVICE — LACTATED RINGERS INJ 1000 ML - (14EA/CA 60CA/PF)

## (undated) DEVICE — CAPTIVATOR II-15MM ROUND STIFF  (40/BX)

## (undated) DEVICE — SUTURE 7-0 VICRYL TG140-8 (12PK/BX)

## (undated) DEVICE — SET EXTENSION WITH 2 PORTS (48EA/CA) ***PART #2C8610 IS A SUBSTITUTE*****

## (undated) DEVICE — DEVICE HEMOSTATIC CLIPPING RESOLUTION 360 DEGREES (20EA/BX)

## (undated) DEVICE — WATER IRRIGATION STERILE 1000ML (12EA/CA)

## (undated) DEVICE — CAPTIVATOR II-10MM ROUND STIFF  (40/BX)

## (undated) DEVICE — COVER PROBE STERILE CONE (12EA/CA)

## (undated) DEVICE — SENSOR SPO2 NEO LNCS ADHESIVE (20/BX) SEE USER NOTES

## (undated) DEVICE — SENSOR OXIMETER ADULT SPO2 RD SET (20EA/BX)

## (undated) DEVICE — CHLORAPREP 26 ML APPLICATOR - ORANGE TINT(25/CA)

## (undated) DEVICE — GLOVE SZ 6.5 BIOGEL PI MICRO - PF LF (50PR/BX)

## (undated) DEVICE — SLEEVE, VASO, THIGH, MED

## (undated) DEVICE — STAPLER SKIN DISP - (6/BX 10BX/CA) VISISTAT

## (undated) DEVICE — TUBE CONNECTING SUCTION - CLEAR PLASTIC STERILE 72 IN (50EA/CA)

## (undated) DEVICE — ELECTRODE DUAL RETURN W/ CORD - (50/PK)

## (undated) DEVICE — KIT CUSTOM PROCEDURE SINGLE FOR ENDO  (15/CA)

## (undated) DEVICE — SUTURE EYE

## (undated) DEVICE — NEEDLE NON SAFETY 25 GA X 1 1/2 IN HYPO (100EA/BX)

## (undated) DEVICE — SET LEADWIRE 5 LEAD BEDSIDE DISPOSABLE ECG (1SET OF 5/EA)

## (undated) DEVICE — SYRINGE 10 ML CONTROL LL (25EA/BX 4BX/CA)

## (undated) DEVICE — LENS GRIESHABER MACULAR

## (undated) DEVICE — SUTURE 3-0 ETHILON FS-1 - (36/BX) 30 INCH

## (undated) DEVICE — DRAPE LARGE 3 QUARTER - (20/CA)

## (undated) DEVICE — BANDAGE ELASTIC 6 IN X 5 YDS - LATEX FREE (10/BX)

## (undated) DEVICE — PACK MINOR BASIN - (2EA/CA)

## (undated) DEVICE — GLOVE BIOGEL SURGICAL PF LATEX M SIZE 8.5 (50PR/BX 4BX/CA)

## (undated) DEVICE — PACK MAJOR BASIN - (2EA/CA)

## (undated) DEVICE — SYRINGE SAFETY 5 ML 18 GA X 1-1/2 BLUNT LL (100/BX 4BX/CA)

## (undated) DEVICE — APPLIER OPEN MEDIUM CLIP (6EA/BX)

## (undated) DEVICE — KIT ROOM DECONTAMINATION

## (undated) DEVICE — CLOSURE SKIN STRIP 1/2 X 4 IN - (STERI STRIP) (50/BX 4BX/CA)

## (undated) DEVICE — GOWN SURGEONS X-LARGE - DISP. (30/CA)

## (undated) DEVICE — PAD PREP 24 X 48 CUFFED - (100/CA)

## (undated) DEVICE — BLADE SURGICAL #15 - (50/BX 3BX/CA)

## (undated) DEVICE — DRESSING TRANSPARENT FILM TEGADERM 2.375 X 2.75"  (100EA/BX)"

## (undated) DEVICE — SPONGE GAUZE NON-STERILE 2X2 - 4-PLY (200/PK 40PK/CA)

## (undated) DEVICE — TOWELS CLOTH SURGICAL - (4/PK 20PK/CA)

## (undated) DEVICE — DRESSING TRANSPARENT FILM TEGADERM 4 X 4.75" (50EA/BX)"

## (undated) DEVICE — GLOVE BIOGEL INDICATOR SZ 6.5 SURGICAL PF LTX - (50PR/BX 4BX/CA)

## (undated) DEVICE — SYRINGE SAFETY 3 ML 18 GA X 1 1/2 BLUNT LL (100/BX 8BX/CA)